# Patient Record
Sex: FEMALE | Race: OTHER | Employment: OTHER | URBAN - METROPOLITAN AREA
[De-identification: names, ages, dates, MRNs, and addresses within clinical notes are randomized per-mention and may not be internally consistent; named-entity substitution may affect disease eponyms.]

---

## 2018-04-23 ENCOUNTER — HOSPITAL ENCOUNTER (OUTPATIENT)
Dept: GENERAL RADIOLOGY | Age: 83
Discharge: HOME | End: 2018-04-25
Payer: MEDICARE

## 2018-04-23 ENCOUNTER — HOSPITAL ENCOUNTER (OUTPATIENT)
Dept: LAB | Age: 83
Discharge: HOME | End: 2018-04-23
Payer: MEDICARE

## 2018-04-23 DIAGNOSIS — R52 PAIN: ICD-10-CM

## 2018-04-23 LAB
ALBUMIN SERPL-MCNC: 4.5 G/DL (ref 3.9–4.9)
ALP BLD-CCNC: 84 U/L (ref 40–130)
ALT SERPL-CCNC: 14 U/L (ref 0–33)
AST SERPL-CCNC: 14 U/L (ref 0–35)
BASOPHILS ABSOLUTE: 0 K/UL (ref 0–0.2)
BASOPHILS RELATIVE PERCENT: 0.5 %
BILIRUB SERPL-MCNC: 0.4 MG/DL (ref 0–1.2)
BILIRUBIN DIRECT: 0.2 MG/DL (ref 0–0.3)
BILIRUBIN, INDIRECT: 0.2 MG/DL (ref 0–0.6)
CHOLESTEROL, TOTAL: 154 MG/DL (ref 0–199)
EOSINOPHILS ABSOLUTE: 0.1 K/UL (ref 0–0.7)
EOSINOPHILS RELATIVE PERCENT: 0.9 %
HCT VFR BLD CALC: 39.6 % (ref 37–47)
HDLC SERPL-MCNC: 51 MG/DL (ref 40–59)
HEMOGLOBIN: 13.3 G/DL (ref 12–16)
LDL CHOLESTEROL CALCULATED: 71 MG/DL (ref 0–129)
LYMPHOCYTES ABSOLUTE: 1.8 K/UL (ref 1–4.8)
LYMPHOCYTES RELATIVE PERCENT: 27.4 %
MCH RBC QN AUTO: 30.8 PG (ref 27–31.3)
MCHC RBC AUTO-ENTMCNC: 33.6 % (ref 33–37)
MCV RBC AUTO: 91.9 FL (ref 82–100)
MONOCYTES ABSOLUTE: 0.5 K/UL (ref 0.2–0.8)
MONOCYTES RELATIVE PERCENT: 7 %
NEUTROPHILS ABSOLUTE: 4.2 K/UL (ref 1.4–6.5)
NEUTROPHILS RELATIVE PERCENT: 64.2 %
PDW BLD-RTO: 13.7 % (ref 11.5–14.5)
PLATELET # BLD: 280 K/UL (ref 130–400)
RBC # BLD: 4.31 M/UL (ref 4.2–5.4)
SEDIMENTATION RATE, ERYTHROCYTE: 26 MM (ref 0–30)
TOTAL PROTEIN: 7.5 G/DL (ref 6.4–8.1)
TRIGL SERPL-MCNC: 161 MG/DL (ref 0–200)
WBC # BLD: 6.5 K/UL (ref 4.8–10.8)

## 2018-04-23 PROCEDURE — 80061 LIPID PANEL: CPT

## 2018-04-23 PROCEDURE — 85652 RBC SED RATE AUTOMATED: CPT

## 2018-04-23 PROCEDURE — 85025 COMPLETE CBC W/AUTO DIFF WBC: CPT

## 2018-04-23 PROCEDURE — 80076 HEPATIC FUNCTION PANEL: CPT

## 2018-04-23 PROCEDURE — 74018 RADEX ABDOMEN 1 VIEW: CPT

## 2018-04-25 ENCOUNTER — HOSPITAL ENCOUNTER (OUTPATIENT)
Dept: ULTRASOUND IMAGING | Age: 83
Discharge: HOME | End: 2018-04-27
Payer: MEDICARE

## 2018-04-25 DIAGNOSIS — R10.9 ABDOMINAL PAIN, UNSPECIFIED ABDOMINAL LOCATION: ICD-10-CM

## 2018-04-25 PROCEDURE — 76700 US EXAM ABDOM COMPLETE: CPT

## 2018-09-27 ENCOUNTER — HOSPITAL ENCOUNTER (OUTPATIENT)
Dept: LAB | Age: 83
Discharge: HOME | End: 2018-09-27
Payer: MEDICARE

## 2018-09-27 PROCEDURE — 84443 ASSAY THYROID STIM HORMONE: CPT

## 2018-09-27 PROCEDURE — 82306 VITAMIN D 25 HYDROXY: CPT

## 2018-09-27 PROCEDURE — 85025 COMPLETE CBC W/AUTO DIFF WBC: CPT

## 2018-09-27 PROCEDURE — 80053 COMPREHEN METABOLIC PANEL: CPT

## 2019-02-21 ENCOUNTER — HOSPITAL ENCOUNTER (EMERGENCY)
Age: 84
Discharge: HOME | End: 2019-02-22
Attending: EMERGENCY MEDICINE
Payer: MEDICARE

## 2019-02-21 VITALS
HEIGHT: 62 IN | WEIGHT: 162 LBS | OXYGEN SATURATION: 96 % | TEMPERATURE: 97.8 F | DIASTOLIC BLOOD PRESSURE: 64 MMHG | BODY MASS INDEX: 29.81 KG/M2 | SYSTOLIC BLOOD PRESSURE: 162 MMHG | HEART RATE: 72 BPM | RESPIRATION RATE: 18 BRPM

## 2019-02-21 DIAGNOSIS — I10 ESSENTIAL HYPERTENSION: Primary | ICD-10-CM

## 2019-02-21 PROCEDURE — 99283 EMERGENCY DEPT VISIT LOW MDM: CPT

## 2019-02-21 RX ORDER — MELOXICAM 7.5 MG/1
7.5 TABLET ORAL PRN
COMMUNITY
End: 2020-07-16 | Stop reason: ALTCHOICE

## 2019-02-21 RX ORDER — VALSARTAN 160 MG/1
320 TABLET ORAL DAILY
COMMUNITY
Start: 2002-09-23 | End: 2020-07-16 | Stop reason: DRUGHIGH

## 2019-02-21 RX ORDER — DIAZEPAM 2 MG/1
5 TABLET ORAL 2 TIMES DAILY
COMMUNITY
Start: 2001-05-14 | End: 2021-12-13

## 2019-02-21 RX ORDER — ATENOLOL 50 MG/1
50 TABLET ORAL DAILY
COMMUNITY
Start: 2001-05-14 | End: 2020-07-16 | Stop reason: DRUGHIGH

## 2019-02-21 ASSESSMENT — PAIN DESCRIPTION - PAIN TYPE: TYPE: ACUTE PAIN

## 2019-02-21 ASSESSMENT — PAIN DESCRIPTION - LOCATION: LOCATION: HEAD

## 2019-02-21 ASSESSMENT — PAIN SCALES - GENERAL
PAINLEVEL_OUTOF10: 0
PAINLEVEL_OUTOF10: 0

## 2019-02-21 ASSESSMENT — PAIN DESCRIPTION - ORIENTATION: ORIENTATION: RIGHT

## 2019-02-21 ASSESSMENT — PAIN DESCRIPTION - DESCRIPTORS: DESCRIPTORS: DULL

## 2019-02-21 ASSESSMENT — PAIN DESCRIPTION - FREQUENCY: FREQUENCY: INTERMITTENT

## 2019-02-22 RX ORDER — ACETAMINOPHEN 500 MG
1000 TABLET ORAL ONCE
Status: DISCONTINUED | OUTPATIENT
Start: 2019-02-22 | End: 2019-02-22 | Stop reason: HOSPADM

## 2019-03-07 ENCOUNTER — HOSPITAL ENCOUNTER (OUTPATIENT)
Dept: LAB | Age: 84
Discharge: HOME | End: 2019-03-07
Payer: MEDICARE

## 2019-03-08 ENCOUNTER — HOSPITAL ENCOUNTER (OUTPATIENT)
Dept: LAB | Age: 84
Discharge: HOME | End: 2019-03-08
Payer: MEDICARE

## 2019-03-08 LAB
ALBUMIN SERPL-MCNC: 4.6 G/DL (ref 3.5–4.6)
ALP BLD-CCNC: 76 U/L (ref 40–130)
ALT SERPL-CCNC: 26 U/L (ref 0–33)
ANION GAP SERPL CALCULATED.3IONS-SCNC: 14 MEQ/L (ref 9–15)
AST SERPL-CCNC: 22 U/L (ref 0–35)
BASOPHILS ABSOLUTE: 0 K/UL (ref 0–0.2)
BASOPHILS RELATIVE PERCENT: 0.6 %
BILIRUB SERPL-MCNC: 0.4 MG/DL (ref 0.2–0.7)
BUN BLDV-MCNC: 18 MG/DL (ref 8–23)
C-REACTIVE PROTEIN: 2 MG/L (ref 0–5)
CALCIUM SERPL-MCNC: 9.4 MG/DL (ref 8.5–9.9)
CHLORIDE BLD-SCNC: 100 MEQ/L (ref 95–107)
CHOLESTEROL, TOTAL: 156 MG/DL (ref 0–199)
CO2: 27 MEQ/L (ref 20–31)
CREAT SERPL-MCNC: 0.64 MG/DL (ref 0.5–0.9)
EOSINOPHILS ABSOLUTE: 0.1 K/UL (ref 0–0.7)
EOSINOPHILS RELATIVE PERCENT: 1 %
GFR AFRICAN AMERICAN: >60
GFR NON-AFRICAN AMERICAN: >60
GLOBULIN: 3.2 G/DL (ref 2.3–3.5)
GLUCOSE BLD-MCNC: 84 MG/DL (ref 70–99)
HCT VFR BLD CALC: 39.9 % (ref 37–47)
HDLC SERPL-MCNC: 51 MG/DL (ref 40–59)
HEMOGLOBIN: 13.2 G/DL (ref 12–16)
IRON SATURATION: 30 % (ref 11–46)
IRON: 94 UG/DL (ref 37–145)
LDL CHOLESTEROL CALCULATED: 75 MG/DL (ref 0–129)
LYMPHOCYTES ABSOLUTE: 1.8 K/UL (ref 1–4.8)
LYMPHOCYTES RELATIVE PERCENT: 25.7 %
MCH RBC QN AUTO: 29.6 PG (ref 27–31.3)
MCHC RBC AUTO-ENTMCNC: 33.1 % (ref 33–37)
MCV RBC AUTO: 89.6 FL (ref 82–100)
MONOCYTES ABSOLUTE: 0.7 K/UL (ref 0.2–0.8)
MONOCYTES RELATIVE PERCENT: 10.1 %
NEUTROPHILS ABSOLUTE: 4.4 K/UL (ref 1.4–6.5)
NEUTROPHILS RELATIVE PERCENT: 62.6 %
PDW BLD-RTO: 14 % (ref 11.5–14.5)
PLATELET # BLD: 285 K/UL (ref 130–400)
POTASSIUM SERPL-SCNC: 3.9 MEQ/L (ref 3.4–4.9)
RBC # BLD: 4.45 M/UL (ref 4.2–5.4)
SODIUM BLD-SCNC: 141 MEQ/L (ref 135–144)
TOTAL IRON BINDING CAPACITY: 315 UG/DL (ref 178–450)
TOTAL PROTEIN: 7.8 G/DL (ref 6.3–8)
TRIGL SERPL-MCNC: 149 MG/DL (ref 0–150)
TSH SERPL DL<=0.05 MIU/L-ACNC: 4.03 UIU/ML (ref 0.44–3.86)
VITAMIN D 25-HYDROXY: 37 NG/ML (ref 30–100)
WBC # BLD: 7 K/UL (ref 4.8–10.8)

## 2019-03-08 PROCEDURE — 86140 C-REACTIVE PROTEIN: CPT

## 2019-03-08 PROCEDURE — 82306 VITAMIN D 25 HYDROXY: CPT

## 2019-03-08 PROCEDURE — 80061 LIPID PANEL: CPT

## 2019-03-08 PROCEDURE — 84443 ASSAY THYROID STIM HORMONE: CPT

## 2019-03-08 PROCEDURE — 83550 IRON BINDING TEST: CPT

## 2019-03-08 PROCEDURE — 85025 COMPLETE CBC W/AUTO DIFF WBC: CPT

## 2019-03-08 PROCEDURE — 80053 COMPREHEN METABOLIC PANEL: CPT

## 2019-03-08 PROCEDURE — 83540 ASSAY OF IRON: CPT

## 2019-04-29 ENCOUNTER — HOSPITAL ENCOUNTER (EMERGENCY)
Age: 84
Discharge: HOME | End: 2019-04-29
Attending: EMERGENCY MEDICINE
Payer: MEDICARE

## 2019-04-29 ENCOUNTER — APPOINTMENT (OUTPATIENT)
Dept: CT IMAGING | Age: 84
End: 2019-04-29
Payer: MEDICARE

## 2019-04-29 VITALS
RESPIRATION RATE: 16 BRPM | DIASTOLIC BLOOD PRESSURE: 60 MMHG | SYSTOLIC BLOOD PRESSURE: 121 MMHG | TEMPERATURE: 98 F | OXYGEN SATURATION: 98 % | HEIGHT: 62 IN | WEIGHT: 161 LBS | HEART RATE: 70 BPM | BODY MASS INDEX: 29.63 KG/M2

## 2019-04-29 DIAGNOSIS — F41.9 ANXIETY DISORDER, UNSPECIFIED TYPE: ICD-10-CM

## 2019-04-29 DIAGNOSIS — M54.2 NECK PAIN: Primary | ICD-10-CM

## 2019-04-29 LAB
EKG ATRIAL RATE: 71 BPM
EKG P AXIS: 25 DEGREES
EKG P-R INTERVAL: 128 MS
EKG Q-T INTERVAL: 432 MS
EKG QRS DURATION: 144 MS
EKG QTC CALCULATION (BAZETT): 469 MS
EKG R AXIS: 55 DEGREES
EKG T AXIS: 30 DEGREES
EKG VENTRICULAR RATE: 71 BPM

## 2019-04-29 PROCEDURE — 99284 EMERGENCY DEPT VISIT MOD MDM: CPT

## 2019-04-29 PROCEDURE — 93005 ELECTROCARDIOGRAM TRACING: CPT

## 2019-04-29 PROCEDURE — 72125 CT NECK SPINE W/O DYE: CPT

## 2019-04-29 RX ORDER — CYCLOBENZAPRINE HCL 10 MG
10 TABLET ORAL 3 TIMES DAILY PRN
Qty: 30 TABLET | Refills: 0 | Status: SHIPPED | OUTPATIENT
Start: 2019-04-29 | End: 2019-05-09

## 2019-04-29 ASSESSMENT — PAIN SCALES - GENERAL
PAINLEVEL_OUTOF10: 2
PAINLEVEL_OUTOF10: 6

## 2019-04-29 ASSESSMENT — ENCOUNTER SYMPTOMS
SHORTNESS OF BREATH: 0
NAUSEA: 0
EYE PAIN: 0
ABDOMINAL PAIN: 0
CHEST TIGHTNESS: 0
VOMITING: 0
SORE THROAT: 0
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
## 2019-04-29 NOTE — PROGRESS NOTES
Patient given turkey wrap with gingerale. Patient resting comfortably at this time. Call light in reach.

## 2019-04-29 NOTE — ED NOTES
EKG completed by this ED Tech. Patient tolerated well. EKG resulted/transmitted. EKG was given to Dr. Amira Marinelli.       Jerome Hodgson  04/29/19 3076

## 2019-04-30 PROCEDURE — 93010 ELECTROCARDIOGRAM REPORT: CPT | Performed by: INTERNAL MEDICINE

## 2019-06-21 ENCOUNTER — HOSPITAL ENCOUNTER (OUTPATIENT)
Dept: ULTRASOUND IMAGING | Age: 84
Discharge: HOME | End: 2019-06-23
Payer: MEDICARE

## 2019-06-21 ENCOUNTER — HOSPITAL ENCOUNTER (OUTPATIENT)
Dept: GENERAL RADIOLOGY | Age: 84
Discharge: HOME | End: 2019-06-23
Payer: MEDICARE

## 2019-06-21 DIAGNOSIS — R10.9 ABDOMINAL PAIN, UNSPECIFIED ABDOMINAL LOCATION: ICD-10-CM

## 2019-06-21 PROCEDURE — 76705 ECHO EXAM OF ABDOMEN: CPT

## 2019-06-21 PROCEDURE — 74018 RADEX ABDOMEN 1 VIEW: CPT

## 2019-06-23 ENCOUNTER — HOSPITAL ENCOUNTER (EMERGENCY)
Age: 84
Discharge: HOME | End: 2019-06-23
Attending: FAMILY MEDICINE
Payer: MEDICARE

## 2019-06-23 ENCOUNTER — APPOINTMENT (OUTPATIENT)
Dept: GENERAL RADIOLOGY | Age: 84
End: 2019-06-23
Payer: MEDICARE

## 2019-06-23 VITALS
DIASTOLIC BLOOD PRESSURE: 98 MMHG | BODY MASS INDEX: 30.91 KG/M2 | TEMPERATURE: 98.5 F | HEART RATE: 90 BPM | WEIGHT: 168 LBS | HEIGHT: 62 IN | OXYGEN SATURATION: 95 % | SYSTOLIC BLOOD PRESSURE: 152 MMHG | RESPIRATION RATE: 16 BRPM

## 2019-06-23 DIAGNOSIS — R00.2 PALPITATIONS: Primary | ICD-10-CM

## 2019-06-23 DIAGNOSIS — F41.9 ANXIETY: ICD-10-CM

## 2019-06-23 LAB
ALBUMIN SERPL-MCNC: 4.1 G/DL (ref 3.5–4.6)
ALP BLD-CCNC: 82 U/L (ref 40–130)
ALT SERPL-CCNC: 34 U/L (ref 0–33)
ANION GAP SERPL CALCULATED.3IONS-SCNC: 12 MEQ/L (ref 9–15)
AST SERPL-CCNC: 37 U/L (ref 0–35)
BACTERIA: ABNORMAL /HPF
BASOPHILS ABSOLUTE: 0.1 K/UL (ref 0–0.2)
BASOPHILS RELATIVE PERCENT: 0.9 %
BILIRUB SERPL-MCNC: 0.4 MG/DL (ref 0.2–0.7)
BILIRUBIN URINE: NEGATIVE
BLOOD, URINE: ABNORMAL
BUN BLDV-MCNC: 16 MG/DL (ref 8–23)
CALCIUM SERPL-MCNC: 9.1 MG/DL (ref 8.5–9.9)
CHLORIDE BLD-SCNC: 101 MEQ/L (ref 95–107)
CLARITY: CLEAR
CO2: 27 MEQ/L (ref 20–31)
COLOR: YELLOW
CREAT SERPL-MCNC: 0.78 MG/DL (ref 0.5–0.9)
EKG ATRIAL RATE: 81 BPM
EKG P AXIS: 33 DEGREES
EKG P-R INTERVAL: 140 MS
EKG Q-T INTERVAL: 420 MS
EKG QRS DURATION: 130 MS
EKG QTC CALCULATION (BAZETT): 487 MS
EKG R AXIS: 62 DEGREES
EKG T AXIS: 32 DEGREES
EKG VENTRICULAR RATE: 81 BPM
EOSINOPHILS ABSOLUTE: 0.1 K/UL (ref 0–0.7)
EOSINOPHILS RELATIVE PERCENT: 0.9 %
EPITHELIAL CELLS, UA: ABNORMAL /HPF (ref 0–5)
GFR AFRICAN AMERICAN: >60
GFR NON-AFRICAN AMERICAN: >60
GLOBULIN: 3.1 G/DL (ref 2.3–3.5)
GLUCOSE BLD-MCNC: 101 MG/DL (ref 70–99)
GLUCOSE URINE: NEGATIVE MG/DL
HCT VFR BLD CALC: 41.1 % (ref 37–47)
HEMOGLOBIN: 14.2 G/DL (ref 12–16)
HYALINE CASTS: ABNORMAL /HPF (ref 0–5)
KETONES, URINE: NEGATIVE MG/DL
LEUKOCYTE ESTERASE, URINE: ABNORMAL
LIPASE: 22 U/L (ref 12–95)
LYMPHOCYTES ABSOLUTE: 2.1 K/UL (ref 1–4.8)
LYMPHOCYTES RELATIVE PERCENT: 23.1 %
MCH RBC QN AUTO: 30.4 PG (ref 27–31.3)
MCHC RBC AUTO-ENTMCNC: 34.5 % (ref 33–37)
MCV RBC AUTO: 88.1 FL (ref 82–100)
MONOCYTES ABSOLUTE: 0.8 K/UL (ref 0.2–0.8)
MONOCYTES RELATIVE PERCENT: 9.5 %
NEUTROPHILS ABSOLUTE: 5.8 K/UL (ref 1.4–6.5)
NEUTROPHILS RELATIVE PERCENT: 65.6 %
NITRITE, URINE: NEGATIVE
PDW BLD-RTO: 13.6 % (ref 11.5–14.5)
PH UA: 7 (ref 5–9)
PLATELET # BLD: 260 K/UL (ref 130–400)
POTASSIUM SERPL-SCNC: 4.2 MEQ/L (ref 3.4–4.9)
PRO-BNP: 195 PG/ML
PROTEIN UA: NEGATIVE MG/DL
RBC # BLD: 4.66 M/UL (ref 4.2–5.4)
RBC UA: ABNORMAL /HPF (ref 0–2)
REASON FOR REJECTION: NORMAL
REJECTED TEST: NORMAL
SODIUM BLD-SCNC: 140 MEQ/L (ref 135–144)
SPECIFIC GRAVITY UA: 1.01 (ref 1–1.03)
TOTAL CK: 131 U/L (ref 0–170)
TOTAL PROTEIN: 7.2 G/DL (ref 6.3–8)
TROPONIN: <0.01 NG/ML (ref 0–0.01)
TSH SERPL DL<=0.05 MIU/L-ACNC: 3.16 UIU/ML (ref 0.44–3.86)
URINE REFLEX TO CULTURE: YES
UROBILINOGEN, URINE: 0.2 E.U./DL
WBC # BLD: 8.9 K/UL (ref 4.8–10.8)
WBC UA: ABNORMAL /HPF (ref 0–5)

## 2019-06-23 PROCEDURE — 36415 COLL VENOUS BLD VENIPUNCTURE: CPT

## 2019-06-23 PROCEDURE — 93005 ELECTROCARDIOGRAM TRACING: CPT | Performed by: FAMILY MEDICINE

## 2019-06-23 PROCEDURE — 83690 ASSAY OF LIPASE: CPT

## 2019-06-23 PROCEDURE — 82550 ASSAY OF CK (CPK): CPT

## 2019-06-23 PROCEDURE — 87086 URINE CULTURE/COLONY COUNT: CPT

## 2019-06-23 PROCEDURE — 83880 ASSAY OF NATRIURETIC PEPTIDE: CPT

## 2019-06-23 PROCEDURE — 85025 COMPLETE CBC W/AUTO DIFF WBC: CPT

## 2019-06-23 PROCEDURE — 71045 X-RAY EXAM CHEST 1 VIEW: CPT

## 2019-06-23 PROCEDURE — 81001 URINALYSIS AUTO W/SCOPE: CPT

## 2019-06-23 PROCEDURE — 84484 ASSAY OF TROPONIN QUANT: CPT

## 2019-06-23 PROCEDURE — 84443 ASSAY THYROID STIM HORMONE: CPT

## 2019-06-23 PROCEDURE — 99285 EMERGENCY DEPT VISIT HI MDM: CPT

## 2019-06-23 PROCEDURE — 80053 COMPREHEN METABOLIC PANEL: CPT

## 2019-06-23 ASSESSMENT — ENCOUNTER SYMPTOMS
SHORTNESS OF BREATH: 0
COUGH: 0
ORTHOPNEA: 0
VOMITING: 0
BACK PAIN: 1
NAUSEA: 0
HEMOPTYSIS: 0

## 2019-06-23 ASSESSMENT — PAIN SCALES - GENERAL: PAINLEVEL_OUTOF10: 7

## 2019-06-23 NOTE — ED NOTES
Urine sample collected and sent to lab via tube system at this time.       John Shoemaker  62/90/46 0263

## 2019-06-23 NOTE — ED NOTES
Discharge instructions given to patient. Patient verbalizes understanding. Patient denies questions of follow up.       Roland Hernandez RN  06/23/19 1836

## 2019-06-23 NOTE — ED NOTES
IV started. Lab work obtained and sent to lab. Patient aware of the need for urine.       Jacob Carrasco RN  06/23/19 6961

## 2019-06-23 NOTE — ED PROVIDER NOTES
PND and near-syncope. Gastrointestinal: Negative for nausea and vomiting. Musculoskeletal: Positive for back pain. Neurological: Negative for dizziness, weakness and numbness. Except as noted above the remainder of the review of systems was reviewed and negative. PAST MEDICAL HISTORY     Past Medical History:   Diagnosis Date    Anxiety     Cirrhosis ([address]     HTN (hypertension)          SURGICALHISTORY     History reviewed. No pertinent surgical history. CURRENT MEDICATIONS       Discharge Medication List as of 6/23/2019  6:08 PM      CONTINUE these medications which have NOT CHANGED    Details   atenolol (TENORMIN) 50 MG tablet Take 50 mg by mouth daily Historical Med      valsartan (DIOVAN) 160 MG tablet Take 320 mg by mouth daily Historical Med      aspirin (ECOTRIN) 325 MG EC tablet Take 325 mg by mouth dailyHistorical Med      diazepam (VALIUM) 2 MG tablet Take 2 mg by mouth 2 times daily. Kailee Palmer Historical Med      meloxicam (MOBIC) 7.5 MG tablet Take 7.5 mg by mouth as needed Historical Med             ALLERGIES     Pcn [penicillins]    FAMILY HISTORY     History reviewed. No pertinent family history.        SOCIAL HISTORY       Social History     Socioeconomic History    Marital status:      Spouse name: None    Number of children: None    Years of education: None    Highest education level: None   Occupational History    None   Social Needs    Financial resource strain: None    Food insecurity:     Worry: None     Inability: None    Transportation needs:     Medical: None     Non-medical: None   Tobacco Use    Smoking status: Never Smoker    Smokeless tobacco: Never Used   Substance and Sexual Activity    Alcohol use: No    Drug use: No    Sexual activity: Never   Lifestyle    Physical activity:     Days per week: None     Minutes per session: None    Stress: None   Relationships    Social connections:     Talks on phone: None     Gets together: None     Attends WITHOUT REFLEX   CK    Narrative:     CALL  Herrera  [facility] tel. [phone],  potassium critical results called to and read back by raul, 06/23/2019  16:01, by ADELA   SPECIMEN REJECTION       All other labs were within normal range or not returned as of this dictation. EMERGENCY DEPARTMENT COURSE and DIFFERENTIAL DIAGNOSIS/MDM:   Vitals:    Vitals:    06/23/19 1442 06/23/19 1617 06/23/19 1654 06/23/19 1755   BP: (!) 142/73 (!) 161/58 (!) 152/63 (!) 152/98   Pulse: 91 78 87 90   Resp: 18 18 16 16   Temp: 98.5 °F (36.9 °C)      TempSrc: Oral      SpO2:  98% 96% 95%   Weight: 168 lb (76.2 kg)      Height: 5' 1.5\" (1.562 m)                 MDM  Number of Diagnoses or Management Options  Anxiety:   Palpitations:   Diagnosis management comments: Rapid heart rate and palpitation but EKG and normal in monitor strip stayed in the AT cardiac work-up all negative patient reassured felt better was discharged home to follow-up with primary       Amount and/or Complexity of Data Reviewed  Clinical lab tests: ordered and reviewed  Tests in the radiology section of CPT®: ordered and reviewed       CONSULTS:  None    PROCEDURES:  Unless otherwise noted below, none     Procedures    FINAL IMPRESSION      1. Palpitations    2.  Anxiety          DISPOSITION/PLAN   DISPOSITION Decision To Discharge 06/23/2019 06:07:33 PM      PATIENT REFERRED TO:  Hema Beaver DO  [address] [place] [address]    In 2 days        DISCHARGE MEDICATIONS:  Discharge Medication List as of 6/23/2019  6:08 PM             (Please note thatportions of this note were completed with a voice recognition program.  Efforts were made to edit the dictations but occasionally words are mis-transcribed.)    Jose Grullon MD (electronically signed)  Attending Emergency Physician          Ada Espinal MD  06/23/19 Lawrence Steve MD  06/23/19 [phone]

## 2019-06-24 ASSESSMENT — ENCOUNTER SYMPTOMS
CHEST TIGHTNESS: 0
VOMITING: 0
NAUSEA: 0
ABDOMINAL PAIN: 0
EYE PAIN: 0
SHORTNESS OF BREATH: 0
SORE THROAT: 0

## 2019-06-25 LAB — URINE CULTURE, ROUTINE: NORMAL

## 2019-06-25 PROCEDURE — 93010 ELECTROCARDIOGRAM REPORT: CPT | Performed by: INTERNAL MEDICINE

## 2019-07-30 LAB
LV EF: 89 %
LVEF MODALITY: NORMAL

## 2019-11-11 ENCOUNTER — OFFICE VISIT (OUTPATIENT)
Dept: GASTROENTEROLOGY | Age: 84
End: 2019-11-11
Payer: MEDICARE

## 2019-11-11 VITALS
TEMPERATURE: 98 F | OXYGEN SATURATION: 97 % | HEART RATE: 100 BPM | HEIGHT: 61 IN | SYSTOLIC BLOOD PRESSURE: 138 MMHG | WEIGHT: 168 LBS | DIASTOLIC BLOOD PRESSURE: 68 MMHG | BODY MASS INDEX: 31.72 KG/M2

## 2019-11-11 DIAGNOSIS — K76.0 FATTY LIVER: ICD-10-CM

## 2019-11-11 DIAGNOSIS — R10.11 CHRONIC RUQ PAIN: ICD-10-CM

## 2019-11-11 DIAGNOSIS — R19.8 RUQ FULLNESS: ICD-10-CM

## 2019-11-11 DIAGNOSIS — G89.29 CHRONIC RUQ PAIN: ICD-10-CM

## 2019-11-11 DIAGNOSIS — R19.7 DIARRHEA, UNSPECIFIED TYPE: Primary | ICD-10-CM

## 2019-11-11 PROCEDURE — 4040F PNEUMOC VAC/ADMIN/RCVD: CPT | Performed by: INTERNAL MEDICINE

## 2019-11-11 PROCEDURE — 1090F PRES/ABSN URINE INCON ASSESS: CPT | Performed by: INTERNAL MEDICINE

## 2019-11-11 PROCEDURE — G8400 PT W/DXA NO RESULTS DOC: HCPCS | Performed by: INTERNAL MEDICINE

## 2019-11-11 PROCEDURE — 1123F ACP DISCUSS/DSCN MKR DOCD: CPT | Performed by: INTERNAL MEDICINE

## 2019-11-11 PROCEDURE — 1036F TOBACCO NON-USER: CPT | Performed by: INTERNAL MEDICINE

## 2019-11-11 PROCEDURE — G8484 FLU IMMUNIZE NO ADMIN: HCPCS | Performed by: INTERNAL MEDICINE

## 2019-11-11 PROCEDURE — G8427 DOCREV CUR MEDS BY ELIG CLIN: HCPCS | Performed by: INTERNAL MEDICINE

## 2019-11-11 PROCEDURE — G8417 CALC BMI ABV UP PARAM F/U: HCPCS | Performed by: INTERNAL MEDICINE

## 2019-11-11 PROCEDURE — 99204 OFFICE O/P NEW MOD 45 MIN: CPT | Performed by: INTERNAL MEDICINE

## 2019-11-11 RX ORDER — ATENOLOL 25 MG/1
25 TABLET ORAL DAILY
Refills: 0 | Status: ON HOLD | COMMUNITY
Start: 2019-09-28 | End: 2021-06-12 | Stop reason: HOSPADM

## 2019-11-11 RX ORDER — AMLODIPINE BESYLATE 5 MG/1
5 TABLET ORAL DAILY
Refills: 0 | COMMUNITY
Start: 2019-09-28 | End: 2020-07-16 | Stop reason: DRUGHIGH

## 2019-11-11 RX ORDER — ACTIVATED CHARCOAL 260 MG
CAPSULE ORAL
Qty: 120 CAPSULE | Refills: 3 | Status: SHIPPED | OUTPATIENT
Start: 2019-11-11 | End: 2020-07-16 | Stop reason: ALTCHOICE

## 2019-11-11 RX ORDER — CHOLESTYRAMINE 4 G/9G
1 POWDER, FOR SUSPENSION ORAL 2 TIMES DAILY
Qty: 90 PACKET | Refills: 3 | Status: SHIPPED | OUTPATIENT
Start: 2019-11-11 | End: 2020-07-16 | Stop reason: ALTCHOICE

## 2019-11-27 ENCOUNTER — HOSPITAL ENCOUNTER (OUTPATIENT)
Dept: ULTRASOUND IMAGING | Age: 84
Discharge: HOME | End: 2019-11-29
Payer: MEDICARE

## 2019-11-27 ENCOUNTER — OFFICE VISIT (OUTPATIENT)
Dept: OBGYN CLINIC | Age: 84
End: 2019-11-27
Payer: MEDICARE

## 2019-11-27 VITALS
WEIGHT: 165.4 LBS | SYSTOLIC BLOOD PRESSURE: 110 MMHG | DIASTOLIC BLOOD PRESSURE: 70 MMHG | HEIGHT: 61 IN | BODY MASS INDEX: 31.23 KG/M2

## 2019-11-27 DIAGNOSIS — R39.15 URINARY URGENCY: ICD-10-CM

## 2019-11-27 DIAGNOSIS — N89.8 VAGINAL IRRITATION: Primary | ICD-10-CM

## 2019-11-27 DIAGNOSIS — R33.9 URINARY RETENTION: ICD-10-CM

## 2019-11-27 LAB
AMORPHOUS: NORMAL
BACTERIA: NEGATIVE /HPF
BILIRUBIN URINE: NEGATIVE
BLOOD, URINE: NEGATIVE
CLARITY: ABNORMAL
COLOR: YELLOW
EPITHELIAL CELLS, UA: NORMAL /HPF (ref 0–5)
GLUCOSE URINE: NEGATIVE MG/DL
HYALINE CASTS: NORMAL /HPF (ref 0–5)
KETONES, URINE: NEGATIVE MG/DL
LEUKOCYTE ESTERASE, URINE: ABNORMAL
NITRITE, URINE: NEGATIVE
PH UA: 5.5 (ref 5–9)
PROTEIN UA: NEGATIVE MG/DL
RBC UA: NORMAL /HPF (ref 0–2)
SPECIFIC GRAVITY UA: 1.02 (ref 1–1.03)
UROBILINOGEN, URINE: 0.2 E.U./DL
WBC UA: NORMAL /HPF (ref 0–5)

## 2019-11-27 PROCEDURE — 1036F TOBACCO NON-USER: CPT | Performed by: OBSTETRICS & GYNECOLOGY

## 2019-11-27 PROCEDURE — G8427 DOCREV CUR MEDS BY ELIG CLIN: HCPCS | Performed by: OBSTETRICS & GYNECOLOGY

## 2019-11-27 PROCEDURE — 99204 OFFICE O/P NEW MOD 45 MIN: CPT | Performed by: OBSTETRICS & GYNECOLOGY

## 2019-11-27 PROCEDURE — 1123F ACP DISCUSS/DSCN MKR DOCD: CPT | Performed by: OBSTETRICS & GYNECOLOGY

## 2019-11-27 PROCEDURE — G8417 CALC BMI ABV UP PARAM F/U: HCPCS | Performed by: OBSTETRICS & GYNECOLOGY

## 2019-11-27 PROCEDURE — 76830 TRANSVAGINAL US NON-OB: CPT

## 2019-11-27 PROCEDURE — G8484 FLU IMMUNIZE NO ADMIN: HCPCS | Performed by: OBSTETRICS & GYNECOLOGY

## 2019-11-27 PROCEDURE — G8400 PT W/DXA NO RESULTS DOC: HCPCS | Performed by: OBSTETRICS & GYNECOLOGY

## 2019-11-27 PROCEDURE — 4040F PNEUMOC VAC/ADMIN/RCVD: CPT | Performed by: OBSTETRICS & GYNECOLOGY

## 2019-11-27 PROCEDURE — 76856 US EXAM PELVIC COMPLETE: CPT

## 2019-11-27 PROCEDURE — 1090F PRES/ABSN URINE INCON ASSESS: CPT | Performed by: OBSTETRICS & GYNECOLOGY

## 2019-11-29 LAB — URINE CULTURE, ROUTINE: NORMAL

## 2019-12-01 ASSESSMENT — ENCOUNTER SYMPTOMS
ABDOMINAL PAIN: 0
APNEA: 0
SHORTNESS OF BREATH: 0

## 2019-12-04 ENCOUNTER — TELEPHONE (OUTPATIENT)
Dept: OBGYN CLINIC | Age: 84
End: 2019-12-04

## 2020-05-06 ENCOUNTER — HOSPITAL ENCOUNTER (EMERGENCY)
Age: 85
Discharge: HOME | End: 2020-05-06
Payer: MEDICARE

## 2020-05-06 ENCOUNTER — APPOINTMENT (OUTPATIENT)
Dept: CT IMAGING | Age: 85
End: 2020-05-06
Payer: MEDICARE

## 2020-05-06 VITALS
RESPIRATION RATE: 16 BRPM | SYSTOLIC BLOOD PRESSURE: 123 MMHG | TEMPERATURE: 97.9 F | HEIGHT: 61 IN | BODY MASS INDEX: 30.96 KG/M2 | OXYGEN SATURATION: 98 % | WEIGHT: 164 LBS | DIASTOLIC BLOOD PRESSURE: 44 MMHG | HEART RATE: 88 BPM

## 2020-05-06 LAB
ALBUMIN SERPL-MCNC: 4.3 G/DL (ref 3.5–4.6)
ALP BLD-CCNC: 74 U/L (ref 40–130)
ALT SERPL-CCNC: 21 U/L (ref 0–33)
ANION GAP SERPL CALCULATED.3IONS-SCNC: 11 MEQ/L (ref 9–15)
AST SERPL-CCNC: 24 U/L (ref 0–35)
BACTERIA: NEGATIVE /HPF
BASOPHILS ABSOLUTE: 0.1 K/UL (ref 0–0.2)
BASOPHILS RELATIVE PERCENT: 0.7 %
BILIRUB SERPL-MCNC: 0.3 MG/DL (ref 0.2–0.7)
BILIRUBIN URINE: NEGATIVE
BLOOD, URINE: ABNORMAL
BUN BLDV-MCNC: 11 MG/DL (ref 8–23)
CALCIUM SERPL-MCNC: 9.6 MG/DL (ref 8.5–9.9)
CHLORIDE BLD-SCNC: 99 MEQ/L (ref 95–107)
CLARITY: CLEAR
CO2: 27 MEQ/L (ref 20–31)
COLOR: YELLOW
CREAT SERPL-MCNC: 0.58 MG/DL (ref 0.5–0.9)
EOSINOPHILS ABSOLUTE: 0.1 K/UL (ref 0–0.7)
EOSINOPHILS RELATIVE PERCENT: 1.1 %
EPITHELIAL CELLS, UA: ABNORMAL /HPF (ref 0–5)
GFR AFRICAN AMERICAN: >60
GFR NON-AFRICAN AMERICAN: >60
GLOBULIN: 3.4 G/DL (ref 2.3–3.5)
GLUCOSE BLD-MCNC: 115 MG/DL (ref 70–99)
GLUCOSE URINE: NEGATIVE MG/DL
HCT VFR BLD CALC: 42.5 % (ref 37–47)
HEMOGLOBIN: 14.3 G/DL (ref 12–16)
HYALINE CASTS: ABNORMAL /HPF (ref 0–5)
KETONES, URINE: NEGATIVE MG/DL
LEUKOCYTE ESTERASE, URINE: ABNORMAL
LYMPHOCYTES ABSOLUTE: 2 K/UL (ref 1–4.8)
LYMPHOCYTES RELATIVE PERCENT: 22.1 %
MCH RBC QN AUTO: 29.4 PG (ref 27–31.3)
MCHC RBC AUTO-ENTMCNC: 33.5 % (ref 33–37)
MCV RBC AUTO: 87.8 FL (ref 82–100)
MONOCYTES ABSOLUTE: 0.9 K/UL (ref 0.2–0.8)
MONOCYTES RELATIVE PERCENT: 9.4 %
NEUTROPHILS ABSOLUTE: 6.1 K/UL (ref 1.4–6.5)
NEUTROPHILS RELATIVE PERCENT: 66.7 %
NITRITE, URINE: NEGATIVE
PDW BLD-RTO: 13.2 % (ref 11.5–14.5)
PH UA: 6.5 (ref 5–9)
PLATELET # BLD: 331 K/UL (ref 130–400)
POTASSIUM SERPL-SCNC: 4.3 MEQ/L (ref 3.4–4.9)
PROTEIN UA: NEGATIVE MG/DL
RBC # BLD: 4.84 M/UL (ref 4.2–5.4)
RBC UA: ABNORMAL /HPF (ref 0–5)
SODIUM BLD-SCNC: 137 MEQ/L (ref 135–144)
SPECIFIC GRAVITY UA: 1.01 (ref 1–1.03)
TOTAL PROTEIN: 7.7 G/DL (ref 6.3–8)
URINE REFLEX TO CULTURE: ABNORMAL
UROBILINOGEN, URINE: 0.2 E.U./DL
WBC # BLD: 9.1 K/UL (ref 4.8–10.8)
WBC UA: ABNORMAL /HPF (ref 0–5)

## 2020-05-06 PROCEDURE — 85025 COMPLETE CBC W/AUTO DIFF WBC: CPT

## 2020-05-06 PROCEDURE — 80053 COMPREHEN METABOLIC PANEL: CPT

## 2020-05-06 PROCEDURE — 96361 HYDRATE IV INFUSION ADD-ON: CPT

## 2020-05-06 PROCEDURE — 81001 URINALYSIS AUTO W/SCOPE: CPT

## 2020-05-06 PROCEDURE — 36415 COLL VENOUS BLD VENIPUNCTURE: CPT

## 2020-05-06 PROCEDURE — 70450 CT HEAD/BRAIN W/O DYE: CPT

## 2020-05-06 PROCEDURE — 96375 TX/PRO/DX INJ NEW DRUG ADDON: CPT

## 2020-05-06 PROCEDURE — 96374 THER/PROPH/DIAG INJ IV PUSH: CPT

## 2020-05-06 PROCEDURE — 2580000003 HC RX 258: Performed by: STUDENT IN AN ORGANIZED HEALTH CARE EDUCATION/TRAINING PROGRAM

## 2020-05-06 PROCEDURE — 99284 EMERGENCY DEPT VISIT MOD MDM: CPT

## 2020-05-06 PROCEDURE — 6360000002 HC RX W HCPCS: Performed by: STUDENT IN AN ORGANIZED HEALTH CARE EDUCATION/TRAINING PROGRAM

## 2020-05-06 RX ORDER — METOCLOPRAMIDE 10 MG/1
10 TABLET ORAL 4 TIMES DAILY PRN
Qty: 20 TABLET | Refills: 0 | Status: ON HOLD | OUTPATIENT
Start: 2020-05-06 | End: 2021-06-10

## 2020-05-06 RX ORDER — ONDANSETRON 2 MG/ML
4 INJECTION INTRAMUSCULAR; INTRAVENOUS ONCE
Status: COMPLETED | OUTPATIENT
Start: 2020-05-06 | End: 2020-05-06

## 2020-05-06 RX ORDER — MORPHINE SULFATE 2 MG/ML
2 INJECTION, SOLUTION INTRAMUSCULAR; INTRAVENOUS ONCE
Status: COMPLETED | OUTPATIENT
Start: 2020-05-06 | End: 2020-05-06

## 2020-05-06 RX ORDER — 0.9 % SODIUM CHLORIDE 0.9 %
1000 INTRAVENOUS SOLUTION INTRAVENOUS ONCE
Status: COMPLETED | OUTPATIENT
Start: 2020-05-06 | End: 2020-05-06

## 2020-05-06 RX ORDER — KETOROLAC TROMETHAMINE 15 MG/ML
15 INJECTION, SOLUTION INTRAMUSCULAR; INTRAVENOUS ONCE
Status: COMPLETED | OUTPATIENT
Start: 2020-05-06 | End: 2020-05-06

## 2020-05-06 RX ORDER — DIPHENHYDRAMINE HYDROCHLORIDE 50 MG/ML
25 INJECTION INTRAMUSCULAR; INTRAVENOUS ONCE
Status: COMPLETED | OUTPATIENT
Start: 2020-05-06 | End: 2020-05-06

## 2020-05-06 RX ORDER — METOCLOPRAMIDE 10 MG/1
10 TABLET ORAL 4 TIMES DAILY PRN
Qty: 20 TABLET | Refills: 0 | Status: SHIPPED | OUTPATIENT
Start: 2020-05-06 | End: 2020-05-06 | Stop reason: SDUPTHER

## 2020-05-06 RX ORDER — METOCLOPRAMIDE HYDROCHLORIDE 5 MG/ML
10 INJECTION INTRAMUSCULAR; INTRAVENOUS ONCE
Status: COMPLETED | OUTPATIENT
Start: 2020-05-06 | End: 2020-05-06

## 2020-05-06 RX ADMIN — SODIUM CHLORIDE 1000 ML: 9 INJECTION, SOLUTION INTRAVENOUS at 04:13

## 2020-05-06 RX ADMIN — DIPHENHYDRAMINE HYDROCHLORIDE 25 MG: 50 INJECTION, SOLUTION INTRAMUSCULAR; INTRAVENOUS at 04:13

## 2020-05-06 RX ADMIN — KETOROLAC TROMETHAMINE 15 MG: 15 INJECTION, SOLUTION INTRAMUSCULAR; INTRAVENOUS at 04:14

## 2020-05-06 RX ADMIN — METOCLOPRAMIDE 10 MG: 5 INJECTION, SOLUTION INTRAMUSCULAR; INTRAVENOUS at 04:13

## 2020-05-06 RX ADMIN — MORPHINE SULFATE 2 MG: 2 INJECTION, SOLUTION INTRAMUSCULAR; INTRAVENOUS at 05:41

## 2020-05-06 RX ADMIN — ONDANSETRON 4 MG: 2 INJECTION INTRAMUSCULAR; INTRAVENOUS at 05:41

## 2020-05-06 ASSESSMENT — ENCOUNTER SYMPTOMS
PHOTOPHOBIA: 0
COUGH: 0
ABDOMINAL PAIN: 0
DIARRHEA: 0
CONSTIPATION: 0
WHEEZING: 0
VOMITING: 0
SHORTNESS OF BREATH: 0
NAUSEA: 1

## 2020-05-06 ASSESSMENT — PAIN DESCRIPTION - PAIN TYPE
TYPE: CHRONIC PAIN
TYPE: CHRONIC PAIN

## 2020-05-06 ASSESSMENT — PAIN DESCRIPTION - DESCRIPTORS
DESCRIPTORS: ACHING
DESCRIPTORS: ACHING

## 2020-05-06 ASSESSMENT — PAIN SCALES - GENERAL
PAINLEVEL_OUTOF10: 9
PAINLEVEL_OUTOF10: 5
PAINLEVEL_OUTOF10: 10
PAINLEVEL_OUTOF10: 8

## 2020-05-06 ASSESSMENT — PAIN DESCRIPTION - LOCATION
LOCATION: HEAD
LOCATION: HEAD;NECK

## 2020-05-06 ASSESSMENT — PAIN DESCRIPTION - FREQUENCY
FREQUENCY: CONTINUOUS
FREQUENCY: CONTINUOUS

## 2020-05-06 NOTE — ED NOTES
Patient given discharge instructions, medications, and follow-up care. Patient verbalized understanding. Patient does not have any questions or concerns at this time. Patient is a&o x4. Respirations are even and unlabored. Skin is warm, pink, and dry. Patient is ambulatory.      Nataliia Aldrich RN  05/06/20 7166

## 2020-05-06 NOTE — ED NOTES
Patient ambulated to restroom. Tolerated activity well. Urine sample collected, labeled, and sent to lab via tube system.       Lexie Clement RN  05/06/20 6578

## 2020-05-06 NOTE — ED PROVIDER NOTES
was reviewed and negative. PAST MEDICAL HISTORY     Past Medical History:   Diagnosis Date    Anxiety     Cirrhosis ([address]     HTN (hypertension)          SURGICAL HISTORY     History reviewed. No pertinent surgical history. CURRENT MEDICATIONS       Discharge Medication List as of 5/6/2020  5:59 AM      CONTINUE these medications which have NOT CHANGED    Details   Probiotic Product (PROBIOTIC DAILY PO) Take 1 capsule by mouthHistorical Med      amLODIPine (NORVASC) 5 MG tablet Take 5 mg by mouth daily, R-0Historical Med      !! atenolol (TENORMIN) 25 MG tablet Take 25 mg by mouth daily, R-0Historical Med      cholestyramine (QUESTRAN) 4 g packet Take 1 packet by mouth 2 times daily, Disp-90 packet, R-3Normal      activated vegetable charcoal (CHARCOCAPS) 260 MG capsule Take 1 capsule 4 times a day as needed, Disp-120 capsule, R-3Normal      !! atenolol (TENORMIN) 50 MG tablet Take 50 mg by mouth daily Historical Med      valsartan (DIOVAN) 160 MG tablet Take 320 mg by mouth daily Historical Med      aspirin (ECOTRIN) 325 MG EC tablet Take 325 mg by mouth dailyHistorical Med      diazepam (VALIUM) 2 MG tablet Take 2 mg by mouth 2 times daily. Ettie [place] Historical Med      meloxicam (MOBIC) 7.5 MG tablet Take 7.5 mg by mouth as needed Historical Med       !! - Potential duplicate medications found. Please discuss with provider. ALLERGIES     Pcn [penicillins]    FAMILY HISTORY     History reviewed. No pertinent family history.        SOCIAL HISTORY       Social History     Socioeconomic History    Marital status:      Spouse name: None    Number of children: None    Years of education: None    Highest education level: None   Occupational History    None   Social Needs    Financial resource strain: None    Food insecurity     Worry: None     Inability: None    Transportation needs     Medical: None     Non-medical: None   Tobacco Use    Smoking status: Never Smoker    Smokeless tobacco: Never Used   Substance and Sexual Activity    Alcohol use: No    Drug use: No    Sexual activity: Never   Lifestyle    Physical activity     Days per week: None     Minutes per session: None    Stress: None   Relationships    Social connections     Talks on phone: None     Gets together: None     Attends Mu-ism service: None     Active member of club or organization: None     Attends meetings of clubs or organizations: None     Relationship status: None    Intimate partner violence     Fear of current or ex partner: None     Emotionally abused: None     Physically abused: None     Forced sexual activity: None   Other Topics Concern    None   Social History Narrative    None       SCREENINGS    [place] Coma Scale  Eye Opening: Spontaneous  Best Verbal Response: Oriented  Best Motor Response: Obeys commands  [place] Coma Scale Score: 15           PHYSICAL EXAM    (up to 7 for level 4, 8 or more for level 5)     ED Triage Vitals [05/06/20 0334]   BP Temp Temp Source Pulse Resp SpO2 Height Weight   (!) 191/82 97.9 °F (36.6 °C) Oral 94 18 96 % 5' 1\" (1.549 m) 164 lb (74.4 kg)       Physical Exam  Constitutional:       General: She is not in acute distress. Appearance: She is well-developed. She is not ill-appearing, toxic-appearing or diaphoretic. HENT:      Head: Normocephalic and atraumatic. No raccoon eyes or Chow's sign. Nose: Nose normal.      Mouth/Throat:      Mouth: Mucous membranes are moist.   Eyes:      Pupils: Pupils are equal, round, and reactive to light. Neck:      Musculoskeletal: Full passive range of motion without pain and normal range of motion. Normal range of motion. No edema, erythema, neck rigidity, crepitus, spinous process tenderness or muscular tenderness. Comments: No nuchal rigidity   Cardiovascular:      Rate and Rhythm: Normal rate and regular rhythm. Heart sounds: No murmur. No friction rub. No gallop.     Pulmonary:      Effort: Pulmonary effort is TRACE (*)     Leukocyte Esterase, Urine TRACE (*)     All other components within normal limits   MICROSCOPIC URINALYSIS - Abnormal; Notable for the following components:    RBC, UA 6-10 (*)     All other components within normal limits       All other labs were within normal range or not returned as of this dictation. EMERGENCY DEPARTMENT COURSE and DIFFERENTIAL DIAGNOSIS/MDM:   Vitals:    Vitals:    05/06/20 0334 05/06/20 0540 05/06/20 0623   BP: (!) 191/82 (!) 152/57 (!) 123/44   Pulse: 94 89 88   Resp: 18 16 16   Temp: 97.9 °F (36.6 °C)     TempSrc: Oral     SpO2: 96% 96% 98%   Weight: 164 lb (74.4 kg)     Height: 5' 1\" (1.549 m)       MDM     Pt is an 81 yo F who presents to the ED with headache and nausea. She is afebrile and hemodynamically stable. She was given 1 L IV NS, IV reglan, IV benadryl, and IV toradol in the ED. Labs unremarkable. UA negative for UTI. Patient reassessed with continued nausea and only mild improvement of pain. Given IV morphine and IV zofran which completely relieved the patients pain. Suspect migraine headache vs. Dehydration as the patient states she has not drank any water today. Less concern for stroke or infection. Pt stable for discharge. Given script for reglan. Will follow up with pcp in 1 day or return to the ED for worsening sx. Given headache warning signs. Pt understands and agrees to plan, all questions answered. REASSESSMENT          CRITICAL CARE TIME   Total Critical Care time was 0 minutes, excluding separately reportable procedures. There was a high probability of clinically significant/life threatening deterioration in the patient's condition which required my urgent intervention. CONSULTS:  None    PROCEDURES:  Unless otherwise noted below, none           FINAL IMPRESSION      1.  Nonintractable headache, unspecified chronicity pattern, unspecified headache type          DISPOSITION/PLAN   DISPOSITION Decision To Discharge 05/06/2020 06:40:39 AM      PATIENT REFERRED TO:  John Paul Bryant DO  [facility] [address]    Schedule an appointment as soon as possible for a visit in 1 day      [facility]) ED  [address] [address]  [phone]  Go to   As needed, If symptoms worsen      DISCHARGE MEDICATIONS:  Discharge Medication List as of 5/6/2020  5:59 AM        Controlled Substances Monitoring:     No flowsheet data found.     (Please note that portions of this note were completed with a voice recognition program.  Efforts were made to edit the dictations but occasionally words are mis-transcribed.)    Hema Anna PA-C (electronically signed)             Hema Anna PA-C  05/06/20 2009

## 2020-07-05 ENCOUNTER — APPOINTMENT (OUTPATIENT)
Dept: GENERAL RADIOLOGY | Age: 85
End: 2020-07-05
Payer: MEDICARE

## 2020-07-05 ENCOUNTER — HOSPITAL ENCOUNTER (EMERGENCY)
Age: 85
Discharge: HOME | End: 2020-07-05
Attending: EMERGENCY MEDICINE
Payer: MEDICARE

## 2020-07-05 VITALS
TEMPERATURE: 96.8 F | OXYGEN SATURATION: 96 % | HEART RATE: 72 BPM | SYSTOLIC BLOOD PRESSURE: 158 MMHG | HEIGHT: 61 IN | BODY MASS INDEX: 30.96 KG/M2 | WEIGHT: 164 LBS | RESPIRATION RATE: 18 BRPM | DIASTOLIC BLOOD PRESSURE: 72 MMHG

## 2020-07-05 LAB
EKG ATRIAL RATE: 73 BPM
EKG P AXIS: 31 DEGREES
EKG P-R INTERVAL: 142 MS
EKG Q-T INTERVAL: 448 MS
EKG QRS DURATION: 140 MS
EKG QTC CALCULATION (BAZETT): 493 MS
EKG R AXIS: 55 DEGREES
EKG T AXIS: 31 DEGREES
EKG VENTRICULAR RATE: 73 BPM

## 2020-07-05 PROCEDURE — 71045 X-RAY EXAM CHEST 1 VIEW: CPT

## 2020-07-05 PROCEDURE — 99283 EMERGENCY DEPT VISIT LOW MDM: CPT

## 2020-07-05 PROCEDURE — 93005 ELECTROCARDIOGRAM TRACING: CPT | Performed by: EMERGENCY MEDICINE

## 2020-07-05 ASSESSMENT — ENCOUNTER SYMPTOMS
NAUSEA: 0
EYE PAIN: 0
VOMITING: 0
CHEST TIGHTNESS: 0
ABDOMINAL PAIN: 0
SORE THROAT: 0
SHORTNESS OF BREATH: 0

## 2020-07-05 NOTE — ED PROVIDER NOTES
[facility] ED  eMERGENCY dEPARTMENTeNCOUnter      Pt Name: Aline Carr  MRN: [id number]  [place] 1935  Date ofevaluation: 7/5/2020  Provider: Marianela Salgado DO    CHIEF COMPLAINT       Chief Complaint   Patient presents with    Hypertension     htn yesterday and today. did not take bp meds today         HISTORY OF PRESENT ILLNESS   (Location/Symptom, Timing/Onset,Context/Setting, Quality, Duration, Modifying Factors, Severity)  Note limiting factors. Aline Carr is a 82 y.o. female who presents to the emergency department . Patient comes in because her blood pressures been high since yesterday. She woke up at 2 in the morning after having a nightmare. She comes in today having some shortness of breath and palpitations. No chest pain. He did not take her valsartan hand yet this morning. She takes her atenolol at night and is not due yet. Patient has longstanding history of anxiety and has been on benzos since the age of 18. In the last 4 years she was transitioned from Valium to Xanax. HPI    NursingNotes were reviewed. REVIEW OF SYSTEMS    (2-9 systems for level 4, 10 or more for level 5)     Review of Systems   Constitutional: Negative for activity change, appetite change, fatigue and fever. HENT: Negative for congestion and sore throat. Eyes: Negative for pain and visual disturbance. Respiratory: Negative for chest tightness and shortness of breath. Cardiovascular: Positive for palpitations. Negative for chest pain. Gastrointestinal: Negative for abdominal pain, nausea and vomiting. Endocrine: Negative for polydipsia. Genitourinary: Negative for flank pain and urgency. Musculoskeletal: Negative for gait problem and neck stiffness. Skin: Negative for rash. Neurological: Negative for weakness, light-headedness and headaches. Psychiatric/Behavioral: Negative for confusion and sleep disturbance. The patient is nervous/anxious.         Except as noted above the remainder of the review of systems was reviewed and negative. PAST MEDICAL HISTORY     Past Medical History:   Diagnosis Date    Anxiety     Cirrhosis ([address]     HTN (hypertension)          SURGICALHISTORY     History reviewed. No pertinent surgical history. CURRENT MEDICATIONS       Previous Medications    ACTIVATED VEGETABLE CHARCOAL (CHARCOCAPS) 260 MG CAPSULE    Take 1 capsule 4 times a day as needed    AMLODIPINE (NORVASC) 5 MG TABLET    Take 5 mg by mouth daily    ASPIRIN (ECOTRIN) 325 MG EC TABLET    Take 325 mg by mouth daily    ATENOLOL (TENORMIN) 25 MG TABLET    Take 25 mg by mouth daily    ATENOLOL (TENORMIN) 50 MG TABLET    Take 50 mg by mouth daily     CHOLESTYRAMINE (QUESTRAN) 4 G PACKET    Take 1 packet by mouth 2 times daily    DIAZEPAM (VALIUM) 2 MG TABLET    Take 2 mg by mouth 2 times daily. .    MELOXICAM (MOBIC) 7.5 MG TABLET    Take 7.5 mg by mouth as needed     METOCLOPRAMIDE (REGLAN) 10 MG TABLET    Take 1 tablet by mouth 4 times daily as needed (headache)    PROBIOTIC PRODUCT (PROBIOTIC DAILY PO)    Take 1 capsule by mouth    VALSARTAN (DIOVAN) 160 MG TABLET    Take 320 mg by mouth daily        ALLERGIES     Pcn [penicillins]    FAMILY HISTORY     History reviewed. No pertinent family history.        SOCIAL HISTORY       Social History     Socioeconomic History    Marital status:      Spouse name: None    Number of children: None    Years of education: None    Highest education level: None   Occupational History    None   Social Needs    Financial resource strain: None    Food insecurity     Worry: None     Inability: None    Transportation needs     Medical: None     Non-medical: None   Tobacco Use    Smoking status: Never Smoker    Smokeless tobacco: Never Used   Substance and Sexual Activity    Alcohol use: No    Drug use: No    Sexual activity: None   Lifestyle    Physical activity     Days per week: None     Minutes per session: None    Stress: None   Relationships    Social connections     Talks on phone: None     Gets together: None     Attends Hoahaoism service: None     Active member of club or organization: None     Attends meetings of clubs or organizations: None     Relationship status: None    Intimate partner violence     Fear of current or ex partner: None     Emotionally abused: None     Physically abused: None     Forced sexual activity: None   Other Topics Concern    None   Social History Narrative    None       SCREENINGS              PHYSICAL EXAM    (up to 7 for level 4, 8 or more for level 5)     ED Triage Vitals [07/05/20 0800]   BP Temp Temp Source Pulse Resp SpO2 Height Weight   (!) 169/89 96.8 °F (36 °C) Oral 83 17 94 % 5' 1\" (1.549 m) 164 lb (74.4 kg)       Physical Exam  Vitals signs and nursing note reviewed. Constitutional:       General: She is not in acute distress. Appearance: She is well-developed. She is not diaphoretic. HENT:      Head: Normocephalic and atraumatic. Right Ear: External ear normal.      Left Ear: External ear normal.      Mouth/Throat:      Pharynx: No oropharyngeal exudate. Eyes:      Conjunctiva/sclera: Conjunctivae normal.      Pupils: Pupils are equal, round, and reactive to light. Neck:      Musculoskeletal: Normal range of motion and neck supple. Thyroid: No thyromegaly. Vascular: No JVD. Trachea: No tracheal deviation. Cardiovascular:      Rate and Rhythm: Normal rate. Heart sounds: Normal heart sounds. No murmur. Pulmonary:      Effort: Pulmonary effort is normal. No respiratory distress. Breath sounds: Normal breath sounds. No wheezing. Abdominal:      General: Bowel sounds are normal.      Palpations: Abdomen is soft. Tenderness: There is no abdominal tenderness. There is no guarding. Musculoskeletal: Normal range of motion. Skin:     General: Skin is warm and dry. Findings: No rash.    Neurological:      Mental Status: She is alert and oriented to person, place, and time. Cranial Nerves: No cranial nerve deficit. Psychiatric:         Behavior: Behavior normal.      Comments: anxious         DIAGNOSTIC RESULTS     EKG: All EKG's are interpreted by the Emergency Department Physician who either signs or Co-signsthis chart in the absence of a cardiologist.    Normal sinus rhythm 73 bpm right bundle branch block no acute ischemia    RADIOLOGY:   Non-plain filmimages such as CT, Ultrasound and MRI are read by the radiologist. Plain radiographic images are visualized and preliminarily interpreted by the emergency physician with the below findings:    Chest x-ray shows no acute pulmonary disease    Interpretation per the Radiologist below, if available at the time ofthis note:    No orders to display         ED BEDSIDE ULTRASOUND:   Performed by ED Physician - none    LABS:  Labs Reviewed - No data to display    All other labs were within normal range or not returned as of this dictation. EMERGENCY DEPARTMENT COURSE and DIFFERENTIAL DIAGNOSIS/MDM:   Vitals:    Vitals:    07/05/20 0800   BP: (!) 169/89   Pulse: 83   Resp: 17   Temp: 96.8 °F (36 °C)   TempSrc: Oral   SpO2: 94%   Weight: 164 lb (74.4 kg)   Height: 5' 1\" (1.549 m)       Patient came in because blood pressure has been high for couple of days. Patient did not bother to take her medication this morning. After giving her her own medication which is valsartan, blood pressure came down. She calm down and she is feeling fine. She was not having a cardiac or neurologic event. MDM      REASSESSMENT          CRITICAL CARE TIME   Total Critical Care time was 0 minutes, excluding separatelyreportable procedures. There was a high probability ofclinically significant/life threatening deterioration in the patient's condition which required my urgent intervention.       CONSULTS:  None    PROCEDURES:  Unless otherwise noted below, none     Procedures    FINAL IMPRESSION      1. Essential hypertension    2. Anxiety state          DISPOSITION/PLAN   DISPOSITION        PATIENT REFERREDTO:  Marika Thomas DO  [facility] [id number]      As needed      DISCHARGEMEDICATIONS:  New Prescriptions    No medications on file     Controlled Substances Monitoring:     No flowsheet data found.     (Please note that portions of this note were completed with a voice recognition program.  Efforts were made to edit the dictations but occasionally words are mis-transcribed.)    Kranthi Patel DO (electronically signed)  Attending Emergency Physician          Will DO Jorge  07/05/20 1101 [facility]   07/05/20 1026

## 2020-07-05 NOTE — ED NOTES
Pt states she \"is feeling better. \" family at  bedside     April 322 Brendan Clifton RN  07/05/20 6864

## 2020-07-05 NOTE — ED TRIAGE NOTES
Pt states that she had a nightmare and wokeup sob, has high blood pressure the last 2 days, pt did not take her bp meds today, feels that she has some heart palpitations and feels drunk. Pt denies pain.

## 2020-07-06 PROCEDURE — 93010 ELECTROCARDIOGRAM REPORT: CPT | Performed by: INTERNAL MEDICINE

## 2020-07-09 ENCOUNTER — HOSPITAL ENCOUNTER (OUTPATIENT)
Dept: GENERAL RADIOLOGY | Age: 85
Discharge: HOME | End: 2020-07-11
Payer: MEDICARE

## 2020-07-09 PROCEDURE — 74018 RADEX ABDOMEN 1 VIEW: CPT

## 2020-07-16 ENCOUNTER — OFFICE VISIT (OUTPATIENT)
Dept: CARDIOLOGY CLINIC | Age: 85
End: 2020-07-16
Payer: MEDICARE

## 2020-07-16 VITALS
TEMPERATURE: 97 F | SYSTOLIC BLOOD PRESSURE: 132 MMHG | RESPIRATION RATE: 14 BRPM | OXYGEN SATURATION: 97 % | DIASTOLIC BLOOD PRESSURE: 68 MMHG | WEIGHT: 165 LBS | HEART RATE: 91 BPM | BODY MASS INDEX: 31.18 KG/M2

## 2020-07-16 PROBLEM — R27.0 ATAXIA, UNSPECIFIED: Status: ACTIVE | Noted: 2018-04-28

## 2020-07-16 PROBLEM — F41.9 ANXIETY DISORDER, UNSPECIFIED: Status: ACTIVE | Noted: 2018-04-28

## 2020-07-16 PROBLEM — I25.10 CORONARY ARTERY DISEASE INVOLVING NATIVE CORONARY ARTERY OF NATIVE HEART WITHOUT ANGINA PECTORIS: Status: ACTIVE | Noted: 2018-01-08

## 2020-07-16 PROBLEM — R94.31 ABNORMAL FINDING ON EKG: Status: ACTIVE | Noted: 2018-01-08

## 2020-07-16 PROBLEM — R00.2 HEART PALPITATIONS: Status: ACTIVE | Noted: 2018-04-28

## 2020-07-16 PROBLEM — G45.9 TIA (TRANSIENT ISCHEMIC ATTACK): Status: ACTIVE | Noted: 2018-01-08

## 2020-07-16 PROCEDURE — 99204 OFFICE O/P NEW MOD 45 MIN: CPT | Performed by: INTERNAL MEDICINE

## 2020-07-16 PROCEDURE — G8427 DOCREV CUR MEDS BY ELIG CLIN: HCPCS | Performed by: INTERNAL MEDICINE

## 2020-07-16 PROCEDURE — G8417 CALC BMI ABV UP PARAM F/U: HCPCS | Performed by: INTERNAL MEDICINE

## 2020-07-16 PROCEDURE — 1090F PRES/ABSN URINE INCON ASSESS: CPT | Performed by: INTERNAL MEDICINE

## 2020-07-16 RX ORDER — ATORVASTATIN CALCIUM 20 MG/1
20 TABLET, FILM COATED ORAL NIGHTLY
COMMUNITY

## 2020-07-16 RX ORDER — AMLODIPINE BESYLATE 2.5 MG/1
2.5 TABLET ORAL DAILY
Status: ON HOLD | COMMUNITY
End: 2021-06-10

## 2020-07-16 RX ORDER — LANOLIN ALCOHOL/MO/W.PET/CERES
2000 CREAM (GRAM) TOPICAL DAILY
COMMUNITY
End: 2022-07-10

## 2020-07-16 RX ORDER — VALSARTAN 320 MG/1
320 TABLET ORAL DAILY
COMMUNITY
End: 2022-07-10

## 2020-07-16 NOTE — PROGRESS NOTES
[address] [facility] OFFICE CONSULT      Patient: Lucio Lewis  YOB: 1935  MRN: [id number]    Chief Complaint:  Chief Complaint   Patient presents with    Establish Cardiologist     SELF REFERRAL    Follow-Up from [address] [place] [address] 7/5/20    Coronary Artery Disease       Subjective/HPI    The patient presents for evaluation as a referral from self     The patient has been having the following problems: palpitations    Cardiac diagnoses prior to this visit: CAD, prior CABG    Testing performed prior to this visit: none    The patient reports palps    Additional history includes HTN, HPL. EKG: NSr no ischemia    Past Medical History:   Diagnosis Date    Anxiety     [address] [address]     Coronary artery disease involving native coronary artery of native heart without angina pectoris 1/8/2018    HTN (hypertension)        No past surgical history on file. No family history on file.     Social History     Socioeconomic History    Marital status:      Spouse name: None    Number of children: None    Years of education: None    Highest education level: None   Occupational History    None   Social Needs    Financial resource strain: None    Food insecurity     Worry: None     Inability: None    Transportation needs     Medical: None     Non-medical: None   Tobacco Use    Smoking status: Never Smoker    Smokeless tobacco: Never Used   Substance and Sexual Activity    Alcohol use: No    Drug use: No    Sexual activity: None   Lifestyle    Physical activity     Days per week: None     Minutes per session: None    Stress: None   Relationships    Social connections     Talks on phone: None     Gets together: None     Attends Judaism service: None     Active member of club or organization: None     Attends meetings of clubs or organizations: None     Relationship status: None    Intimate partner violence     Fear of current or ex partner: None     Emotionally abused: None Physically abused: None     Forced sexual activity: None   Other Topics Concern    None   Social History Narrative    None       Allergies   Allergen Reactions    Carbapenems     Cephalosporins     Hydralazine Other (See Comments)    Pcn [Penicillins]          Review of Systems:   Review of Systems      Physical Examination:    /68 (Site: Left Upper Arm, Position: Sitting, Cuff Size: Large Adult)   Pulse 91   Temp 97 °F (36.1 °C) (Infrared)   Resp 14   Wt 165 lb (74.8 kg)   SpO2 97%   BMI 31.18 kg/m²    Physical Exam    LABS:  CBC:   Lab Results   Component Value Date    WBC 9.1 05/06/2020    RBC 4.84 05/06/2020    HGB 14.3 05/06/2020    HCT 42.5 05/06/2020    MCV 87.8 05/06/2020    MCH 29.4 05/06/2020    MCHC 33.5 05/06/2020    RDW 13.2 05/06/2020     05/06/2020    MPV 8.7 09/17/2014     Lipids:  Lab Results   Component Value Date    CHOL 156 03/08/2019    CHOL 154 04/23/2018    CHOL 204 (H) 07/19/2016     Lab Results   Component Value Date    TRIG 149 03/08/2019    TRIG 161 04/23/2018    TRIG 193 07/19/2016     Lab Results   Component Value Date    HDL 51 03/08/2019    HDL 51 04/23/2018    HDL 47 07/19/2016     Lab Results   Component Value Date    LDLCALC 75 03/08/2019    LDLCALC 71 04/23/2018    LDLCALC 118 07/19/2016     No results found for: LABVLDL, VLDL  Lab Results   Component Value Date    CHOLHDLRATIO 4.6 02/10/2012     CMP:    Lab Results   Component Value Date     05/06/2020    K 4.3 05/06/2020    CL 99 05/06/2020    CO2 27 05/06/2020    BUN 11 05/06/2020    CREATININE 0.58 05/06/2020    GFRAA >60.0 05/06/2020    LABGLOM >60.0 05/06/2020    GLUCOSE 115 05/06/2020    PROT 7.7 05/06/2020    LABALBU 4.3 05/06/2020    CALCIUM 9.6 05/06/2020    BILITOT 0.3 05/06/2020    ALKPHOS 74 05/06/2020    AST 24 05/06/2020    ALT 21 05/06/2020     BMP:    Lab Results   Component Value Date     05/06/2020    K 4.3 05/06/2020    CL 99 05/06/2020    CO2 27 05/06/2020    BUN 11 05/06/2020    LABALBU 4.3 05/06/2020    CREATININE 0.58 05/06/2020    CALCIUM 9.6 05/06/2020    GFRAA >60.0 05/06/2020    LABGLOM >60.0 05/06/2020    GLUCOSE 115 05/06/2020     Magnesium:  No results found for: MG  TSH:  Lab Results   Component Value Date    TSH 3.160 06/23/2019       Patient Active Problem List   Diagnosis    Abnormal finding on EKG    Anxiety disorder, unspecified    Ataxia, unspecified    Coronary artery disease involving native coronary artery of native heart without angina pectoris    Heart palpitations    TIA (transient ischemic attack)       Current Outpatient Medications   Medication Sig Dispense Refill    valsartan (DIOVAN) 320 MG tablet Take 320 mg by mouth daily      amLODIPine (NORVASC) 2.5 MG tablet Take 2.5 mg by mouth daily      atorvastatin (LIPITOR) 20 MG tablet Take 20 mg by mouth nightly      vitamin B-12 (CYANOCOBALAMIN) 1000 MCG tablet Take 2,000 mcg by mouth daily      Probiotic Product (PROBIOTIC DAILY PO) Take 1 capsule by mouth      atenolol (TENORMIN) 25 MG tablet Take 25 mg by mouth daily  0    aspirin (ECOTRIN) 325 MG EC tablet Take 325 mg by mouth daily      diazepam (VALIUM) 2 MG tablet Take 2 mg by mouth 2 times daily. Stephen Sanz metoclopramide (REGLAN) 10 MG tablet Take 1 tablet by mouth 4 times daily as needed (headache) 20 tablet 0     No current facility-administered medications for this visit. Assessment/Plan:    1. Palpitations    - Cardiac event monitor; Future  - ECHO Complete 2D W Doppler W Color; Future       Counseling:  Heart Healthy Lifestyle, Improve BMI and Walk Daily     Return in about 6 weeks (around 8/27/2020).       Electronically signed by Otoniel Cardenas MD on 7/16/2020 at 4:13 PM

## 2020-07-31 ENCOUNTER — HOSPITAL ENCOUNTER (EMERGENCY)
Age: 85
Discharge: HOME | End: 2020-07-31
Attending: EMERGENCY MEDICINE
Payer: MEDICARE

## 2020-07-31 ENCOUNTER — APPOINTMENT (OUTPATIENT)
Dept: GENERAL RADIOLOGY | Age: 85
End: 2020-07-31
Payer: MEDICARE

## 2020-07-31 VITALS
HEART RATE: 64 BPM | TEMPERATURE: 99 F | OXYGEN SATURATION: 97 % | WEIGHT: 165 LBS | SYSTOLIC BLOOD PRESSURE: 148 MMHG | RESPIRATION RATE: 18 BRPM | BODY MASS INDEX: 30.36 KG/M2 | HEIGHT: 62 IN | DIASTOLIC BLOOD PRESSURE: 62 MMHG

## 2020-07-31 LAB
ALBUMIN SERPL-MCNC: 4.2 G/DL (ref 3.5–4.6)
ALP BLD-CCNC: 66 U/L (ref 40–130)
ALT SERPL-CCNC: 19 U/L (ref 0–33)
ANION GAP SERPL CALCULATED.3IONS-SCNC: 8 MEQ/L (ref 9–15)
APTT: 24.6 SEC (ref 24.4–36.8)
AST SERPL-CCNC: 23 U/L (ref 0–35)
BASOPHILS ABSOLUTE: 0.1 K/UL (ref 0–0.2)
BASOPHILS RELATIVE PERCENT: 0.7 %
BILIRUB SERPL-MCNC: 0.3 MG/DL (ref 0.2–0.7)
BUN BLDV-MCNC: 15 MG/DL (ref 8–23)
CALCIUM SERPL-MCNC: 9.1 MG/DL (ref 8.5–9.9)
CHLORIDE BLD-SCNC: 100 MEQ/L (ref 95–107)
CO2: 31 MEQ/L (ref 20–31)
CREAT SERPL-MCNC: 0.61 MG/DL (ref 0.5–0.9)
EKG ATRIAL RATE: 73 BPM
EKG P AXIS: 13 DEGREES
EKG P-R INTERVAL: 138 MS
EKG Q-T INTERVAL: 458 MS
EKG QRS DURATION: 128 MS
EKG QTC CALCULATION (BAZETT): 504 MS
EKG R AXIS: 59 DEGREES
EKG T AXIS: 65 DEGREES
EKG VENTRICULAR RATE: 73 BPM
EOSINOPHILS ABSOLUTE: 0.1 K/UL (ref 0–0.7)
EOSINOPHILS RELATIVE PERCENT: 0.9 %
GFR AFRICAN AMERICAN: >60
GFR NON-AFRICAN AMERICAN: >60
GLOBULIN: 3.2 G/DL (ref 2.3–3.5)
GLUCOSE BLD-MCNC: 117 MG/DL (ref 70–99)
HCT VFR BLD CALC: 42.4 % (ref 37–47)
HEMOGLOBIN: 14.3 G/DL (ref 12–16)
INR BLD: 1
LYMPHOCYTES ABSOLUTE: 1.8 K/UL (ref 1–4.8)
LYMPHOCYTES RELATIVE PERCENT: 22.6 %
MCH RBC QN AUTO: 30.2 PG (ref 27–31.3)
MCHC RBC AUTO-ENTMCNC: 33.8 % (ref 33–37)
MCV RBC AUTO: 89.3 FL (ref 82–100)
MONOCYTES ABSOLUTE: 0.9 K/UL (ref 0.2–0.8)
MONOCYTES RELATIVE PERCENT: 11.1 %
NEUTROPHILS ABSOLUTE: 5.3 K/UL (ref 1.4–6.5)
NEUTROPHILS RELATIVE PERCENT: 64.7 %
PDW BLD-RTO: 13.6 % (ref 11.5–14.5)
PLATELET # BLD: 264 K/UL (ref 130–400)
POTASSIUM REFLEX MAGNESIUM: 4.3 MEQ/L (ref 3.4–4.9)
PRO-BNP: 186 PG/ML
PROTHROMBIN TIME: 13.6 SEC (ref 12.3–14.9)
RBC # BLD: 4.75 M/UL (ref 4.2–5.4)
SODIUM BLD-SCNC: 139 MEQ/L (ref 135–144)
TOTAL PROTEIN: 7.4 G/DL (ref 6.3–8)
TROPONIN: <0.01 NG/ML (ref 0–0.01)
WBC # BLD: 8.2 K/UL (ref 4.8–10.8)

## 2020-07-31 PROCEDURE — 83880 ASSAY OF NATRIURETIC PEPTIDE: CPT

## 2020-07-31 PROCEDURE — 84484 ASSAY OF TROPONIN QUANT: CPT

## 2020-07-31 PROCEDURE — 85610 PROTHROMBIN TIME: CPT

## 2020-07-31 PROCEDURE — 36415 COLL VENOUS BLD VENIPUNCTURE: CPT

## 2020-07-31 PROCEDURE — 99285 EMERGENCY DEPT VISIT HI MDM: CPT

## 2020-07-31 PROCEDURE — 71045 X-RAY EXAM CHEST 1 VIEW: CPT

## 2020-07-31 PROCEDURE — 93010 ELECTROCARDIOGRAM REPORT: CPT | Performed by: INTERNAL MEDICINE

## 2020-07-31 PROCEDURE — 80053 COMPREHEN METABOLIC PANEL: CPT

## 2020-07-31 PROCEDURE — 85730 THROMBOPLASTIN TIME PARTIAL: CPT

## 2020-07-31 PROCEDURE — 93005 ELECTROCARDIOGRAM TRACING: CPT | Performed by: EMERGENCY MEDICINE

## 2020-07-31 PROCEDURE — 85025 COMPLETE CBC W/AUTO DIFF WBC: CPT

## 2020-07-31 NOTE — ED NOTES
Pt is negative on orthostatic blood pressure. Pt is Dizzy when she moves but her blood pressure increased when she transitions from lying, sitting, and standing. Pt has history of vertigo.        Saul Santana RN  07/31/20 1126

## 2020-07-31 NOTE — ED PROVIDER NOTES
HPI:  7/31/20, Time: 10:54 AM EDT         Aniceto Marroquin is a 82 y.o. female presenting to the ED for palpitations, beginning 1 week ago. The complaint has been intermittent, moderate in severity, and worsened by standing. Patient has nausea as well as presyncope with standing. This is occurred over the past week there is been no chest pain no shortness of breath. No loss of consciousness no headache and no focal weakness. No recent medication changes. No exertional angina she states she has been under an extraordinary amount of stress taking care of her daughter who has mental illness that is living with her  ROS:   Pertinent positives and negatives are stated within HPI, all other systems reviewed and are negative.  --------------------------------------------- PAST HISTORY ---------------------------------------------  Past Medical History:  has a past medical history of Anxiety, Cirrhosis ([address] Coronary artery disease involving native coronary artery of native heart without angina pectoris, and HTN (hypertension). Past Surgical History:  has no past surgical history on file. Social History:  reports that she has never smoked. She has never used smokeless tobacco. She reports that she does not drink alcohol or use drugs. Family History: family history is not on file. The patients home medications have been reviewed. Allergies: Carbapenems; Cephalosporins; Hydralazine; and Pcn [penicillins]    ---------------------------------------------------PHYSICAL EXAM--------------------------------------     Constitutional/General: Alert and oriented x3, well appearing, non toxic in NAD  Head: Normocephalic and atraumatic  Eyes: PERRL, EOMI  Mouth: Oropharynx clear, handling secretions, no trismus  Neck: Supple, full ROM, non tender to palpation in the midline, no stridor, no crepitus, no meningeal signs  Pulmonary: Lungs clear to auscultation bilaterally, no wheezes, rales, or rhonchi.  Not in respiratory distress  Cardiovascular:  Regular rate. Regular rhythm. No murmurs, gallops, or rubs. 2+ distal pulses  Chest: no chest wall tenderness  Abdomen: Soft. Non tender. Non distended. +BS. No rebound, guarding, or rigidity. No pulsatile masses appreciated. Musculoskeletal: Moves all extremities x 4. Warm and well perfused, no clubbing, cyanosis, or edema. Capillary refill <3 seconds  Skin: warm and dry. No rashes. Neurologic: GCS 15, CN 2-12 grossly intact, no focal deficits, symmetric strength 5/5 in the upper and lower extremities bilaterally  Psych: Anxious affect she is not tearful. There is no suicidal nor homicidal ideation    -------------------------------------------------- RESULTS -------------------------------------------------  I have personally reviewed all laboratory and imaging results for this patient. Results are listed below.      LABS:  Results for orders placed or performed during the hospital encounter of 07/31/20   CBC Auto Differential   Result Value Ref Range    WBC 8.2 4.8 - 10.8 K/uL    RBC 4.75 4.20 - 5.40 M/uL    Hemoglobin 14.3 12.0 - 16.0 g/dL    Hematocrit 42.4 37.0 - 47.0 %    MCV 89.3 82.0 - 100.0 fL    MCH 30.2 27.0 - 31.3 pg    MCHC 33.8 33.0 - 37.0 %    RDW 13.6 11.5 - 14.5 %    Platelets 571 952 - 318 K/uL    Neutrophils % 64.7 %    Lymphocytes % 22.6 %    Monocytes % 11.1 %    Eosinophils % 0.9 %    Basophils % 0.7 %    Neutrophils Absolute 5.3 1.4 - 6.5 K/uL    Lymphocytes Absolute 1.8 1.0 - 4.8 K/uL    Monocytes Absolute 0.9 (H) 0.2 - 0.8 K/uL    Eosinophils Absolute 0.1 0.0 - 0.7 K/uL    Basophils Absolute 0.1 0.0 - 0.2 K/uL   Comprehensive Metabolic Panel w/ Reflex to MG   Result Value Ref Range    Sodium 139 135 - 144 mEq/L    Potassium reflex Magnesium 4.3 3.4 - 4.9 mEq/L    Chloride 100 95 - 107 mEq/L    CO2 31 20 - 31 mEq/L    Anion Gap 8 (L) 9 - 15 mEq/L    Glucose 117 (H) 70 - 99 mg/dL    BUN 15 8 - 23 mg/dL    CREATININE 0.61 0.50 - 0.90 mg/dL    GFR Non- >60.0 >60    GFR  >60.0 >60    Calcium 9.1 8.5 - 9.9 mg/dL    Total Protein 7.4 6.3 - 8.0 g/dL    Alb 4.2 3.5 - 4.6 g/dL    Total Bilirubin 0.3 0.2 - 0.7 mg/dL    Alkaline Phosphatase 66 40 - 130 U/L    ALT 19 0 - 33 U/L    AST 23 0 - 35 U/L    Globulin 3.2 2.3 - 3.5 g/dL   Troponin   Result Value Ref Range    Troponin <0.010 0.000 - 0.010 ng/mL   Brain Natriuretic Peptide   Result Value Ref Range    Pro- pg/mL   APTT   Result Value Ref Range    aPTT 24.6 24.4 - 36.8 sec   Protime-INR   Result Value Ref Range    Protime 13.6 12.3 - 14.9 sec    INR 1.0    EKG 12 Lead   Result Value Ref Range    Ventricular Rate 73 BPM    Atrial Rate 73 BPM    P-R Interval 138 ms    QRS Duration 128 ms    Q-T Interval 458 ms    QTc Calculation (Bazett) 504 ms    P Axis 13 degrees    R Axis 59 degrees    T Axis 65 degrees       RADIOLOGY:  Interpreted by Radiologist.  XR CHEST PORTABLE    (Results Pending)         EKG Interpretation  Interpreted by emergency department physician    Rhythm: normal sinus   Rate: normal  Axis: normal  Conduction: right bundle branch block (incomplete)  ST Segments: no acute change and normal  T Waves: no acute change    Clinical Impression: right bundle branch block  Comparison to prior EKG: no previous EKG      ------------------------- NURSING NOTES AND VITALS REVIEWED ---------------------------   The nursing notes within the ED encounter and vital signs as below have been reviewed by myself. BP (!) 148/62   Pulse 64   Temp 99 °F (37.2 °C) (Oral)   Resp 18   Ht 5' 2\" (1.575 m)   Wt 165 lb (74.8 kg)   SpO2 97%   BMI 30.18 kg/m²   Oxygen Saturation Interpretation: Normal    The patients available past medical records and past encounters were reviewed. ------------------------------ ED COURSE/MEDICAL DECISION MAKING----------------------  Medications - No data to display          Medical Decision Making:    EKG shows a normal sinus rhythm.   She is not orthostatic. I have ordered chest x-ray and lab work. She remained on a cardiac monitor in the ER for several hours and a normal sinus rhythm. During that time. She stated she was having palpitations. She has been evaluated by cardiology and is scheduled for an echo and a Holter monitor  Her lab work was unremarkable    Re-Evaluations:             Re-evaluation. Patients symptoms are improving      Consultations: This patient's ED course included: re-evaluation prior to disposition, cardiac monitoring, continuous pulse oximetry and a personal history and physicial eaxmination    This patient has remained hemodynamically stable and improved during their ED course. Counseling: The emergency provider has spoken with the patient and discussed todays results, in addition to providing specific details for the plan of care and counseling regarding the diagnosis and prognosis. Questions are answered at this time and they are agreeable with the plan.       --------------------------------- IMPRESSION AND DISPOSITION ---------------------------------    IMPRESSION  1. Palpitations    2. Anxiety state        DISPOSITION  Disposition: Discharge to home  Patient condition is good        NOTE: This report was transcribed using voice recognition software.  Every effort was made to ensure accuracy; however, inadvertent computerized transcription errors may be present          Ramon Coffey MD  07/31/20 8661

## 2020-09-17 ENCOUNTER — HOSPITAL ENCOUNTER (OUTPATIENT)
Dept: LAB | Age: 85
Discharge: HOME | End: 2020-09-17
Payer: MEDICARE

## 2020-09-17 LAB
ALBUMIN SERPL-MCNC: 4.2 G/DL (ref 3.5–4.6)
ALP BLD-CCNC: 69 U/L (ref 40–130)
ALT SERPL-CCNC: 19 U/L (ref 0–33)
ANION GAP SERPL CALCULATED.3IONS-SCNC: 14 MEQ/L (ref 9–15)
AST SERPL-CCNC: 18 U/L (ref 0–35)
BASOPHILS ABSOLUTE: 0 K/UL (ref 0–0.2)
BASOPHILS RELATIVE PERCENT: 0.6 %
BILIRUB SERPL-MCNC: 0.4 MG/DL (ref 0.2–0.7)
BUN BLDV-MCNC: 14 MG/DL (ref 8–23)
CALCIUM SERPL-MCNC: 9.5 MG/DL (ref 8.5–9.9)
CHLORIDE BLD-SCNC: 102 MEQ/L (ref 95–107)
CO2: 28 MEQ/L (ref 20–31)
CREAT SERPL-MCNC: 0.56 MG/DL (ref 0.5–0.9)
EOSINOPHILS ABSOLUTE: 0.1 K/UL (ref 0–0.7)
EOSINOPHILS RELATIVE PERCENT: 1.2 %
GFR AFRICAN AMERICAN: >60
GFR NON-AFRICAN AMERICAN: >60
GLOBULIN: 3.4 G/DL (ref 2.3–3.5)
GLUCOSE BLD-MCNC: 78 MG/DL (ref 70–99)
HCT VFR BLD CALC: 43 % (ref 37–47)
HEMOGLOBIN: 13.9 G/DL (ref 12–16)
LYMPHOCYTES ABSOLUTE: 1.8 K/UL (ref 1–4.8)
LYMPHOCYTES RELATIVE PERCENT: 25.6 %
MCH RBC QN AUTO: 29.3 PG (ref 27–31.3)
MCHC RBC AUTO-ENTMCNC: 32.3 % (ref 33–37)
MCV RBC AUTO: 90.6 FL (ref 82–100)
MONOCYTES ABSOLUTE: 0.7 K/UL (ref 0.2–0.8)
MONOCYTES RELATIVE PERCENT: 9.5 %
NEUTROPHILS ABSOLUTE: 4.4 K/UL (ref 1.4–6.5)
NEUTROPHILS RELATIVE PERCENT: 63.1 %
PDW BLD-RTO: 13.8 % (ref 11.5–14.5)
PLATELET # BLD: 294 K/UL (ref 130–400)
POTASSIUM SERPL-SCNC: 3.8 MEQ/L (ref 3.4–4.9)
RBC # BLD: 4.75 M/UL (ref 4.2–5.4)
SODIUM BLD-SCNC: 144 MEQ/L (ref 135–144)
TOTAL PROTEIN: 7.6 G/DL (ref 6.3–8)
WBC # BLD: 7 K/UL (ref 4.8–10.8)

## 2020-09-17 PROCEDURE — 85025 COMPLETE CBC W/AUTO DIFF WBC: CPT

## 2020-09-17 PROCEDURE — 80053 COMPREHEN METABOLIC PANEL: CPT

## 2021-03-08 ENCOUNTER — HOSPITAL ENCOUNTER (EMERGENCY)
Age: 86
Discharge: HOME | End: 2021-03-08
Attending: EMERGENCY MEDICINE
Payer: MEDICARE

## 2021-03-08 VITALS
RESPIRATION RATE: 20 BRPM | OXYGEN SATURATION: 94 % | HEIGHT: 62 IN | SYSTOLIC BLOOD PRESSURE: 157 MMHG | WEIGHT: 165 LBS | TEMPERATURE: 98.4 F | HEART RATE: 90 BPM | BODY MASS INDEX: 30.36 KG/M2 | DIASTOLIC BLOOD PRESSURE: 76 MMHG

## 2021-03-08 DIAGNOSIS — F41.1 ANXIETY STATE: Primary | ICD-10-CM

## 2021-03-08 LAB
EKG ATRIAL RATE: 83 BPM
EKG P AXIS: 28 DEGREES
EKG P-R INTERVAL: 124 MS
EKG Q-T INTERVAL: 402 MS
EKG QRS DURATION: 132 MS
EKG QTC CALCULATION (BAZETT): 472 MS
EKG R AXIS: 64 DEGREES
EKG T AXIS: 49 DEGREES
EKG VENTRICULAR RATE: 83 BPM

## 2021-03-08 PROCEDURE — 99282 EMERGENCY DEPT VISIT SF MDM: CPT

## 2021-03-08 PROCEDURE — 93005 ELECTROCARDIOGRAM TRACING: CPT | Performed by: EMERGENCY MEDICINE

## 2021-03-08 ASSESSMENT — ENCOUNTER SYMPTOMS
SHORTNESS OF BREATH: 0
ABDOMINAL PAIN: 0
PHOTOPHOBIA: 0
VOMITING: 0
COLOR CHANGE: 0
ABDOMINAL DISTENTION: 0
FACIAL SWELLING: 0
RHINORRHEA: 0
EYE DISCHARGE: 0
WHEEZING: 0

## 2021-03-08 ASSESSMENT — PAIN DESCRIPTION - PAIN TYPE: TYPE: CHRONIC PAIN

## 2021-03-09 NOTE — ED PROVIDER NOTES
[address] [facility] ED  eMERGENCY dEPARTMENT eNCOUnter      Pt Name: Mamadou Ramos  MRN: [id number]  [place] 1935  Date of evaluation: 3/8/2021  Provider: Chris Dodge, [address] [place] [address]       Chief Complaint   Patient presents with    Chest Pain     two weeks (comes and goes) pt states she has a lot of stress going on          HISTORY OF PRESENT ILLNESS   (Location/Symptom, Timing/Onset,Context/Setting, Quality, Duration, Modifying Factors, Severity)  Note limiting factors. Mamadou Ramos is a 82 y.o. female who presents to the emergency department with a chief complaint of serious stress and anxiety. Patient states that she has been anxious her whole life and on Valium since her 25s. She has had a significant increase in stress between her  and her daughter with her medical problems and is having more panic attacks and anxiety that she can feel in her chest. She knows this is her anxiety and is recognizing it as such however she wanted to be reassured by a negative EKG and came to the emergency department for this specifically. HPI    NursingNotes were reviewed. REVIEW OF SYSTEMS    (2-9 systems for level 4, 10 or more for level 5)     Review of Systems   Constitutional: Negative for activity change and appetite change. HENT: Negative for congestion, facial swelling and rhinorrhea. Eyes: Negative for photophobia and discharge. Respiratory: Negative for shortness of breath and wheezing. Cardiovascular: Negative for chest pain. Gastrointestinal: Negative for abdominal distention, abdominal pain and vomiting. Endocrine: Negative for polydipsia and polyphagia. Genitourinary: Negative for difficulty urinating, frequency, vaginal bleeding and vaginal discharge. Musculoskeletal: Negative for gait problem. Skin: Negative for color change. Allergic/Immunologic: Negative for immunocompromised state.    Neurological: Negative for dizziness, weakness and light-headedness. Hematological: Negative for adenopathy. Psychiatric/Behavioral: Negative for behavioral problems. The patient is nervous/anxious. Except as noted above the remainder of the review of systems was reviewed and negative. PAST MEDICAL HISTORY     Past Medical History:   Diagnosis Date    Anxiety     Cirrhosis ([facility] [address]     Coronary artery disease involving native coronary artery of native heart without angina pectoris 1/8/2018    HTN (hypertension)          SURGICALHISTORY     History reviewed. No pertinent surgical history. CURRENT MEDICATIONS       Previous Medications    AMLODIPINE (NORVASC) 2.5 MG TABLET    Take 2.5 mg by mouth daily    ASPIRIN (ECOTRIN) 325 MG EC TABLET    Take 325 mg by mouth daily    ATENOLOL (TENORMIN) 25 MG TABLET    Take 25 mg by mouth daily    ATORVASTATIN (LIPITOR) 20 MG TABLET    Take 20 mg by mouth nightly    DIAZEPAM (VALIUM) 2 MG TABLET    Take 2 mg by mouth 2 times daily. Winifred Hernandez METOCLOPRAMIDE (REGLAN) 10 MG TABLET    Take 1 tablet by mouth 4 times daily as needed (headache)    PROBIOTIC PRODUCT (PROBIOTIC DAILY PO)    Take 1 capsule by mouth    VALSARTAN (DIOVAN) 320 MG TABLET    Take 320 mg by mouth daily    VITAMIN B-12 (CYANOCOBALAMIN) 1000 MCG TABLET    Take 2,000 mcg by mouth daily       ALLERGIES     Carbapenems, Cephalosporins, Hydralazine, and Pcn [penicillins]    FAMILY HISTORY     History reviewed. No pertinent family history.        SOCIAL HISTORY       Social History     Socioeconomic History    Marital status:      Spouse name: None    Number of children: None    Years of education: None    Highest education level: None   Occupational History    None   Social Needs    Financial resource strain: None    Food insecurity     Worry: None     Inability: None    Transportation needs     Medical: None     Non-medical: None   Tobacco Use    Smoking status: Never Smoker    Smokeless tobacco: Never Used   Substance and Sexual rhythm at 83 bpm with a right bundle branch block. No acute ST segment elevation or T wave inversion    RADIOLOGY:   Non-plain filmimages such as CT, Ultrasound and MRI are read by the radiologist. Plain radiographic images are visualized and preliminarily interpreted by the emergency physician with the below findings:        Interpretation per the Radiologist below, if available at the time ofthis note:    No orders to display         ED BEDSIDE ULTRASOUND:   Performed by ED Physician - none    LABS:  Labs Reviewed - No data to display    All other labs were within normal range or not returned as of this dictation. EMERGENCY DEPARTMENT COURSE and DIFFERENTIAL DIAGNOSIS/MDM:   Vitals:    Vitals:    03/08/21 1633   BP: (!) 157/76   Pulse: 90   Resp: 20   Temp: 98.4 °F (36.9 °C)   TempSrc: Oral   SpO2: 94%   Weight: 165 lb (74.8 kg)   Height: 5' 2\" (1.575 m)       We discussed how an EKG is reassuring but it is a snapshot in time and this does not mean that she is not having angina. Patient wants to talk primarily about her daughter's medical problems and her anxiety and stress in her life. Every time I ask her about herself she starts to tell me more about her daughter. She is obviously very distressed over her daughter's health. She does not want anything for anxiety here and doesn't need anything for anxiety for home as she has valium. She is smiling and feeling reassured by the EKG and is discharged home in stable condition. Signs and symptoms which should prompt her return to the emergency department are reviewed and patient states her understanding of these at time of disposition. MDM    CRITICAL CARE TIME   Total Critical Care time was 0 minutes, excluding separately reportableprocedures. There was a high probability of clinicallysignificant/life threatening deterioration in the patient's condition which required my urgent intervention.       CONSULTS:  None    PROCEDURES:  Unless otherwise noted below, none     Procedures    FINAL IMPRESSION      1.  Anxiety state          DISPOSITION/PLAN   DISPOSITION Decision To Discharge 03/08/2021 07:02:22 PM      PATIENT REFERRED TO:  Nadege Friedman DO  [address]  [phone]      As needed      DISCHARGE MEDICATIONS:  New Prescriptions    No medications on file          (Please note that portions of this note were completed with a voice recognition program.  Efforts were made to edit the dictations but occasionally words are mis-transcribed.)    Maria Antonia Schmidt DO (electronically signed)  Attending Emergency Physician          Maria Antonia Schmidt DO  03/08/21 8237

## 2021-03-09 NOTE — ED NOTES
Discharge  instructions given and reviewed. Patient verbalized understanding. Patient ambulated out of ED with a steady gait to POV.      Jaquelin Bustos RN  03/08/21 1299

## 2021-03-10 PROCEDURE — 93010 ELECTROCARDIOGRAM REPORT: CPT | Performed by: INTERNAL MEDICINE

## 2021-04-09 ENCOUNTER — HOSPITAL ENCOUNTER (OUTPATIENT)
Dept: LAB | Age: 86
Discharge: HOME | End: 2021-04-09
Payer: MEDICARE

## 2021-04-09 LAB
ALBUMIN SERPL-MCNC: 4.4 G/DL (ref 3.5–4.6)
ALP BLD-CCNC: 72 U/L (ref 40–130)
ALT SERPL-CCNC: 16 U/L (ref 0–33)
ANION GAP SERPL CALCULATED.3IONS-SCNC: 13 MEQ/L (ref 9–15)
AST SERPL-CCNC: 17 U/L (ref 0–35)
BASOPHILS ABSOLUTE: 0 K/UL (ref 0–0.2)
BASOPHILS RELATIVE PERCENT: 0.4 %
BILIRUB SERPL-MCNC: 0.3 MG/DL (ref 0.2–0.7)
BUN BLDV-MCNC: 20 MG/DL (ref 8–23)
CALCIUM SERPL-MCNC: 9.6 MG/DL (ref 8.5–9.9)
CHLORIDE BLD-SCNC: 108 MEQ/L (ref 95–107)
CHOLESTEROL, TOTAL: 157 MG/DL (ref 0–199)
CO2: 23 MEQ/L (ref 20–31)
CREAT SERPL-MCNC: 0.61 MG/DL (ref 0.5–0.9)
EOSINOPHILS ABSOLUTE: 0.1 K/UL (ref 0–0.7)
EOSINOPHILS RELATIVE PERCENT: 1.9 %
GFR AFRICAN AMERICAN: >60
GFR NON-AFRICAN AMERICAN: >60
GLOBULIN: 3 G/DL (ref 2.3–3.5)
GLUCOSE BLD-MCNC: 92 MG/DL (ref 70–99)
HBA1C MFR BLD: 5.8 % (ref 4.8–5.9)
HCT VFR BLD CALC: 41.1 % (ref 37–47)
HDLC SERPL-MCNC: 54 MG/DL (ref 40–59)
HEMOGLOBIN: 13.5 G/DL (ref 12–16)
LDL CHOLESTEROL CALCULATED: 73 MG/DL (ref 0–129)
LYMPHOCYTES ABSOLUTE: 1.8 K/UL (ref 1–4.8)
LYMPHOCYTES RELATIVE PERCENT: 25.2 %
MCH RBC QN AUTO: 29.9 PG (ref 27–31.3)
MCHC RBC AUTO-ENTMCNC: 32.9 % (ref 33–37)
MCV RBC AUTO: 91 FL (ref 82–100)
MONOCYTES ABSOLUTE: 0.7 K/UL (ref 0.2–0.8)
MONOCYTES RELATIVE PERCENT: 9.5 %
NEUTROPHILS ABSOLUTE: 4.6 K/UL (ref 1.4–6.5)
NEUTROPHILS RELATIVE PERCENT: 63 %
PDW BLD-RTO: 13.9 % (ref 11.5–14.5)
PLATELET # BLD: 289 K/UL (ref 130–400)
POTASSIUM SERPL-SCNC: 3.9 MEQ/L (ref 3.4–4.9)
RBC # BLD: 4.52 M/UL (ref 4.2–5.4)
SODIUM BLD-SCNC: 144 MEQ/L (ref 135–144)
TOTAL PROTEIN: 7.4 G/DL (ref 6.3–8)
TRIGL SERPL-MCNC: 152 MG/DL (ref 0–150)
WBC # BLD: 7.3 K/UL (ref 4.8–10.8)

## 2021-04-09 PROCEDURE — 85025 COMPLETE CBC W/AUTO DIFF WBC: CPT

## 2021-04-09 PROCEDURE — 83036 HEMOGLOBIN GLYCOSYLATED A1C: CPT

## 2021-04-09 PROCEDURE — 80061 LIPID PANEL: CPT

## 2021-04-09 PROCEDURE — 80053 COMPREHEN METABOLIC PANEL: CPT

## 2021-06-10 ENCOUNTER — APPOINTMENT (OUTPATIENT)
Dept: GENERAL RADIOLOGY | Age: 86
DRG: 313 | End: 2021-06-10
Payer: MEDICARE

## 2021-06-10 ENCOUNTER — HOSPITAL ENCOUNTER (INPATIENT)
Age: 86
LOS: 1 days | Discharge: HOME | DRG: 313 | End: 2021-06-12
Attending: STUDENT IN AN ORGANIZED HEALTH CARE EDUCATION/TRAINING PROGRAM | Admitting: INTERNAL MEDICINE
Payer: MEDICARE

## 2021-06-10 DIAGNOSIS — R07.9 CHEST PAIN, UNSPECIFIED TYPE: Primary | ICD-10-CM

## 2021-06-10 LAB
ALBUMIN SERPL-MCNC: 4.5 G/DL (ref 3.5–4.6)
ALP BLD-CCNC: 98 U/L (ref 40–130)
ALT SERPL-CCNC: 21 U/L (ref 0–33)
ANION GAP SERPL CALCULATED.3IONS-SCNC: 9 MEQ/L (ref 9–15)
AST SERPL-CCNC: 25 U/L (ref 0–35)
BASOPHILS ABSOLUTE: 0.1 K/UL (ref 0–0.2)
BASOPHILS RELATIVE PERCENT: 0.9 %
BILIRUB SERPL-MCNC: 0.3 MG/DL (ref 0.2–0.7)
BILIRUBIN URINE: NEGATIVE
BLOOD, URINE: NEGATIVE
BUN BLDV-MCNC: 13 MG/DL (ref 8–23)
CALCIUM SERPL-MCNC: 9.9 MG/DL (ref 8.5–9.9)
CHLORIDE BLD-SCNC: 102 MEQ/L (ref 95–107)
CLARITY: CLEAR
CO2: 31 MEQ/L (ref 20–31)
COLOR: YELLOW
CREAT SERPL-MCNC: 0.71 MG/DL (ref 0.5–0.9)
EOSINOPHILS ABSOLUTE: 0.2 K/UL (ref 0–0.7)
EOSINOPHILS RELATIVE PERCENT: 2.5 %
GFR AFRICAN AMERICAN: >60
GFR NON-AFRICAN AMERICAN: >60
GLOBULIN: 3.3 G/DL (ref 2.3–3.5)
GLUCOSE BLD-MCNC: 105 MG/DL (ref 70–99)
GLUCOSE URINE: NEGATIVE MG/DL
HCT VFR BLD CALC: 40.5 % (ref 37–47)
HEMOGLOBIN: 13.7 G/DL (ref 12–16)
KETONES, URINE: NEGATIVE MG/DL
LEUKOCYTE ESTERASE, URINE: ABNORMAL
LIPASE: 21 U/L (ref 12–95)
LYMPHOCYTES ABSOLUTE: 1.8 K/UL (ref 1–4.8)
LYMPHOCYTES RELATIVE PERCENT: 27.1 %
MAGNESIUM: 1.7 MG/DL (ref 1.7–2.4)
MCH RBC QN AUTO: 30.3 PG (ref 27–31.3)
MCHC RBC AUTO-ENTMCNC: 33.8 % (ref 33–37)
MCV RBC AUTO: 89.5 FL (ref 82–100)
MONOCYTES ABSOLUTE: 0.7 K/UL (ref 0.2–0.8)
MONOCYTES RELATIVE PERCENT: 10.8 %
NEUTROPHILS ABSOLUTE: 4 K/UL (ref 1.4–6.5)
NEUTROPHILS RELATIVE PERCENT: 58.7 %
NITRITE, URINE: NEGATIVE
PDW BLD-RTO: 13.3 % (ref 11.5–14.5)
PH UA: 6 (ref 5–9)
PLATELET # BLD: 266 K/UL (ref 130–400)
POTASSIUM SERPL-SCNC: 4.2 MEQ/L (ref 3.4–4.9)
PRO-BNP: 286 PG/ML
PROTEIN UA: NEGATIVE MG/DL
RBC # BLD: 4.53 M/UL (ref 4.2–5.4)
RBC UA: NORMAL /HPF (ref 0–2)
SODIUM BLD-SCNC: 142 MEQ/L (ref 135–144)
SPECIFIC GRAVITY UA: 1 (ref 1–1.03)
TOTAL PROTEIN: 7.8 G/DL (ref 6.3–8)
TROPONIN: <0.01 NG/ML (ref 0–0.01)
URINE REFLEX TO CULTURE: ABNORMAL
UROBILINOGEN, URINE: 0.2 E.U./DL
WBC # BLD: 6.8 K/UL (ref 4.8–10.8)
WBC UA: NORMAL /HPF (ref 0–5)

## 2021-06-10 PROCEDURE — 80053 COMPREHEN METABOLIC PANEL: CPT

## 2021-06-10 PROCEDURE — 85025 COMPLETE CBC W/AUTO DIFF WBC: CPT

## 2021-06-10 PROCEDURE — 71045 X-RAY EXAM CHEST 1 VIEW: CPT

## 2021-06-10 PROCEDURE — 81003 URINALYSIS AUTO W/O SCOPE: CPT

## 2021-06-10 PROCEDURE — 83735 ASSAY OF MAGNESIUM: CPT

## 2021-06-10 PROCEDURE — G0378 HOSPITAL OBSERVATION PER HR: HCPCS

## 2021-06-10 PROCEDURE — 6360000002 HC RX W HCPCS: Performed by: INTERNAL MEDICINE

## 2021-06-10 PROCEDURE — 6370000000 HC RX 637 (ALT 250 FOR IP): Performed by: INTERNAL MEDICINE

## 2021-06-10 PROCEDURE — 99285 EMERGENCY DEPT VISIT HI MDM: CPT

## 2021-06-10 PROCEDURE — 83880 ASSAY OF NATRIURETIC PEPTIDE: CPT

## 2021-06-10 PROCEDURE — 83690 ASSAY OF LIPASE: CPT

## 2021-06-10 PROCEDURE — 36415 COLL VENOUS BLD VENIPUNCTURE: CPT

## 2021-06-10 PROCEDURE — 84484 ASSAY OF TROPONIN QUANT: CPT

## 2021-06-10 PROCEDURE — 2580000003 HC RX 258: Performed by: INTERNAL MEDICINE

## 2021-06-10 PROCEDURE — 96372 THER/PROPH/DIAG INJ SC/IM: CPT

## 2021-06-10 PROCEDURE — 6370000000 HC RX 637 (ALT 250 FOR IP): Performed by: PHYSICIAN ASSISTANT

## 2021-06-10 PROCEDURE — 93005 ELECTROCARDIOGRAM TRACING: CPT | Performed by: STUDENT IN AN ORGANIZED HEALTH CARE EDUCATION/TRAINING PROGRAM

## 2021-06-10 RX ORDER — ACETAMINOPHEN 500 MG
1000 TABLET ORAL 2 TIMES DAILY
Status: DISCONTINUED | OUTPATIENT
Start: 2021-06-10 | End: 2021-06-12 | Stop reason: HOSPADM

## 2021-06-10 RX ORDER — ATORVASTATIN CALCIUM 40 MG/1
80 TABLET, FILM COATED ORAL NIGHTLY
Status: DISCONTINUED | OUTPATIENT
Start: 2021-06-10 | End: 2021-06-12 | Stop reason: HOSPADM

## 2021-06-10 RX ORDER — SODIUM CHLORIDE 0.9 % (FLUSH) 0.9 %
5-40 SYRINGE (ML) INJECTION EVERY 12 HOURS SCHEDULED
Status: DISCONTINUED | OUTPATIENT
Start: 2021-06-10 | End: 2021-06-12 | Stop reason: HOSPADM

## 2021-06-10 RX ORDER — ACETAMINOPHEN 325 MG/1
650 TABLET ORAL EVERY 6 HOURS PRN
Status: DISCONTINUED | OUTPATIENT
Start: 2021-06-10 | End: 2021-06-12 | Stop reason: HOSPADM

## 2021-06-10 RX ORDER — NITROGLYCERIN 0.4 MG/1
0.4 TABLET SUBLINGUAL EVERY 5 MIN PRN
Status: DISCONTINUED | OUTPATIENT
Start: 2021-06-10 | End: 2021-06-10

## 2021-06-10 RX ORDER — VALSARTAN 160 MG/1
320 TABLET ORAL DAILY
Status: DISCONTINUED | OUTPATIENT
Start: 2021-06-10 | End: 2021-06-12 | Stop reason: HOSPADM

## 2021-06-10 RX ORDER — POLYETHYLENE GLYCOL 3350 17 G/17G
17 POWDER, FOR SOLUTION ORAL DAILY PRN
Status: DISCONTINUED | OUTPATIENT
Start: 2021-06-10 | End: 2021-06-12 | Stop reason: HOSPADM

## 2021-06-10 RX ORDER — ACETAMINOPHEN 500 MG
1000 TABLET ORAL 2 TIMES DAILY
COMMUNITY

## 2021-06-10 RX ORDER — ATENOLOL 25 MG/1
25 TABLET ORAL DAILY
Status: DISCONTINUED | OUTPATIENT
Start: 2021-06-11 | End: 2021-06-11

## 2021-06-10 RX ORDER — ASPIRIN 81 MG/1
324 TABLET, CHEWABLE ORAL ONCE
Status: COMPLETED | OUTPATIENT
Start: 2021-06-10 | End: 2021-06-10

## 2021-06-10 RX ORDER — DIAZEPAM 5 MG/1
5 TABLET ORAL 2 TIMES DAILY
Status: DISCONTINUED | OUTPATIENT
Start: 2021-06-10 | End: 2021-06-12 | Stop reason: HOSPADM

## 2021-06-10 RX ORDER — ATORVASTATIN CALCIUM 20 MG/1
20 TABLET, FILM COATED ORAL NIGHTLY
Status: DISCONTINUED | OUTPATIENT
Start: 2021-06-10 | End: 2021-06-10

## 2021-06-10 RX ORDER — NITROGLYCERIN 0.4 MG/1
0.4 TABLET SUBLINGUAL EVERY 5 MIN PRN
Status: DISCONTINUED | OUTPATIENT
Start: 2021-06-10 | End: 2021-06-12 | Stop reason: HOSPADM

## 2021-06-10 RX ORDER — SODIUM CHLORIDE 9 MG/ML
25 INJECTION, SOLUTION INTRAVENOUS PRN
Status: DISCONTINUED | OUTPATIENT
Start: 2021-06-10 | End: 2021-06-12 | Stop reason: HOSPADM

## 2021-06-10 RX ORDER — SODIUM CHLORIDE 0.9 % (FLUSH) 0.9 %
5-40 SYRINGE (ML) INJECTION PRN
Status: DISCONTINUED | OUTPATIENT
Start: 2021-06-10 | End: 2021-06-12 | Stop reason: HOSPADM

## 2021-06-10 RX ORDER — ONDANSETRON 4 MG/1
4 TABLET, ORALLY DISINTEGRATING ORAL EVERY 8 HOURS PRN
Status: DISCONTINUED | OUTPATIENT
Start: 2021-06-10 | End: 2021-06-12 | Stop reason: HOSPADM

## 2021-06-10 RX ORDER — ONDANSETRON 2 MG/ML
4 INJECTION INTRAMUSCULAR; INTRAVENOUS EVERY 6 HOURS PRN
Status: DISCONTINUED | OUTPATIENT
Start: 2021-06-10 | End: 2021-06-12 | Stop reason: HOSPADM

## 2021-06-10 RX ORDER — ACETAMINOPHEN 650 MG/1
650 SUPPOSITORY RECTAL EVERY 6 HOURS PRN
Status: DISCONTINUED | OUTPATIENT
Start: 2021-06-10 | End: 2021-06-12 | Stop reason: HOSPADM

## 2021-06-10 RX ORDER — ASPIRIN 81 MG/1
81 TABLET, CHEWABLE ORAL DAILY
Status: DISCONTINUED | OUTPATIENT
Start: 2021-06-11 | End: 2021-06-12 | Stop reason: HOSPADM

## 2021-06-10 RX ADMIN — NITROGLYCERIN 0.4 MG: 0.4 TABLET, ORALLY DISINTEGRATING SUBLINGUAL at 15:49

## 2021-06-10 RX ADMIN — ACETAMINOPHEN 1000 MG: 500 TABLET ORAL at 21:06

## 2021-06-10 RX ADMIN — DIAZEPAM 5 MG: 5 TABLET ORAL at 21:07

## 2021-06-10 RX ADMIN — ENOXAPARIN SODIUM 40 MG: 40 INJECTION SUBCUTANEOUS at 21:07

## 2021-06-10 RX ADMIN — SODIUM CHLORIDE, PRESERVATIVE FREE 10 ML: 5 INJECTION INTRAVENOUS at 21:07

## 2021-06-10 RX ADMIN — ATORVASTATIN CALCIUM 80 MG: 40 TABLET, FILM COATED ORAL at 21:07

## 2021-06-10 RX ADMIN — ASPIRIN 324 MG: 81 TABLET, CHEWABLE ORAL at 15:49

## 2021-06-10 ASSESSMENT — PAIN SCALES - GENERAL
PAINLEVEL_OUTOF10: 0
PAINLEVEL_OUTOF10: 5
PAINLEVEL_OUTOF10: 0

## 2021-06-10 ASSESSMENT — PAIN DESCRIPTION - PAIN TYPE
TYPE: ACUTE PAIN
TYPE: ACUTE PAIN
TYPE: CHRONIC PAIN

## 2021-06-10 ASSESSMENT — ENCOUNTER SYMPTOMS
ABDOMINAL DISTENTION: 0
ANAL BLEEDING: 0
PHOTOPHOBIA: 0
EYE DISCHARGE: 0
VOICE CHANGE: 0
APNEA: 0

## 2021-06-10 ASSESSMENT — PAIN DESCRIPTION - PROGRESSION: CLINICAL_PROGRESSION: NOT CHANGED

## 2021-06-10 ASSESSMENT — PAIN DESCRIPTION - DESCRIPTORS: DESCRIPTORS: ACHING

## 2021-06-10 ASSESSMENT — PAIN DESCRIPTION - FREQUENCY
FREQUENCY: INTERMITTENT
FREQUENCY: INTERMITTENT
FREQUENCY: CONTINUOUS

## 2021-06-10 ASSESSMENT — PAIN DESCRIPTION - LOCATION
LOCATION: CHEST
LOCATION: CHEST
LOCATION: KNEE

## 2021-06-10 ASSESSMENT — PAIN DESCRIPTION - ONSET: ONSET: ON-GOING

## 2021-06-10 NOTE — ACP (ADVANCE CARE PLANNING)
10    Conversation Outcomes:  [x] ACP discussion completed  [] Existing advance directive reviewed with patient; no changes to patient's previously recorded wishes  [] New Advance Directive completed  [] Portable Do Not Rescitate prepared for Provider review and signature  [] POLST/POST/MOLST/MOST prepared for Provider review and signature      Follow-up plan:    [] Schedule follow-up conversation to continue planning  [] Referred individual to Provider for additional questions/concerns   [] Advised patient/agent/surrogate to review completed ACP document and update if needed with changes in condition, patient preferences or care setting    [x] This note routed to one or more involved healthcare providers

## 2021-06-10 NOTE — ED PROVIDER NOTES
[address] [facility] ED  eMERGENCY dEPARTMENT eNCOUnter      Pt Name: Mehran Dela Cruz  MRN: [id number]  [place] 1935  Date of evaluation: 6/10/2021  Provider: Britney Eubanks PA-C    CHIEF COMPLAINT       Chief Complaint   Patient presents with    Chest Pain     CP that started last night that is becoming worse today          HISTORY OF PRESENT ILLNESS   (Location/Symptom, Timing/Onset,Context/Setting, Quality, Duration, Modifying Factors, Severity)  Note limiting factors. Mehran Dela Cruz is a 82 y.o. female who presents to the emergency department patient had chest pain that started 2 hours ago. And has become worse today it is intermittent she does have history of CABG in 18 years ago. She notes that last night she had all over pain and some abdominal bloating. She is very specific that she has central to left-sided chest pain today it is intermittent. Symptoms are mild to moderate severity nothing improves or worsens her symptoms she denies fever nausea vomiting denies hematemesis rectal bleeding pain burning for urination. HPI    NursingNotes were reviewed. REVIEW OF SYSTEMS    (2-9 systems for level 4, 10 or more for level 5)     Review of Systems   Constitutional: Negative for activity change, appetite change and unexpected weight change. HENT: Negative for ear discharge, nosebleeds and voice change. Eyes: Negative for photophobia and discharge. Respiratory: Negative for apnea. Cardiovascular: Positive for chest pain and leg swelling. Gastrointestinal: Negative for abdominal distention and anal bleeding. Endocrine: Negative for cold intolerance, heat intolerance and polyphagia. Genitourinary: Negative for genital sores. Musculoskeletal: Negative for joint swelling. Skin: Negative for pallor. Allergic/Immunologic: Negative for immunocompromised state. Neurological: Negative for seizures and facial asymmetry. Hematological: Does not bruise/bleed easily. Determinants of Health     Financial Resource Strain:     Difficulty of Paying Living Expenses:    Food Insecurity:     Worried About Running Out of Food in the Last Year:     [address] in the Last Year:    Transportation Needs:     Lack of Transportation (Medical):  Lack of Transportation (Non-Medical):    Physical Activity:     Days of Exercise per Week:     Minutes of Exercise per Session:    Stress:     Feeling of Stress :    Social Connections:     Frequency of Communication with Friends and Family:     Frequency of Social Gatherings with Friends and Family:     Attends Faith Services:     Active Member of Clubs or Organizations:     Attends Club or Organization Meetings:     Marital Status:    Intimate Partner Violence:     Fear of Current or Ex-Partner:     Emotionally Abused:     Physically Abused:     Sexually Abused:        SCREENINGS    [place] Coma Scale  Eye Opening: Spontaneous  Best Verbal Response: Oriented  Best Motor Response: Obeys commands  Roberto Coma Scale Score: 15 @FLOW([id number])@      PHYSICAL EXAM    (up to 7 for level 4, 8 or more for level 5)     ED Triage Vitals [06/10/21 1527]   BP Temp Temp Source Pulse Resp SpO2 Height Weight   (!) 206/80 98.5 °F (36.9 °C) Oral 80 20 94 % 5' 1.5\" (1.562 m) 162 lb (73.5 kg)       Physical Exam  Vitals and nursing note reviewed. Constitutional:       General: She is not in acute distress. Appearance: She is well-developed. HENT:      Head: Normocephalic and atraumatic. Right Ear: Tympanic membrane normal.      Left Ear: Tympanic membrane normal.      Nose: Nose normal.      Mouth/Throat:      Mouth: Mucous membranes are moist.      Pharynx: No oropharyngeal exudate or posterior oropharyngeal erythema. Eyes:      General:         Right eye: No discharge. Left eye: No discharge. Extraocular Movements: Extraocular movements intact. Pupils: Pupils are equal, round, and reactive to light. SMALL (*)     All other components within normal limits   CBC WITH AUTO DIFFERENTIAL   MAGNESIUM   LIPASE   TROPONIN   BRAIN NATRIURETIC PEPTIDE   MICROSCOPIC URINALYSIS       All other labs were within normal range or not returned as of this dictation. EMERGENCY DEPARTMENT COURSE and DIFFERENTIAL DIAGNOSIS/MDM:   Vitals:    Vitals:    06/10/21 1527 06/10/21 1652 06/10/21 1711   BP: (!) 206/80 (!) 176/78 (!) 157/69   Pulse: 80 75 75   Resp: 20 15 18   Temp: 98.5 °F (36.9 °C)     TempSrc: Oral     SpO2: 94% 95% 96%   Weight: 162 lb (73.5 kg)     Height: 5' 1.5\" (1.562 m)              MDM  Number of Diagnoses or Management Options  Chest pain, unspecified type  Diagnosis management comments: With Dr. Hubert Grady he notes patient seen Dr. Jocelyn Almazan for cardiology with discussed with Dr. Conchita Howe will admit patient patient feels better after nitroglycerin. Will type admit orders. Amount and/or Complexity of Data Reviewed  Clinical lab tests: reviewed and ordered  Tests in the radiology section of CPT®: reviewed and ordered  Discuss the patient with other providers: yes        CRITICAL CARE TIME       CONSULTS:  None    PROCEDURES:  Unless otherwise noted below, none     Procedures    FINAL IMPRESSION      1. Chest pain, unspecified type          DISPOSITION/PLAN   DISPOSITION Decision To Admit 06/10/2021 05:06:47 PM      PATIENT REFERRED TO:  No follow-up provider specified.     DISCHARGE MEDICATIONS:  New Prescriptions    No medications on file          (Please note that portions of this note were completed with a voice recognition program.  Efforts were made to edit the dictations but occasionally words are mis-transcribed.)    Eran Nichole PA-C (electronically signed)  Attending Emergency Physician        Eran Nichole PA-C  06/16/21 0159

## 2021-06-10 NOTE — ED NOTES
Report called to the nurse on [facility] 22, [address] [facility]  06/10/21 8636

## 2021-06-11 ENCOUNTER — APPOINTMENT (OUTPATIENT)
Dept: NUCLEAR MEDICINE | Age: 86
DRG: 313 | End: 2021-06-11
Payer: MEDICARE

## 2021-06-11 LAB
CHOLESTEROL, TOTAL: 137 MG/DL (ref 0–199)
EKG ATRIAL RATE: 79 BPM
EKG ATRIAL RATE: 84 BPM
EKG P AXIS: 55 DEGREES
EKG P AXIS: 9 DEGREES
EKG P-R INTERVAL: 132 MS
EKG P-R INTERVAL: 166 MS
EKG Q-T INTERVAL: 422 MS
EKG Q-T INTERVAL: 432 MS
EKG QRS DURATION: 140 MS
EKG QRS DURATION: 144 MS
EKG QTC CALCULATION (BAZETT): 483 MS
EKG QTC CALCULATION (BAZETT): 510 MS
EKG R AXIS: 52 DEGREES
EKG R AXIS: 64 DEGREES
EKG T AXIS: 20 DEGREES
EKG T AXIS: 30 DEGREES
EKG VENTRICULAR RATE: 79 BPM
EKG VENTRICULAR RATE: 84 BPM
HCT VFR BLD CALC: 39.7 % (ref 37–47)
HDLC SERPL-MCNC: 48 MG/DL (ref 40–59)
HEMOGLOBIN: 13.4 G/DL (ref 12–16)
LDL CHOLESTEROL CALCULATED: 51 MG/DL (ref 0–129)
LV EF: 80 %
LVEF MODALITY: NORMAL
MAGNESIUM: 1.7 MG/DL (ref 1.7–2.4)
MCH RBC QN AUTO: 30.5 PG (ref 27–31.3)
MCHC RBC AUTO-ENTMCNC: 33.9 % (ref 33–37)
MCV RBC AUTO: 90.1 FL (ref 82–100)
PDW BLD-RTO: 13.2 % (ref 11.5–14.5)
PLATELET # BLD: 253 K/UL (ref 130–400)
RBC # BLD: 4.41 M/UL (ref 4.2–5.4)
TRIGL SERPL-MCNC: 191 MG/DL (ref 0–150)
WBC # BLD: 6 K/UL (ref 4.8–10.8)

## 2021-06-11 PROCEDURE — 93010 ELECTROCARDIOGRAM REPORT: CPT | Performed by: INTERNAL MEDICINE

## 2021-06-11 PROCEDURE — 93018 CV STRESS TEST I&R ONLY: CPT | Performed by: INTERNAL MEDICINE

## 2021-06-11 PROCEDURE — 6360000002 HC RX W HCPCS: Performed by: INTERNAL MEDICINE

## 2021-06-11 PROCEDURE — 36415 COLL VENOUS BLD VENIPUNCTURE: CPT

## 2021-06-11 PROCEDURE — 99205 OFFICE O/P NEW HI 60 MIN: CPT | Performed by: INTERNAL MEDICINE

## 2021-06-11 PROCEDURE — 96372 THER/PROPH/DIAG INJ SC/IM: CPT

## 2021-06-11 PROCEDURE — 6370000000 HC RX 637 (ALT 250 FOR IP): Performed by: INTERNAL MEDICINE

## 2021-06-11 PROCEDURE — A9502 TC99M TETROFOSMIN: HCPCS | Performed by: INTERNAL MEDICINE

## 2021-06-11 PROCEDURE — G0378 HOSPITAL OBSERVATION PER HR: HCPCS

## 2021-06-11 PROCEDURE — 83735 ASSAY OF MAGNESIUM: CPT

## 2021-06-11 PROCEDURE — 6370000000 HC RX 637 (ALT 250 FOR IP): Performed by: PHYSICIAN ASSISTANT

## 2021-06-11 PROCEDURE — 3430000000 HC RX DIAGNOSTIC RADIOPHARMACEUTICAL: Performed by: INTERNAL MEDICINE

## 2021-06-11 PROCEDURE — 2580000003 HC RX 258: Performed by: INTERNAL MEDICINE

## 2021-06-11 PROCEDURE — 78452 HT MUSCLE IMAGE SPECT MULT: CPT

## 2021-06-11 PROCEDURE — 85027 COMPLETE CBC AUTOMATED: CPT

## 2021-06-11 PROCEDURE — 80061 LIPID PANEL: CPT

## 2021-06-11 PROCEDURE — 93017 CV STRESS TEST TRACING ONLY: CPT

## 2021-06-11 RX ORDER — HYDROCHLOROTHIAZIDE 12.5 MG/1
12.5 TABLET ORAL DAILY
Status: DISCONTINUED | OUTPATIENT
Start: 2021-06-11 | End: 2021-06-12 | Stop reason: HOSPADM

## 2021-06-11 RX ORDER — DIAPER,BRIEF,INFANT-TODD,DISP
EACH MISCELLANEOUS 2 TIMES DAILY
Status: DISCONTINUED | OUTPATIENT
Start: 2021-06-11 | End: 2021-06-12 | Stop reason: HOSPADM

## 2021-06-11 RX ORDER — ATENOLOL 25 MG/1
25 TABLET ORAL 2 TIMES DAILY
Status: DISCONTINUED | OUTPATIENT
Start: 2021-06-11 | End: 2021-06-12 | Stop reason: HOSPADM

## 2021-06-11 RX ORDER — SODIUM CHLORIDE 0.9 % (FLUSH) 0.9 %
10 SYRINGE (ML) INJECTION PRN
Status: DISCONTINUED | OUTPATIENT
Start: 2021-06-11 | End: 2021-06-12 | Stop reason: HOSPADM

## 2021-06-11 RX ADMIN — SODIUM CHLORIDE, PRESERVATIVE FREE 10 ML: 5 INJECTION INTRAVENOUS at 08:27

## 2021-06-11 RX ADMIN — ATENOLOL 25 MG: 25 TABLET ORAL at 20:28

## 2021-06-11 RX ADMIN — DIAZEPAM 5 MG: 5 TABLET ORAL at 08:25

## 2021-06-11 RX ADMIN — VALSARTAN 320 MG: 160 TABLET, FILM COATED ORAL at 08:25

## 2021-06-11 RX ADMIN — ASPIRIN 81 MG: 81 TABLET, CHEWABLE ORAL at 08:25

## 2021-06-11 RX ADMIN — ENOXAPARIN SODIUM 40 MG: 40 INJECTION SUBCUTANEOUS at 08:26

## 2021-06-11 RX ADMIN — SODIUM CHLORIDE, PRESERVATIVE FREE 10 ML: 5 INJECTION INTRAVENOUS at 10:38

## 2021-06-11 RX ADMIN — SODIUM CHLORIDE, PRESERVATIVE FREE 10 ML: 5 INJECTION INTRAVENOUS at 20:32

## 2021-06-11 RX ADMIN — ACETAMINOPHEN 1000 MG: 500 TABLET ORAL at 08:25

## 2021-06-11 RX ADMIN — ATORVASTATIN CALCIUM 80 MG: 40 TABLET, FILM COATED ORAL at 20:28

## 2021-06-11 RX ADMIN — HYDROCHLOROTHIAZIDE 12.5 MG: 12.5 TABLET ORAL at 13:54

## 2021-06-11 RX ADMIN — SODIUM CHLORIDE, PRESERVATIVE FREE 10 ML: 5 INJECTION INTRAVENOUS at 12:26

## 2021-06-11 RX ADMIN — DIAZEPAM 5 MG: 5 TABLET ORAL at 20:28

## 2021-06-11 RX ADMIN — TETROFOSMIN 10 MILLICURIE: 1.38 INJECTION, POWDER, LYOPHILIZED, FOR SOLUTION INTRAVENOUS at 10:34

## 2021-06-11 RX ADMIN — TETROFOSMIN 32 MILLICURIE: 1.38 INJECTION, POWDER, LYOPHILIZED, FOR SOLUTION INTRAVENOUS at 12:00

## 2021-06-11 ASSESSMENT — PAIN SCALES - GENERAL
PAINLEVEL_OUTOF10: 6
PAINLEVEL_OUTOF10: 0

## 2021-06-11 ASSESSMENT — ENCOUNTER SYMPTOMS
COLOR CHANGE: 0
VOICE CHANGE: 0
CHEST TIGHTNESS: 0
NAUSEA: 0
ABDOMINAL DISTENTION: 0
SHORTNESS OF BREATH: 0
ANAL BLEEDING: 0
BLOOD IN STOOL: 0
VOMITING: 0
TROUBLE SWALLOWING: 0
WHEEZING: 0
APNEA: 0
FACIAL SWELLING: 0

## 2021-06-11 NOTE — PROGRESS NOTES
Spiritual Care Services     Summary of Visit:  Pt has known God her whole life, feels she is well cared for and supported, grateful for that healing presence today. Received story, pt engaged in life review, provided prayer, reframed part of her story to reflect meaning she named. Spiritual Assessment/Intervention/Outcomes:    Encounter Summary  Services provided to[de-identified] Patient  Referral/Consult From[de-identified] [facility]  Support System: Children, Spouse  Continue Visiting: Yes  Volunteer Visit: Yes  Complexity of Encounter: Low  Length of Encounter: 15 minutes  Spiritual Assessment Completed: Yes  Routine  Type: Initial     Spiritual/U[place]  Type: Spiritual support  Assessment: Calm, Approachable, Hopeful  Intervention: Prayer, Sustaining presence/ Ministry of presence, Active listening, Discussed meaning/purpose  Outcome: Expressed feelings of antony, peace, and/or awe                         Care Plan:    Open to ongoing spiritual support. Spiritual Care Services   Electronically signed by Ashwin Rubin on 6/11/21 at 11:01 AM EDT     To reach a  for emotional and spiritual support, place an [facility] consult request.   If a  is needed immediately, dial 0 and ask to page the on-call .

## 2021-06-11 NOTE — H&P
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  He has seen by Dr. Jazmin Silva once. Prior to that he was seen by Dr. Maru Vann. 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 She does not smoke. Compliant with medication. She usually walks on a treadmill 3 times a week. Cardiac markers are negative.     Allergies   Allergen Reactions    Carbapenems     Cephalosporins     Hydralazine Other (See Comments)    Pcn [Penicillins]        Current Facility-Administered Medications   Medication Dose Route Frequency Provider Last Rate Last Admin    hydrocortisone 1 % cream   Topical BID Yani Faustin MD        sodium chloride flush 0.9 % injection 5-40 mL  5-40 mL Intravenous 2 times per day Yani Faustin MD   10 mL at 06/11/21 0827    sodium chloride flush 0.9 % injection 5-40 mL  5-40 mL Intravenous PRN Yani Faustin MD        0.9 % sodium chloride infusion  25 mL Intravenous PRN Yani Faustin MD        ondansetron (ZOFRAN-ODT) disintegrating tablet 4 mg  4 mg Oral Q8H PRN Yani Faustin MD        Or    ondansetron [facility]) injection 4 mg  4 mg Intravenous Q6H PRN Yani Faustin MD        acetaminophen (TYLENOL) tablet 650 mg  650 mg Oral Q6H PRN Yani Faustin MD        Or   [place] acetaminophen (TYLENOL) suppository 650 mg  650 mg Rectal Q6H PRN Yani Faustin MD        polyethylene glycol [facility]) packet 17 g  17 g Oral Daily PRN Yani Faustin MD        aspirin chewable tablet 81 mg  81 mg Oral Daily Yani Faustin MD   81 mg at 06/11/21 0825    enoxaparin (LOVENOX) injection 40 mg  40 mg Subcutaneous Daily Linda Cardona MD   40 mg at 06/11/21 0826    atorvastatin (LIPITOR) tablet 80 mg  80 mg Oral Nightly Linda Cardona MD   80 mg at 06/10/21 2107    nitroGLYCERIN (NITROSTAT) SL tablet 0.4 mg  0.4 mg Sublingual Q5 Min PRN Linda Cardona MD        acetaminophen (TYLENOL) tablet 1,000 mg  1,000 mg Oral BID Linda Cardona MD   1,000 mg at 06/11/21 0825    atenolol (TENORMIN) tablet 25 mg  25 mg Oral Daily Linda Cardona MD        diazePAM (VALIUM) tablet 5 mg  5 mg Oral BID Linda Cardona MD   5 mg at 06/11/21 0825    valsartan (DIOVAN) tablet 320 mg  320 mg Oral Daily Linda Cardona MD   320 mg at 06/11/21 0825       PMHx:  Past Medical History:   Diagnosis Date    Anxiety     [facility]     Coronary artery disease involving native coronary artery of native heart without angina pectoris 1/8/2018    HTN (hypertension)        PSHx:  Past Surgical History:   Procedure Laterality Date    CORONARY ARTERY BYPASS GRAFT         Social Hx:  Social History     Socioeconomic History    Marital status:      Spouse name: None    Number of children: None    Years of education: None    Highest education level: None   Occupational History    None   Tobacco Use    Smoking status: Never Smoker    Smokeless tobacco: Never Used   Vaping Use    Vaping Use: Never used   Substance and Sexual Activity    Alcohol use: No    Drug use: No    Sexual activity: None   Other Topics Concern    None   Social History Narrative    None     Social Determinants of Health     Financial Resource Strain:     Difficulty of Paying Living Expenses:    Food Insecurity:     Worried About Running Out of Food in the Last Year:     Ran Out of Food in the Last Year:    Transportation Needs:     Lack of Transportation (Medical):      Lack of Transportation (Non-Medical):    Physical Activity:     Days of Exercise per Week:     Minutes of Exercise per Session:    Stress:     Feeling of Stress :    Social Connections:  Frequency of Communication with Friends and Family:     Frequency of Social Gatherings with Friends and Family:     Attends Religion Services:     Active Member of Clubs or Organizations:     Attends Club or Organization Meetings:     Marital Status:    Intimate Partner Violence:     Fear of Current or Ex-Partner:     Emotionally Abused:     Physically Abused:     Sexually Abused:        Family Hx:  History reviewed. No pertinent family history. Review ofSystems:   Review of Systems   Constitutional: Negative for activity change, appetite change, diaphoresis, fatigue and unexpected weight change. HENT: Negative for facial swelling, nosebleeds, trouble swallowing and voice change. Respiratory: Negative for apnea, chest tightness, shortness of breath and wheezing. Cardiovascular: Negative for chest pain, palpitations and leg swelling. Gastrointestinal: Negative for abdominal distention, anal bleeding, blood in stool, nausea and vomiting. Genitourinary: Negative for decreased urine volume and dysuria. Musculoskeletal: Negative for gait problem and myalgias. Skin: Negative for color change, pallor, rash and wound. Neurological: Negative for dizziness, syncope, facial asymmetry, weakness, light-headedness, numbness and headaches. Hematological: Does not bruise/bleed easily. Psychiatric/Behavioral: Negative for agitation, behavioral problems, confusion, hallucinations and suicidal ideas. The patient is not nervous/anxious. All other systems reviewed and are negative. Physical Examination:    BP (!) 196/77   Pulse 83   Temp 97.9 °F (36.6 °C) (Oral)   Resp 17   Ht 5' 1.5\" (1.562 m)   Wt 162 lb (73.5 kg)   SpO2 98%   BMI 30.11 kg/m²    Physical Exam  Constitutional:       Appearance: She is well-developed. HENT:      Head: Normocephalic and atraumatic.       Right Ear: External ear normal.      Left Ear: External ear normal.   Eyes:      Conjunctiva/sclera: Conjunctivae normal.      Pupils: Pupils are equal, round, and reactive to light. Cardiovascular:      Rate and Rhythm: Normal rate and regular rhythm. Heart sounds: Normal heart sounds. Pulmonary:      Effort: Pulmonary effort is normal.      Breath sounds: Normal breath sounds. Abdominal:      General: Bowel sounds are normal.      Palpations: Abdomen is soft. Musculoskeletal:         General: Normal range of motion. Cervical back: Normal range of motion and neck supple. Skin:     General: Skin is warm and dry. Neurological:      Mental Status: She is alert and oriented to person, place, and time. Deep Tendon Reflexes: Reflexes are normal and symmetric. Psychiatric:         Behavior: Behavior normal.         Thought Content:  Thought content normal.         Judgment: Judgment normal.          LABS:  CBC:   Lab Results   Component Value Date    WBC 6.0 06/11/2021    RBC 4.41 06/11/2021    HGB 13.4 06/11/2021    HCT 39.7 06/11/2021    MCV 90.1 06/11/2021    MCH 30.5 06/11/2021    MCHC 33.9 06/11/2021    RDW 13.2 06/11/2021     06/11/2021    MPV 8.7 09/17/2014     CBC with Differential:   Lab Results   Component Value Date    WBC 6.0 06/11/2021    RBC 4.41 06/11/2021    HGB 13.4 06/11/2021    HCT 39.7 06/11/2021     06/11/2021    MCV 90.1 06/11/2021    MCH 30.5 06/11/2021    MCHC 33.9 06/11/2021    RDW 13.2 06/11/2021    LYMPHOPCT 27.1 06/10/2021    MONOPCT 10.8 06/10/2021    BASOPCT 0.9 06/10/2021    MONOSABS 0.7 06/10/2021    LYMPHSABS 1.8 06/10/2021    EOSABS 0.2 06/10/2021    BASOSABS 0.1 06/10/2021     CMP:    Lab Results   Component Value Date     06/10/2021    K 4.2 06/10/2021    K 4.3 07/31/2020     06/10/2021    CO2 31 06/10/2021    BUN 13 06/10/2021    CREATININE 0.71 06/10/2021    GFRAA >60.0 06/10/2021    LABGLOM >60.0 06/10/2021    GLUCOSE 105 06/10/2021    PROT 7.8 06/10/2021    LABALBU 4.5 06/10/2021    CALCIUM 9.9 06/10/2021    BILITOT 0.3 06/10/2021    ALKPHOS 98 06/10/2021    AST 25 06/10/2021    ALT 21 06/10/2021     BMP:    Lab Results   Component Value Date     06/10/2021    K 4.2 06/10/2021    K 4.3 07/31/2020     06/10/2021    CO2 31 06/10/2021    BUN 13 06/10/2021    LABALBU 4.5 06/10/2021    CREATININE 0.71 06/10/2021    CALCIUM 9.9 06/10/2021    GFRAA >60.0 06/10/2021    LABGLOM >60.0 06/10/2021    GLUCOSE 105 06/10/2021     Magnesium:    Lab Results   Component Value Date    MG 1.7 06/11/2021     Troponin:    Lab Results   Component Value Date    TROPONINI <0.010 06/10/2021     Recent Labs     06/10/21  1545   PROBNP 286     No results for input(s): INR in the last 72 hours. RADIOLOGY:  XR CHEST PORTABLE    Result Date: 6/10/2021  XR CHEST PORTABLE : 6/10/2021 CLINICAL HISTORY:  chest pain . COMPARISON: 7/31/2020. TECHNIQUE: A portable upright AP radiograph of the chest was obtained. FINDINGS: Shallow inspiratory volumes are present without significant infiltrate identified. There is no cardiomegaly, significant pleural effusion, vascular congestion, pneumothorax, or displaced fractures identified. 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 NO EVIDENCE OF ACTIVE CARDIOPULMONARY DISEASE, BY PORTABLE CHEST RADIOGRAPHY. EKG:  Assessment:    Active Hospital Problems    Diagnosis Date Noted    Chest pain [R07.9] 06/10/2021     1. 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 Cardiac markers are negative. Resume home medications.   Cheng Duggan today        Electronically signed by Mady Lozoya MD on 6/11/2021 at 9:47 AM

## 2021-06-11 NOTE — PROGRESS NOTES
Hx,allergies and medications reviewed. [place] [address] [address] here. Injected patient with isotope and Myoview. Tolerated procedure well. Reached 98 % target HR. SOB reported. Returned to baseline in recovery. Denied chest pain or pressure. EKG shows no noted ectopy.

## 2021-06-12 VITALS
SYSTOLIC BLOOD PRESSURE: 174 MMHG | BODY MASS INDEX: 30 KG/M2 | HEART RATE: 75 BPM | WEIGHT: 163 LBS | DIASTOLIC BLOOD PRESSURE: 74 MMHG | OXYGEN SATURATION: 99 % | TEMPERATURE: 98.2 F | RESPIRATION RATE: 18 BRPM | HEIGHT: 62 IN

## 2021-06-12 PROBLEM — I20.89 ANGINA AT REST: Status: ACTIVE | Noted: 2021-06-12

## 2021-06-12 PROBLEM — I20.8 ANGINA AT REST (HCC): Status: ACTIVE | Noted: 2021-06-12

## 2021-06-12 PROCEDURE — 2060000000 HC ICU INTERMEDIATE R&B

## 2021-06-12 PROCEDURE — 6370000000 HC RX 637 (ALT 250 FOR IP): Performed by: PHYSICIAN ASSISTANT

## 2021-06-12 PROCEDURE — 6360000002 HC RX W HCPCS: Performed by: INTERNAL MEDICINE

## 2021-06-12 PROCEDURE — 96372 THER/PROPH/DIAG INJ SC/IM: CPT

## 2021-06-12 PROCEDURE — 2580000003 HC RX 258: Performed by: INTERNAL MEDICINE

## 2021-06-12 PROCEDURE — 99238 HOSP IP/OBS DSCHRG MGMT 30/<: CPT | Performed by: INTERNAL MEDICINE

## 2021-06-12 PROCEDURE — 6370000000 HC RX 637 (ALT 250 FOR IP): Performed by: INTERNAL MEDICINE

## 2021-06-12 RX ORDER — CARVEDILOL 6.25 MG/1
6.25 TABLET ORAL 2 TIMES DAILY
Qty: 60 TABLET | Refills: 6 | Status: SHIPPED | OUTPATIENT
Start: 2021-06-12 | End: 2021-09-07 | Stop reason: SDUPTHER

## 2021-06-12 RX ADMIN — DIAZEPAM 5 MG: 5 TABLET ORAL at 08:10

## 2021-06-12 RX ADMIN — SODIUM CHLORIDE, PRESERVATIVE FREE 10 ML: 5 INJECTION INTRAVENOUS at 08:11

## 2021-06-12 RX ADMIN — ASPIRIN 81 MG: 81 TABLET, CHEWABLE ORAL at 08:10

## 2021-06-12 RX ADMIN — ENOXAPARIN SODIUM 40 MG: 40 INJECTION SUBCUTANEOUS at 08:10

## 2021-06-12 RX ADMIN — ATENOLOL 25 MG: 25 TABLET ORAL at 08:10

## 2021-06-12 RX ADMIN — ACETAMINOPHEN 1000 MG: 500 TABLET ORAL at 08:10

## 2021-06-12 RX ADMIN — HYDROCORTISONE ACETATE: 1 CREAM TOPICAL at 08:11

## 2021-06-12 RX ADMIN — HYDROCHLOROTHIAZIDE 12.5 MG: 12.5 TABLET ORAL at 08:10

## 2021-06-12 RX ADMIN — VALSARTAN 320 MG: 160 TABLET, FILM COATED ORAL at 08:10

## 2021-06-12 ASSESSMENT — PAIN SCALES - GENERAL
PAINLEVEL_OUTOF10: 0

## 2021-06-12 NOTE — FLOWSHEET NOTE
Pt.given d/c instructions- verbalized understanding. Pt.had no further questions. Waiting for d/c ride.

## 2021-06-12 NOTE — DISCHARGE INSTR - DIET

## 2021-06-12 NOTE — DISCHARGE SUMMARY
Discharge summary    Gabriel Dodson  Unit/Bed:W163/W163-01  Date of Birth: 1935  LEB: [id number]  Acct: [id number]   Admitting Diagnosis: Chest pain [R07.9]  Admit Date:  6/10/2021  Hospital Day: 0        Chief Complaint:   Chest pain     History of Present Illness:  22-year-old female who sunrays the patient 1 is admitted through the ER with complaint of chest discomfort across the chest. [facility] has seen by Dr. Christen Chinchilla once. Mayra Pineda to that he was seen by Dr. 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  She does not smoke.  Compliant with medication.  She usually walks on a treadmill 3 times a week.  Cardiac markers are negative.     6-12-21: doing well. No CP or SOB.  S/p negative stress test.                       Allergies   Allergen Reactions    Carbapenems      Cephalosporins      Hydralazine Other (See Comments)    Pcn [Penicillins]               Current Facility-Administered Medications                      Current Facility-Administered Medications   Medication Dose Route Frequency Provider Last Rate Last Admin    hydrocortisone 1 % cream   Topical BID Sujey Funes MD        sodium chloride flush 0.9 % injection 5-40 mL  5-40 mL Intravenous 2 times per day Sujey Funes MD   10 mL at 06/11/21 0827    sodium chloride flush 0.9 % injection 5-40 mL  5-40 mL Intravenous PRN Sujey Funes MD        0.9 % sodium chloride infusion  25 mL Intravenous PRN Sujey Funes MD        ondansetron (ZOFRAN-ODT) disintegrating tablet 4 mg  4 mg Oral Q8H PRN Sujey Funes MD         Or    ondansetron (ZOFRAN) injection 4 mg  4 mg Intravenous Q6H PRN Sujey Funes MD        acetaminophen (TYLENOL) tablet 650 mg  650 mg Oral Q6H PRN Sujey Funes MD         Or    acetaminophen (TYLENOL) suppository 650 mg  650 mg Rectal Q6H PRN Sujey Funes MD        polyethylene glycol (GLYCOLAX) packet 17 g  17 g Oral Daily PRN Sujey Funes MD     of Food in the Last Year:    Transportation Needs:     Lack of Transportation (Medical):  Lack of Transportation (Non-Medical):    Physical Activity:     Days of Exercise per Week:     Minutes of Exercise per Session:    Stress:     Feeling of Stress :    Social Connections:     Frequency of Communication with Friends and Family:     Frequency of Social Gatherings with Friends and Family:     Attends Taoism Services:     Active Member of Clubs or Organizations:     Attends Club or Organization Meetings:     Marital Status:    Intimate Partner Violence:     Fear of Current or Ex-Partner:     Emotionally Abused:     Physically Abused:     Sexually Abused:             Family Hx:  Family History   History reviewed. No pertinent family history.         Review ofSystems:   Review of Systems   Constitutional: Negative for activity change, appetite change, diaphoresis, fatigue and unexpected weight change. HENT: Negative for facial swelling, nosebleeds, trouble swallowing and voice change.    Respiratory: Negative for apnea, chest tightness, shortness of breath and wheezing.    Cardiovascular: Negative for chest pain, palpitations and leg swelling. Gastrointestinal: Negative for abdominal distention, anal bleeding, blood in stool, nausea and vomiting. Genitourinary: Negative for decreased urine volume and dysuria. Musculoskeletal: Negative for gait problem and myalgias. Skin: Negative for color change, pallor, rash and wound. Neurological: Negative for dizziness, syncope, facial asymmetry, weakness, light-headedness, numbness and headaches. Hematological: Does not bruise/bleed easily. Psychiatric/Behavioral: Negative for agitation, behavioral problems, confusion, hallucinations and suicidal ideas.  The patient is not nervous/anxious.    All other systems reviewed and are negative.           Physical Examination:    BP (!) 196/77   Pulse 83   Temp 97.9 °F (36.6 °C) (Oral)   Resp 17   home.      Electronically signed by Ashok Vazquez DO on 6/12/2021 at 12:37 PM

## 2021-06-12 NOTE — PROGRESS NOTES
Patient: Kallie Rodríguez  Unit/Bed:W163/W163-01  YOB: 1935  MRN: [id number]  Acct: [de-identified]   Admitting Diagnosis: Chest pain [R07.9]  Admit Date:  6/10/2021  Hospital Day: 0        Chief Complaint:   Chest pain     History of Present Illness:  82-year-old female who sunrays the patient 1 is admitted through the ER with complaint of chest discomfort across the chest.  He has seen by Dr. Christen Chinchilla once. Prior to that he was seen by Dr. Ghazala Brar. 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 She does not smoke. Compliant with medication. She usually walks on a treadmill 3 times a week. Cardiac markers are negative. 6-12-21: doing well. No CP or SOB.  S/p negative stress test.                 Allergies   Allergen Reactions    Carbapenems      Cephalosporins      Hydralazine Other (See Comments)    Pcn [Penicillins]           Current Facility-Administered Medications             Current Facility-Administered Medications   Medication Dose Route Frequency Provider Last Rate Last Admin    hydrocortisone 1 % cream   Topical BID Sujey Funes MD        sodium chloride flush 0.9 % injection 5-40 mL  5-40 mL Intravenous 2 times per day Sujey Funes MD   10 mL at 06/11/21 0827    sodium chloride flush 0.9 % injection 5-40 mL  5-40 mL Intravenous PRN Sujey Funes MD        0.9 % sodium chloride infusion  25 mL Intravenous PRN Sujey Funes MD        ondansetron (ZOFRAN-ODT) disintegrating tablet 4 mg  4 mg Oral Q8H PRN Sujey Funes MD         Or    ondansetron [facility]) injection 4 mg  4 mg Intravenous Q6H PRN Sujey Funes MD        acetaminophen (TYLENOL) tablet 650 mg  650 mg Oral Q6H PRN Sujey Funes MD         Or    acetaminophen (TYLENOL) suppository 650 mg  650 mg Rectal Q6H PRN Sujey Funes MD        polyethylene glycol (GLYCOLAX) packet 17 g  17 g Oral Daily PRN Sujey Funes MD        aspirin chewable tablet 81 mg  81 mg Oral Daily Dahlia Dejesus MD   81 mg at 06/11/21 0825    enoxaparin (LOVENOX) injection 40 mg  40 mg Subcutaneous Daily Dahlia Dejesus MD   40 mg at 06/11/21 0826    atorvastatin (LIPITOR) tablet 80 mg  80 mg Oral Nightly Dahlia Dejesus MD   80 mg at 06/10/21 2107    nitroGLYCERIN (NITROSTAT) SL tablet 0.4 mg  0.4 mg Sublingual Q5 Min PRN Dahlia Dejesus MD        acetaminophen (TYLENOL) tablet 1,000 mg  1,000 mg Oral BID Dahlia Dejesus MD   1,000 mg at 06/11/21 0825    atenolol (TENORMIN) tablet 25 mg  25 mg Oral Daily Dahlia Dejesus MD        diazePAM (VALIUM) tablet 5 mg  5 mg Oral BID Dahlia Dejesus MD   5 mg at 06/11/21 0825    valsartan (DIOVAN) tablet 320 mg  320 mg Oral Daily Dahlia Dejesus MD   320 mg at 06/11/21 0825            PMHx:  Past Medical History        Past Medical History:   Diagnosis Date    Anxiety      Cirrhosis ([facility] [address]      Coronary artery disease involving native coronary artery of native heart without angina pectoris 1/8/2018    HTN (hypertension)              PSHx:  Past Surgical History         Past Surgical History:   Procedure Laterality Date    CORONARY ARTERY BYPASS GRAFT                Social Hx:  Social History   Social History            Socioeconomic History    Marital status:        Spouse name: None    Number of children: None    Years of education: None    Highest education level: None   Occupational History    None   Tobacco Use    Smoking status: Never Smoker    Smokeless tobacco: Never Used   Vaping Use    Vaping Use: Never used   Substance and Sexual Activity    Alcohol use: No    Drug use: No    Sexual activity: None   Other Topics Concern    None   Social History Narrative    None      Social Determinants of Health          Financial Resource Strain:     Difficulty of Paying Living Expenses:    Food Insecurity:     Worried About Running Out of Food in the Last Year:     Ran Out of Food in the Last Year:    Transportation Needs:  Lack of Transportation (Medical):  Lack of Transportation (Non-Medical):    Physical Activity:     Days of Exercise per Week:     Minutes of Exercise per Session:    Stress:     Feeling of Stress :    Social Connections:     Frequency of Communication with Friends and Family:     Frequency of Social Gatherings with Friends and Family:     Attends Oriental orthodox Services:     Active Member of Clubs or Organizations:     Attends Club or Organization Meetings:     Marital Status:    Intimate Partner Violence:     Fear of Current or Ex-Partner:     Emotionally Abused:     Physically Abused:     Sexually Abused:             Family Hx:  Family History   History reviewed. No pertinent family history.        Review ofSystems:   Review of Systems   Constitutional: Negative for activity change, appetite change, diaphoresis, fatigue and unexpected weight change. HENT: Negative for facial swelling, nosebleeds, trouble swallowing and voice change. Respiratory: Negative for apnea, chest tightness, shortness of breath and wheezing. Cardiovascular: Negative for chest pain, palpitations and leg swelling. Gastrointestinal: Negative for abdominal distention, anal bleeding, blood in stool, nausea and vomiting. Genitourinary: Negative for decreased urine volume and dysuria. Musculoskeletal: Negative for gait problem and myalgias. Skin: Negative for color change, pallor, rash and wound. Neurological: Negative for dizziness, syncope, facial asymmetry, weakness, light-headedness, numbness and headaches. Hematological: Does not bruise/bleed easily. Psychiatric/Behavioral: Negative for agitation, behavioral problems, confusion, hallucinations and suicidal ideas. The patient is not nervous/anxious.     All other systems reviewed and are negative.           Physical Examination:    BP (!) 196/77   Pulse 83   Temp 97.9 °F (36.6 °C) (Oral)   Resp 17   Ht 5' 1.5\" (1.562 m)   Wt 162 lb (73.5 kg)   SpO2 98% BMI 30.11 kg/m²    Physical Exam  Constitutional:       Appearance: She is well-developed. HENT:      Head: Normocephalic and atraumatic. Right Ear: External ear normal.      Left Ear: External ear normal.   Eyes:      Conjunctiva/sclera: Conjunctivae normal.      Pupils: Pupils are equal, round, and reactive to light. Cardiovascular:      Rate and Rhythm: Normal rate and regular rhythm. Heart sounds: Normal heart sounds. Pulmonary:      Effort: Pulmonary effort is normal.      Breath sounds: Normal breath sounds. Abdominal:      General: Bowel sounds are normal.      Palpations: Abdomen is soft. Musculoskeletal:         General: Normal range of motion. Cervical back: Normal range of motion and neck supple. Skin:     General: Skin is warm and dry. Neurological:      Mental Status: She is alert and oriented to person, place, and time. Deep Tendon Reflexes: Reflexes are normal and symmetric. Psychiatric:         Behavior: Behavior normal.         Thought Content:  Thought content normal.         Judgment: Judgment normal.            LABS:  CBC:         Lab Results   Component Value Date     WBC 6.0 06/11/2021     RBC 4.41 06/11/2021     HGB 13.4 06/11/2021     HCT 39.7 06/11/2021     MCV 90.1 06/11/2021     MCH 30.5 06/11/2021     MCHC 33.9 06/11/2021     RDW 13.2 06/11/2021      06/11/2021     MPV 8.7 09/17/2014      CBC with Differential:         Lab Results   Component Value Date     WBC 6.0 06/11/2021     RBC 4.41 06/11/2021     HGB 13.4 06/11/2021     HCT 39.7 06/11/2021      06/11/2021     MCV 90.1 06/11/2021     MCH 30.5 06/11/2021     MCHC 33.9 06/11/2021     RDW 13.2 06/11/2021     LYMPHOPCT 27.1 06/10/2021     MONOPCT 10.8 06/10/2021     BASOPCT 0.9 06/10/2021     MONOSABS 0.7 06/10/2021     LYMPHSABS 1.8 06/10/2021     EOSABS 0.2 06/10/2021     BASOSABS 0.1 06/10/2021      CMP:          Lab Results   Component Value Date      06/10/2021     K 4.2 06/10/2021     K 4.3 07/31/2020      06/10/2021     CO2 31 06/10/2021     BUN 13 06/10/2021     CREATININE 0.71 06/10/2021     GFRAA >60.0 06/10/2021     LABGLOM >60.0 06/10/2021     GLUCOSE 105 06/10/2021     PROT 7.8 06/10/2021     LABALBU 4.5 06/10/2021     CALCIUM 9.9 06/10/2021     BILITOT 0.3 06/10/2021     ALKPHOS 98 06/10/2021     AST 25 06/10/2021     ALT 21 06/10/2021      BMP:          Lab Results   Component Value Date      06/10/2021     K 4.2 06/10/2021     K 4.3 07/31/2020      06/10/2021     CO2 31 06/10/2021     BUN 13 06/10/2021     LABALBU 4.5 06/10/2021     CREATININE 0.71 06/10/2021     CALCIUM 9.9 06/10/2021     GFRAA >60.0 06/10/2021     LABGLOM >60.0 06/10/2021     GLUCOSE 105 06/10/2021      Magnesium:          Lab Results   Component Value Date     MG 1.7 06/11/2021      Troponin:          Lab Results   Component Value Date     TROPONINI <0.010 06/10/2021          Recent Labs     06/10/21  1545   PROBNP 286      No results for input(s): INR in the last 72 hours.     RADIOLOGY:  XR CHEST PORTABLE     Result Date: 6/10/2021  XR CHEST PORTABLE : 6/10/2021 CLINICAL HISTORY:  chest pain . COMPARISON: 7/31/2020. TECHNIQUE: A portable upright AP radiograph of the chest was obtained. FINDINGS: Shallow inspiratory volumes are present without significant infiltrate identified. There is no cardiomegaly, significant pleural effusion, vascular congestion, pneumothorax, or displaced fractures identified. 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      NO EVIDENCE OF ACTIVE CARDIOPULMONARY DISEASE, BY PORTABLE CHEST RADIOGRAPHY.            EKG:  Assessment:         Active Hospital Problems     Diagnosis Date Noted    Chest pain [R07.9] 06/10/2021       ok to [place] home  Follow up with dr Pavel Buckley  If has recurrent symptoms then consider [facility].     Plan:  1. Max cardiac meds  2. Rfm  3. Ok to [facility] home.      Electronically signed by Maureen Webster DO on 6/12/2021 at 12:37 PM

## 2021-06-28 ENCOUNTER — OFFICE VISIT (OUTPATIENT)
Dept: CARDIOLOGY CLINIC | Age: 86
End: 2021-06-28
Payer: MEDICARE

## 2021-06-28 VITALS
DIASTOLIC BLOOD PRESSURE: 84 MMHG | HEART RATE: 79 BPM | OXYGEN SATURATION: 98 % | WEIGHT: 174 LBS | SYSTOLIC BLOOD PRESSURE: 138 MMHG | BODY MASS INDEX: 32.02 KG/M2 | HEIGHT: 62 IN

## 2021-06-28 DIAGNOSIS — R00.2 PALPITATIONS: Primary | ICD-10-CM

## 2021-06-28 DIAGNOSIS — G45.9 TIA (TRANSIENT ISCHEMIC ATTACK): ICD-10-CM

## 2021-06-28 PROCEDURE — 1036F TOBACCO NON-USER: CPT | Performed by: INTERNAL MEDICINE

## 2021-06-28 PROCEDURE — 1090F PRES/ABSN URINE INCON ASSESS: CPT | Performed by: INTERNAL MEDICINE

## 2021-06-28 PROCEDURE — G8417 CALC BMI ABV UP PARAM F/U: HCPCS | Performed by: INTERNAL MEDICINE

## 2021-06-28 PROCEDURE — 99213 OFFICE O/P EST LOW 20 MIN: CPT | Performed by: INTERNAL MEDICINE

## 2021-06-28 PROCEDURE — 1123F ACP DISCUSS/DSCN MKR DOCD: CPT | Performed by: INTERNAL MEDICINE

## 2021-06-28 PROCEDURE — G8428 CUR MEDS NOT DOCUMENT: HCPCS | Performed by: INTERNAL MEDICINE

## 2021-06-28 PROCEDURE — 1111F DSCHRG MED/CURRENT MED MERGE: CPT | Performed by: INTERNAL MEDICINE

## 2021-06-28 PROCEDURE — G8400 PT W/DXA NO RESULTS DOC: HCPCS | Performed by: INTERNAL MEDICINE

## 2021-06-28 PROCEDURE — 4040F PNEUMOC VAC/ADMIN/RCVD: CPT | Performed by: INTERNAL MEDICINE

## 2021-06-28 RX ORDER — AMLODIPINE BESYLATE 5 MG/1
TABLET ORAL
COMMUNITY
End: 2022-07-10

## 2021-06-28 RX ORDER — FAMOTIDINE 20 MG/1
TABLET, FILM COATED ORAL
COMMUNITY
Start: 2020-07-08

## 2021-06-28 RX ORDER — ATENOLOL 25 MG/1
TABLET ORAL
COMMUNITY
End: 2022-07-10

## 2021-06-28 ASSESSMENT — ENCOUNTER SYMPTOMS
CHEST TIGHTNESS: 0
SHORTNESS OF BREATH: 0

## 2021-06-28 NOTE — PROGRESS NOTES
[facility] CARDIOLOGY OFFICE FOLLOW-UP      Patient: Keyanna Chow  YOB: 1935  MRN: [id number]    Chief Complaint:  Chief Complaint   Patient presents with   Conor Zelaya New Doctor     [facility] patient     Follow-Up from Hospital    Palpitations    Hypertension         Subjective/HPI:  6/28/21: Patient presents today for follow-up of her stress test that was negative for ischemia. From that the eject fraction is 80%. She had prior open heart surgery 15 years ago. She sees Dr. Flako Del Rosario. Her pain is improved. She is on Coreg 6.25 mg twice daily, Lipitor 20 mg a day, and Diovan 320 mg. No further chest pain. See me in 1 month            Past Medical History:   Diagnosis Date    Anxiety     Cirrhosis ([address]     Coronary artery disease involving native coronary artery of native heart without angina pectoris 1/8/2018    HTN (hypertension)        Past Surgical History:   Procedure Laterality Date    CORONARY ARTERY BYPASS GRAFT         No family history on file. Social History     Socioeconomic History    Marital status:      Spouse name: Not on file    Number of children: Not on file    Years of education: Not on file    Highest education level: Not on file   Occupational History    Not on file   Tobacco Use    Smoking status: Never Smoker    Smokeless tobacco: Never Used   Vaping Use    Vaping Use: Never used   Substance and Sexual Activity    Alcohol use: No    Drug use: No    Sexual activity: Not on file   Other Topics Concern    Not on file   Social History Narrative    Not on file     Social Determinants of Health     Financial Resource Strain:     Difficulty of Paying Living Expenses:    Food Insecurity:     Worried About Running Out of Food in the Last Year:     [address] in the Last Year:    Transportation Needs:     Lack of Transportation (Medical):      Lack of Transportation (Non-Medical):    Physical Activity:     Days of Exercise per Week:     Minutes of Exercise per Session:    Stress:     Feeling of Stress :    Social Connections:     Frequency of Communication with Friends and Family:     Frequency of Social Gatherings with Friends and Family:     Attends Adventist Services:     Active Member of Clubs or Organizations:     Attends Club or Organization Meetings:     Marital Status:    Intimate Partner Violence:     Fear of Current or Ex-Partner:     Emotionally Abused:     Physically Abused:     Sexually Abused: Allergies   Allergen Reactions    Carbapenems     Cephalosporins     Hydralazine Other (See Comments)    Pcn [Penicillins]        Current Outpatient Medications   Medication Sig Dispense Refill    atenolol (TENORMIN) 25 MG tablet Take by mouth      famotidine (PEPCID) 20 MG tablet Take by mouth      sertraline (ZOLOFT) 50 MG tablet Take 1 tablet by mouth daily 30 tablet 0    carvedilol (COREG) 6.25 MG tablet Take 1 tablet by mouth 2 times daily 60 tablet 6    acetaminophen (TYLENOL) 500 MG tablet Take 1,000 mg by mouth 2 times daily      valsartan (DIOVAN) 320 MG tablet Take 320 mg by mouth daily      atorvastatin (LIPITOR) 20 MG tablet Take 20 mg by mouth nightly      vitamin B-12 (CYANOCOBALAMIN) 1000 MCG tablet Take 2,000 mcg by mouth daily      Probiotic Product (PROBIOTIC DAILY PO) Take 1 capsule by mouth      aspirin (ECOTRIN) 325 MG EC tablet Take 325 mg by mouth daily      diazepam (VALIUM) 2 MG tablet Take 5 mg by mouth 2 times daily.  amLODIPine (NORVASC) 5 MG tablet Take by mouth (Patient not taking: Reported on 6/28/2021)       No current facility-administered medications for this visit. Review of Systems:   Review of Systems   Respiratory: Negative for chest tightness and shortness of breath. Cardiovascular: Positive for chest pain. Negative for palpitations and leg swelling. Musculoskeletal: Negative for myalgias. Neurological: Positive for dizziness and light-headedness.  Negative for syncope, weakness, numbness and headaches. Hematological: Does not bruise/bleed easily. Review of System is negative except for as mentioned above. Physical Examination:    /84 (Site: Left Upper Arm, Position: Sitting, Cuff Size: Large Adult)   Pulse 79   Ht 5' 1.5\" (1.562 m)   Wt 174 lb (78.9 kg)   SpO2 98%   BMI 32.34 kg/m²    Physical Exam        Patient Active Problem List   Diagnosis    Abnormal finding on EKG    Anxiety disorder, unspecified    Ataxia, unspecified    Coronary artery disease involving native coronary artery of native heart without angina pectoris    Heart palpitations    TIA (transient ischemic attack)    Chest pain    Angina at rest [facility])           Orders Placed This Encounter   Procedures    [place] DISCHARGE MEDS RECONCILED W/ CURRENT OUTPATIENT MED LIST         Orders Placed This Encounter   Medications    sertraline (ZOLOFT) 50 MG tablet     Sig: Take 1 tablet by mouth daily     Dispense:  30 tablet     Refill:  0     SEE PRIMARY CARE FOR FURTHER REFILLS             Assessment/Orders:       ICD-10-CM    1. Palpitations  R00.2 [place] DISCHARGE MEDS RECONCILED W/ CURRENT OUTPATIENT MED LIST   2. TIA (transient ischemic attack)  G45.9 [place] DISCHARGE MEDS RECONCILED W/ CURRENT OUTPATIENT MED LIST       Orders Placed This Encounter   Medications    sertraline (ZOLOFT) 50 MG tablet     Sig: Take 1 tablet by mouth daily     Dispense:  30 tablet     Refill:  0     SEE PRIMARY CARE FOR FURTHER REFILLS       There are no discontinued medications. Orders Placed This Encounter   Procedures    [place] DISCHARGE MEDS RECONCILED W/ CURRENT OUTPATIENT MED LIST         Plan:  Prescribed Zoloft 50mg Daily    Stay on same medications.     See me in 1 month         Electronically signed by: Migue Jacob MD  7/1/2021 3:23 PM

## 2021-07-01 ASSESSMENT — ENCOUNTER SYMPTOMS
CHEST TIGHTNESS: 0
SHORTNESS OF BREATH: 0
APNEA: 0
COLOR CHANGE: 0

## 2021-07-01 NOTE — PROGRESS NOTES
[facility] CARDIOLOGY OFFICE FOLLOW-UP      Patient: Minus Landau  YOB: 1935  MRN: [id number]    Chief Complaint:  Chief Complaint   Patient presents with   Mauricio Foote     [facility] patient     Follow-Up from Hospital    Palpitations    Hypertension         Subjective/HPI:  6/28/21: Patient presents today for follow-up of her stress test that was negative for ischemia. From that the eject fraction is 80%. She had prior open heart surgery 15 years ago. She sees Dr. David Montes De Oca. Her pain is improved. She is on Coreg 6.25 mg twice daily, Lipitor 20 mg a day, and Diovan 320 mg. No further chest pain. See me in 1 month       Past Medical History:   Diagnosis Date    Anxiety     Cirrhosis ([address]     Coronary artery disease involving native coronary artery of native heart without angina pectoris 1/8/2018    HTN (hypertension)        Past Surgical History:   Procedure Laterality Date    CORONARY ARTERY BYPASS GRAFT         No family history on file. Social History     Socioeconomic History    Marital status:      Spouse name: Not on file    Number of children: Not on file    Years of education: Not on file    Highest education level: Not on file   Occupational History    Not on file   Tobacco Use    Smoking status: Never Smoker    Smokeless tobacco: Never Used   Vaping Use    Vaping Use: Never used   Substance and Sexual Activity    Alcohol use: No    Drug use: No    Sexual activity: Not on file   Other Topics Concern    Not on file   Social History Narrative    Not on file     Social Determinants of Health     Financial Resource Strain:     Difficulty of Paying Living Expenses:    Food Insecurity:     Worried About Running Out of Food in the Last Year:     [address] in the Last Year:    Transportation Needs:     Lack of Transportation (Medical):      Lack of Transportation (Non-Medical):    Physical Activity:     Days of Exercise per Week:     Minutes of Exercise per Session:    Stress:     Feeling of Stress :    Social Connections:     Frequency of Communication with Friends and Family:     Frequency of Social Gatherings with Friends and Family:     Attends [employer] Services:     Active Member of Clubs or Organizations:     Attends Club or Organization Meetings:     Marital Status:    Intimate Partner Violence:     Fear of Current or Ex-Partner:     Emotionally Abused:     Physically Abused:     Sexually Abused: Allergies   Allergen Reactions    Carbapenems     Cephalosporins     Hydralazine Other (See Comments)    Pcn [Penicillins]        Current Outpatient Medications   Medication Sig Dispense Refill    atenolol (TENORMIN) 25 MG tablet Take by mouth      famotidine (PEPCID) 20 MG tablet Take by mouth      sertraline (ZOLOFT) 50 MG tablet Take 1 tablet by mouth daily 30 tablet 0    carvedilol (COREG) 6.25 MG tablet Take 1 tablet by mouth 2 times daily 60 tablet 6    acetaminophen (TYLENOL) 500 MG tablet Take 1,000 mg by mouth 2 times daily      valsartan (DIOVAN) 320 MG tablet Take 320 mg by mouth daily      atorvastatin (LIPITOR) 20 MG tablet Take 20 mg by mouth nightly      vitamin B-12 (CYANOCOBALAMIN) 1000 MCG tablet Take 2,000 mcg by mouth daily      Probiotic Product (PROBIOTIC DAILY PO) Take 1 capsule by mouth      aspirin (ECOTRIN) 325 MG EC tablet Take 325 mg by mouth daily      diazepam (VALIUM) 2 MG tablet Take 5 mg by mouth 2 times daily.  amLODIPine (NORVASC) 5 MG tablet Take by mouth (Patient not taking: Reported on 6/28/2021)       No current facility-administered medications for this visit. Review of Systems:   Review of Systems   Constitutional: Negative for appetite change, diaphoresis and fatigue. Respiratory: Negative for apnea, chest tightness and shortness of breath. Cardiovascular: Negative for chest pain, palpitations and leg swelling.    Skin: Negative for color change, pallor, rash and wound. Neurological: Negative for dizziness, syncope, weakness, light-headedness and headaches. Psychiatric/Behavioral: Negative for agitation, behavioral problems and confusion. The patient is not nervous/anxious and is not hyperactive. Review of System is negative except for as mentioned above. Physical Examination:    /84 (Site: Left Upper Arm, Position: Sitting, Cuff Size: Large Adult)   Pulse 79   Ht 5' 1.5\" (1.562 m)   Wt 174 lb (78.9 kg)   SpO2 98%   BMI 32.34 kg/m²    Physical Exam   Constitutional: She appears healthy. HENT:   Nose: Nose normal.   Mouth/Throat: Dentition is normal. Oropharynx is clear. Eyes: Pupils are equal, round, and reactive to light. Cardiovascular: Normal rate, regular rhythm, S1 normal, S2 normal, normal heart sounds, intact distal pulses and normal pulses. No extrasystoles are present. Exam reveals no gallop. No murmur heard. Pulmonary/Chest: Effort normal and breath sounds normal. She has no wheezes. She has no rales. She exhibits no tenderness. Abdominal: Soft. Bowel sounds are normal. She exhibits no distension and no mass. There is no splenomegaly or hepatomegaly. There is no abdominal tenderness. Musculoskeletal:         General: No tenderness, deformity or edema. Normal range of motion. Cervical back: Normal range of motion. Neurological: She is alert and oriented to person, place, and time. She has normal motor skills and normal reflexes. Gait normal.   Skin: Skin is warm and dry. Patient Active Problem List   Diagnosis    Abnormal finding on EKG    Anxiety disorder, unspecified    Ataxia, unspecified    Coronary artery disease involving native coronary artery of native heart without angina pectoris    Heart palpitations    TIA (transient ischemic attack)    Chest pain    Angina at rest [facility])           No orders of the defined types were placed in this encounter.         Orders Placed This Encounter Medications    sertraline (ZOLOFT) 50 MG tablet     Sig: Take 1 tablet by mouth daily     Dispense:  30 tablet     Refill:  0     SEE PRIMARY CARE FOR FURTHER REFILLS             Assessment/Orders:       ICD-10-CM    1. Palpitations  R00.2    2. TIA (transient ischemic attack)  G45.9        Orders Placed This Encounter   Medications    sertraline (ZOLOFT) 50 MG tablet     Sig: Take 1 tablet by mouth daily     Dispense:  30 tablet     Refill:  0     SEE PRIMARY CARE FOR FURTHER REFILLS       There are no discontinued medications. No orders of the defined types were placed in this encounter. Plan:  Prescribed Zoloft 50 mg daily    Stay on same medications.     See me in 1 month        Electronically signed by: Guerda Talbert MD  7/1/2021 3:17 PM

## 2021-08-09 ENCOUNTER — HOSPITAL ENCOUNTER (EMERGENCY)
Age: 86
Discharge: HOME | End: 2021-08-09
Payer: MEDICARE

## 2021-08-09 VITALS
BODY MASS INDEX: 31.47 KG/M2 | SYSTOLIC BLOOD PRESSURE: 179 MMHG | HEIGHT: 62 IN | OXYGEN SATURATION: 95 % | HEART RATE: 94 BPM | DIASTOLIC BLOOD PRESSURE: 70 MMHG | TEMPERATURE: 97.1 F | WEIGHT: 171 LBS | RESPIRATION RATE: 18 BRPM

## 2021-08-09 DIAGNOSIS — R11.0 NAUSEA: Primary | ICD-10-CM

## 2021-08-09 PROCEDURE — 99284 EMERGENCY DEPT VISIT MOD MDM: CPT

## 2021-08-09 PROCEDURE — 6370000000 HC RX 637 (ALT 250 FOR IP): Performed by: PHYSICIAN ASSISTANT

## 2021-08-09 RX ORDER — ONDANSETRON 4 MG/1
4 TABLET, FILM COATED ORAL EVERY 12 HOURS PRN
Qty: 10 TABLET | Refills: 0 | Status: SHIPPED | OUTPATIENT
Start: 2021-08-09

## 2021-08-09 RX ORDER — ONDANSETRON 4 MG/1
4 TABLET, ORALLY DISINTEGRATING ORAL ONCE
Status: COMPLETED | OUTPATIENT
Start: 2021-08-09 | End: 2021-08-09

## 2021-08-09 RX ORDER — CARVEDILOL 6.25 MG/1
6.25 TABLET ORAL ONCE
Status: COMPLETED | OUTPATIENT
Start: 2021-08-09 | End: 2021-08-09

## 2021-08-09 RX ADMIN — ONDANSETRON 4 MG: 4 TABLET, ORALLY DISINTEGRATING ORAL at 16:10

## 2021-08-09 RX ADMIN — CARVEDILOL 6.25 MG: 6.25 TABLET, FILM COATED ORAL at 16:24

## 2021-08-09 ASSESSMENT — ENCOUNTER SYMPTOMS
VOMITING: 0
ANAL BLEEDING: 0
NAUSEA: 1
APNEA: 0
ABDOMINAL PAIN: 0
PHOTOPHOBIA: 0
BLOOD IN STOOL: 1
COUGH: 0
VOICE CHANGE: 0
ABDOMINAL DISTENTION: 0
EYE DISCHARGE: 0

## 2021-08-09 NOTE — ED NOTES
This RN at bedside with Lit HADDAD for exam.     Treva Macias.  [facility]  08/09/21 9408

## 2021-08-09 NOTE — ED PROVIDER NOTES
[address] [facility] ED  eMERGENCY dEPARTMENT eNCOUnter      Pt Name: Jacek Conrad  MRN: [id number]  [place] 1935  Date of evaluation: 8/9/2021  Provider: Sam Schrader PA-C    CHIEF COMPLAINT       Chief Complaint   Patient presents with    Other     reports black stools x 2 days, reports taking 3 new meds 3 days ago. HISTORY OF PRESENT ILLNESS   (Location/Symptom, Timing/Onset,Context/Setting, Quality, Duration, Modifying Factors, Severity)  Note limiting factors. Jacek Conrad is a 82 y.o. female who presents to the emergency department reports black stools x2 days after starting new medications including sertraline Coreg famotidine and Pepto-Bismol. She does have some nauseous denies chest pain abdominal pain denies vomiting. Symptoms mild severity nothing proves worsen symptoms. HPI    NursingNotes were reviewed. REVIEW OF SYSTEMS    (2-9 systems for level 4, 10 or more for level 5)     Review of Systems   Constitutional: Negative for activity change, appetite change, fever and unexpected weight change. HENT: Negative for ear discharge, nosebleeds and voice change. Eyes: Negative for photophobia and discharge. Respiratory: Negative for apnea and cough. Cardiovascular: Negative for chest pain. Gastrointestinal: Positive for blood in stool and nausea. Negative for abdominal distention, abdominal pain, anal bleeding and vomiting. Endocrine: Negative for cold intolerance, heat intolerance and polyphagia. Genitourinary: Negative for genital sores. Musculoskeletal: Negative for joint swelling. Skin: Negative for pallor. Allergic/Immunologic: Negative for immunocompromised state. Neurological: Negative for seizures and facial asymmetry. Hematological: Does not bruise/bleed easily. Psychiatric/Behavioral: Negative for behavioral problems, self-injury and sleep disturbance. All other systems reviewed and are negative.       Except as noted above the remainder of the review of systems was reviewed and negative. PAST MEDICAL HISTORY     Past Medical History:   Diagnosis Date    Anxiety     [address] [address]     Coronary artery disease involving native coronary artery of native heart without angina pectoris 1/8/2018    HTN (hypertension)          SURGICALHISTORY       Past Surgical History:   Procedure Laterality Date    CORONARY ARTERY BYPASS GRAFT           CURRENT MEDICATIONS       Previous Medications    ACETAMINOPHEN (TYLENOL) 500 MG TABLET    Take 1,000 mg by mouth 2 times daily    AMLODIPINE (NORVASC) 5 MG TABLET    Take by mouth    ASPIRIN (ECOTRIN) 325 MG EC TABLET    Take 325 mg by mouth daily    ATENOLOL (TENORMIN) 25 MG TABLET    Take by mouth    ATORVASTATIN (LIPITOR) 20 MG TABLET    Take 20 mg by mouth nightly    CARVEDILOL (COREG) 6.25 MG TABLET    Take 1 tablet by mouth 2 times daily    DIAZEPAM (VALIUM) 2 MG TABLET    Take 5 mg by mouth 2 times daily. FAMOTIDINE (PEPCID) 20 MG TABLET    Take by mouth    PROBIOTIC PRODUCT (PROBIOTIC DAILY PO)    Take 1 capsule by mouth    SERTRALINE (ZOLOFT) 50 MG TABLET    Take 1 tablet by mouth daily    VALSARTAN (DIOVAN) 320 MG TABLET    Take 320 mg by mouth daily    VITAMIN B-12 (CYANOCOBALAMIN) 1000 MCG TABLET    Take 2,000 mcg by mouth daily       ALLERGIES     Carbapenems, Cephalosporins, Hydralazine, and Pcn [penicillins]    FAMILY HISTORY     No family history on file.        SOCIAL HISTORY       Social History     Socioeconomic History    Marital status:      Spouse name: Not on file    Number of children: Not on file    Years of education: Not on file    Highest education level: Not on file   Occupational History    Not on file   Tobacco Use    Smoking status: Never Smoker    Smokeless tobacco: Never Used   Vaping Use    Vaping Use: Never used   Substance and Sexual Activity    Alcohol use: No    Drug use: No    Sexual activity: Not on file   Other Topics Concern    Not on file   Social History Narrative    Not on file     Social Determinants of Health     Financial Resource Strain:     Difficulty of Paying Living Expenses:    Food Insecurity:     Worried About Running Out of Food in the Last Year:     [address] in the Last Year:    Transportation Needs:     Lack of Transportation (Medical):  Lack of Transportation (Non-Medical):    Physical Activity:     Days of Exercise per Week:     Minutes of Exercise per Session:    Stress:     Feeling of Stress :    Social Connections:     Frequency of Communication with Friends and Family:     Frequency of Social Gatherings with Friends and Family:     Attends [employer] Services:     Active Member of Clubs or Organizations:     Attends Club or Organization Meetings:     Marital Status:    Intimate Partner Violence:     Fear of Current or Ex-Partner:     Emotionally Abused:     Physically Abused:     Sexually Abused:        SCREENINGS      @FLOW([id number])@      PHYSICAL EXAM    (up to 7 for level 4, 8 or more for level 5)     ED Triage Vitals [08/09/21 1154]   BP Temp Temp src Pulse Resp SpO2 Height Weight   (!) 161/80 97.1 °F (36.2 °C) -- 101 17 97 % 5' 1.5\" (1.562 m) 171 lb (77.6 kg)       Physical Exam  Vitals and nursing note reviewed. Constitutional:       General: She is not in acute distress. Appearance: She is well-developed. HENT:      Head: Normocephalic and atraumatic. Right Ear: External ear normal.      Left Ear: External ear normal.      Nose: Nose normal.      Mouth/Throat:      Mouth: Mucous membranes are moist.      Pharynx: No oropharyngeal exudate or posterior oropharyngeal erythema. Eyes:      General:         Right eye: No discharge. Left eye: No discharge. Extraocular Movements: Extraocular movements intact. Pupils: Pupils are equal, round, and reactive to light. Cardiovascular:      Rate and Rhythm: Normal rate and regular rhythm.       Heart sounds: Normal heart sounds. Pulmonary:      Effort: Pulmonary effort is normal. No respiratory distress. Breath sounds: Normal breath sounds. No stridor. Abdominal:      General: Bowel sounds are normal. There is no distension. Palpations: Abdomen is soft. Tenderness: There is no abdominal tenderness. There is no right CVA tenderness, left CVA tenderness or guarding. Genitourinary:     Rectum: Guaiac result negative. Musculoskeletal:         General: Normal range of motion. Cervical back: Normal range of motion and neck supple. No tenderness. Skin:     General: Skin is warm. Findings: No erythema. Neurological:      Mental Status: She is alert and oriented to person, place, and time. DIAGNOSTIC RESULTS     EKG: All EKG's are interpreted by the Emergency Department Physician who either signs or Co-signsthis chart in the absence of a cardiologist.         RADIOLOGY:   Radha Jenelle such as CT, Ultrasound and MRI are read by the radiologist. Plain radiographic images are visualized and preliminarily interpreted by the emergency physician with the below findings:         Interpretation per the Radiologist below, if available at the time ofthis note:    No orders to display         ED BEDSIDE ULTRASOUND:   Performed by ED Physician - none    LABS:  Labs Reviewed - No data to display    All other labs were within normal range or not returned as of this dictation. EMERGENCY DEPARTMENT COURSE and DIFFERENTIAL DIAGNOSIS/MDM:   Vitals:    Vitals:    08/09/21 1154 08/09/21 1600   BP: (!) 161/80 (!) 185/71   Pulse: 101 98   Resp: 17 18   Temp: 97.1 °F (36.2 °C)    SpO2: 97% 95%   Weight: 171 lb (77.6 kg)    Height: 5' 1.5\" (1.562 m)             MDM  Number of Diagnoses or Management Options  Diagnosis management comments: Presents with possible rectal bleeding she started Pepto-Bismol.   Her Hemoccult with nursing at bedside is negative no active bleeding no tenderness nonthrombosed hemorrhoid noted. Patient amatory in emergency room. She is due to take her evening medications including her blood pressure medication Coreg as well as her sertraline and famotidine. She is to follow-up with primary care physician will prescribe Zofran for intermittent nauseousness return to if any symptoms worsen is developed. Amount and/or Complexity of Data Reviewed  Discuss the patient with other providers: yes        CRITICAL CARE TIME     CONSULTS:  None    PROCEDURES:  Unless otherwise noted below, none     Procedures    FINAL IMPRESSION      1.  Nausea          DISPOSITION/PLAN   DISPOSITION Decision To Discharge 08/09/2021 04:13:42 PM      PATIENT REFERRED TO:  Beth Hernadez DO  [address]  [phone]    Call in 1 day      [facility]) ED  [address] [address]  [phone]    If symptoms worsen      DISCHARGE MEDICATIONS:  New Prescriptions    No medications on file          (Please note that portions of this note were completed with a voice recognition program.  Efforts were made to edit the dictations but occasionally words are mis-transcribed.)    Darcy Raya PA-C (electronically signed)  Attending Emergency Physician       Darcy Raya PA-C  08/09/21 5233

## 2021-08-09 NOTE — ED NOTES
Discharge instructions reviewed with patient. Verbalized understanding. Ambulated off unit with a steady gait. Mitzi Jones, [facility]  08/09/21 2294

## 2021-08-22 ENCOUNTER — HOSPITAL ENCOUNTER (EMERGENCY)
Age: 86
Discharge: HOME | End: 2021-08-22
Payer: MEDICARE

## 2021-08-22 VITALS
DIASTOLIC BLOOD PRESSURE: 89 MMHG | WEIGHT: 165 LBS | RESPIRATION RATE: 16 BRPM | OXYGEN SATURATION: 95 % | TEMPERATURE: 99 F | HEIGHT: 61 IN | BODY MASS INDEX: 31.15 KG/M2 | SYSTOLIC BLOOD PRESSURE: 199 MMHG | HEART RATE: 81 BPM

## 2021-08-22 DIAGNOSIS — R11.0 NAUSEA: Primary | ICD-10-CM

## 2021-08-22 DIAGNOSIS — K29.00 ACUTE GASTRITIS WITHOUT HEMORRHAGE, UNSPECIFIED GASTRITIS TYPE: ICD-10-CM

## 2021-08-22 LAB
ALBUMIN SERPL-MCNC: 4.3 G/DL (ref 3.5–4.6)
ALP BLD-CCNC: 108 U/L (ref 40–130)
ALT SERPL-CCNC: 13 U/L (ref 0–33)
ANION GAP SERPL CALCULATED.3IONS-SCNC: 11 MEQ/L (ref 9–15)
AST SERPL-CCNC: 20 U/L (ref 0–35)
BASOPHILS ABSOLUTE: 0.1 K/UL (ref 0–0.2)
BASOPHILS RELATIVE PERCENT: 0.7 %
BILIRUB SERPL-MCNC: 0.4 MG/DL (ref 0.2–0.7)
BUN BLDV-MCNC: 8 MG/DL (ref 8–23)
CALCIUM SERPL-MCNC: 9.4 MG/DL (ref 8.5–9.9)
CHLORIDE BLD-SCNC: 101 MEQ/L (ref 95–107)
CO2: 28 MEQ/L (ref 20–31)
CREAT SERPL-MCNC: 0.62 MG/DL (ref 0.5–0.9)
EOSINOPHILS ABSOLUTE: 0.1 K/UL (ref 0–0.7)
EOSINOPHILS RELATIVE PERCENT: 1.1 %
GFR AFRICAN AMERICAN: >60
GFR NON-AFRICAN AMERICAN: >60
GLOBULIN: 3.4 G/DL (ref 2.3–3.5)
GLUCOSE BLD-MCNC: 119 MG/DL (ref 70–99)
HCT VFR BLD CALC: 43.3 % (ref 37–47)
HEMOGLOBIN: 14.5 G/DL (ref 12–16)
LYMPHOCYTES ABSOLUTE: 1.4 K/UL (ref 1–4.8)
LYMPHOCYTES RELATIVE PERCENT: 17.2 %
MCH RBC QN AUTO: 29.7 PG (ref 27–31.3)
MCHC RBC AUTO-ENTMCNC: 33.5 % (ref 33–37)
MCV RBC AUTO: 88.8 FL (ref 82–100)
MONOCYTES ABSOLUTE: 0.9 K/UL (ref 0.2–0.8)
MONOCYTES RELATIVE PERCENT: 10.8 %
NEUTROPHILS ABSOLUTE: 5.7 K/UL (ref 1.4–6.5)
NEUTROPHILS RELATIVE PERCENT: 70.2 %
PDW BLD-RTO: 13.6 % (ref 11.5–14.5)
PLATELET # BLD: 271 K/UL (ref 130–400)
POTASSIUM SERPL-SCNC: 3.9 MEQ/L (ref 3.4–4.9)
RBC # BLD: 4.87 M/UL (ref 4.2–5.4)
SARS-COV-2, NAAT: NOT DETECTED
SODIUM BLD-SCNC: 140 MEQ/L (ref 135–144)
TOTAL PROTEIN: 7.7 G/DL (ref 6.3–8)
WBC # BLD: 8.2 K/UL (ref 4.8–10.8)

## 2021-08-22 PROCEDURE — 87635 SARS-COV-2 COVID-19 AMP PRB: CPT

## 2021-08-22 PROCEDURE — 93005 ELECTROCARDIOGRAM TRACING: CPT | Performed by: PHYSICIAN ASSISTANT

## 2021-08-22 PROCEDURE — 2580000003 HC RX 258: Performed by: PHYSICIAN ASSISTANT

## 2021-08-22 PROCEDURE — 85025 COMPLETE CBC W/AUTO DIFF WBC: CPT

## 2021-08-22 PROCEDURE — 36415 COLL VENOUS BLD VENIPUNCTURE: CPT

## 2021-08-22 PROCEDURE — 80053 COMPREHEN METABOLIC PANEL: CPT

## 2021-08-22 PROCEDURE — 96374 THER/PROPH/DIAG INJ IV PUSH: CPT

## 2021-08-22 PROCEDURE — 99283 EMERGENCY DEPT VISIT LOW MDM: CPT

## 2021-08-22 PROCEDURE — 6360000002 HC RX W HCPCS: Performed by: PHYSICIAN ASSISTANT

## 2021-08-22 RX ORDER — METOCLOPRAMIDE 10 MG/1
TABLET ORAL
Qty: 28 TABLET | Refills: 0 | Status: SHIPPED | OUTPATIENT
Start: 2021-08-22 | End: 2022-07-10

## 2021-08-22 RX ORDER — ONDANSETRON 2 MG/ML
4 INJECTION INTRAMUSCULAR; INTRAVENOUS ONCE
Status: COMPLETED | OUTPATIENT
Start: 2021-08-22 | End: 2021-08-22

## 2021-08-22 RX ORDER — 0.9 % SODIUM CHLORIDE 0.9 %
1000 INTRAVENOUS SOLUTION INTRAVENOUS ONCE
Status: COMPLETED | OUTPATIENT
Start: 2021-08-22 | End: 2021-08-22

## 2021-08-22 RX ADMIN — ONDANSETRON 4 MG: 2 INJECTION INTRAMUSCULAR; INTRAVENOUS at 09:12

## 2021-08-22 RX ADMIN — SODIUM CHLORIDE 1000 ML: 9 INJECTION, SOLUTION INTRAVENOUS at 09:13

## 2021-08-22 ASSESSMENT — ENCOUNTER SYMPTOMS
SHORTNESS OF BREATH: 0
EYE DISCHARGE: 0
VOMITING: 0
NAUSEA: 1
COUGH: 0
SORE THROAT: 0
ABDOMINAL DISTENTION: 0
RHINORRHEA: 0
ABDOMINAL PAIN: 0
CONSTIPATION: 0
BACK PAIN: 0
COLOR CHANGE: 0

## 2021-08-22 NOTE — ED PROVIDER NOTES
[facility] [facility]  eMERGENCY dEPARTMENT eNCOUnter      Pt Name: Ortiz Garrido  MRN: [id number]  [place] 1935  Date of evaluation: 8/22/2021  Provider: Ivan Rajput PA-C    CHIEF COMPLAINT       Chief Complaint   Patient presents with    Nausea         HISTORY OF PRESENT ILLNESS   (Location/Symptom, Timing/Onset,Context/Setting, Quality, Duration, Modifying Factors, Severity)  Note limiting factors. Ortiz Garrido is a 82 y.o. female who presents to the emergency department with complaint of nausea. Patient states that she has had this ongoing nausea for approximately 1 week now. She states that whenever she eats she has decreased appetite, increasing nausea, she is seen her primary doctor for the same issue, and started on famotidine. She states she is also being weaned off of Valium, and on sertraline for ongoing anxiety issues she is slowly weaning herself down. She has no issues related to this. No chest pain no shortness of breath, no dizziness. She presents to the emerge department today because she states that she just has no appetite, is concerned that she may have an infection or something causing her problem. She states no increased pain while eating, no fevers, no cough, no diarrhea, no urinary complaints. She denies any pain at this time, 0 out of 10. Past medical history significant for coronary disease, anxiety, cirrhosis, hypertension. HPI    NursingNotes were reviewed. REVIEW OF SYSTEMS    (2-9 systems for level 4, 10 or more for level 5)     Review of Systems   Constitutional: Negative for activity change and appetite change. HENT: Negative for congestion, ear discharge, ear pain, nosebleeds, rhinorrhea and sore throat. Eyes: Negative for discharge. Respiratory: Negative for cough and shortness of breath. Cardiovascular: Negative for chest pain, palpitations and leg swelling. Gastrointestinal: Positive for nausea.  Negative for abdominal distention, abdominal pain, constipation and vomiting. Genitourinary: Negative for difficulty urinating, dysuria, frequency and urgency. Musculoskeletal: Negative for arthralgias, back pain and myalgias. Skin: Negative for color change, pallor, rash and wound. Neurological: Negative for dizziness, tremors, syncope, weakness, numbness and headaches. Psychiatric/Behavioral: Negative for agitation and confusion. Except as noted above the remainder of the review of systems was reviewed and negative. PAST MEDICAL HISTORY     Past Medical History:   Diagnosis Date    Anxiety     Cirrhosis ([facility] [address]     Coronary artery disease involving native coronary artery of native heart without angina pectoris 1/8/2018    HTN (hypertension)          SURGICALHISTORY       Past Surgical History:   Procedure Laterality Date    CORONARY ARTERY BYPASS GRAFT           CURRENT MEDICATIONS       Previous Medications    ACETAMINOPHEN (TYLENOL) 500 MG TABLET    Take 1,000 mg by mouth 2 times daily    AMLODIPINE (NORVASC) 5 MG TABLET    Take by mouth    ASPIRIN (ECOTRIN) 325 MG EC TABLET    Take 325 mg by mouth daily    ATENOLOL (TENORMIN) 25 MG TABLET    Take by mouth    ATORVASTATIN (LIPITOR) 20 MG TABLET    Take 20 mg by mouth nightly    CARVEDILOL (COREG) 6.25 MG TABLET    Take 1 tablet by mouth 2 times daily    DIAZEPAM (VALIUM) 2 MG TABLET    Take 5 mg by mouth 2 times daily.      FAMOTIDINE (PEPCID) 20 MG TABLET    Take by mouth    ONDANSETRON (ZOFRAN) 4 MG TABLET    Take 1 tablet by mouth every 12 hours as needed for Nausea    PROBIOTIC PRODUCT (PROBIOTIC DAILY PO)    Take 1 capsule by mouth    SERTRALINE (ZOLOFT) 50 MG TABLET    Take 1 tablet by mouth daily    VALSARTAN (DIOVAN) 320 MG TABLET    Take 320 mg by mouth daily    VITAMIN B-12 (CYANOCOBALAMIN) 1000 MCG TABLET    Take 2,000 mcg by mouth daily       ALLERGIES     Carbapenems, Cephalosporins, Hydralazine, and Pcn [penicillins]    FAMILY HISTORY     History reviewed. No pertinent family history. SOCIAL HISTORY       Social History     Socioeconomic History    Marital status:      Spouse name: None    Number of children: None    Years of education: None    Highest education level: None   Occupational History    None   Tobacco Use    Smoking status: Never Smoker    Smokeless tobacco: Never Used   Vaping Use    Vaping Use: Never used   Substance and Sexual Activity    Alcohol use: No    Drug use: No    Sexual activity: None   Other Topics Concern    None   Social History Narrative    None     Social Determinants of Health     Financial Resource Strain:     Difficulty of Paying Living Expenses:    Food Insecurity:     Worried About Running Out of Food in the Last Year:     Ran Out of Food in the Last Year:    Transportation Needs:     Lack of Transportation (Medical):  Lack of Transportation (Non-Medical):    Physical Activity:     Days of Exercise per Week:     Minutes of Exercise per Session:    Stress:     Feeling of Stress :    Social Connections:     Frequency of Communication with Friends and Family:     Frequency of Social Gatherings with Friends and Family:     Attends Shinto Services:     Active Member of Clubs or Organizations:     Attends Club or Organization Meetings:     Marital Status:    Intimate Partner Violence:     Fear of Current or Ex-Partner:     Emotionally Abused:     Physically Abused:     Sexually Abused:        SCREENINGS      @FLOW([id number])@      PHYSICAL EXAM    (up to 7 for level 4, 8 or more for level 5)     ED Triage Vitals   BP Temp Temp Source Pulse Resp SpO2 Height Weight   08/22/21 0820 08/22/21 0819 08/22/21 0819 08/22/21 0819 08/22/21 0819 08/22/21 0819 08/22/21 0819 08/22/21 0819   (!) 199/89 99 °F (37.2 °C) Oral 85 16 95 % 5' 1\" (1.549 m) 165 lb (74.8 kg)       Physical Exam  Vitals and nursing note reviewed. Constitutional:       General: She is not in acute distress. Appearance: Normal appearance. She is well-developed. She is not ill-appearing, toxic-appearing or diaphoretic. HENT:      Head: Normocephalic. Nose: No congestion. Mouth/Throat:      Mouth: Mucous membranes are moist.      Pharynx: No oropharyngeal exudate or posterior oropharyngeal erythema. Eyes:      Extraocular Movements: Extraocular movements intact. Conjunctiva/sclera: Conjunctivae normal.      Pupils: Pupils are equal, round, and reactive to light. Neck:      Vascular: No JVD. Trachea: No tracheal deviation. Cardiovascular:      Rate and Rhythm: Normal rate. Pulses: Normal pulses. Heart sounds: Normal heart sounds. No murmur heard. No friction rub. No gallop. Pulmonary:      Effort: Pulmonary effort is normal. No tachypnea, accessory muscle usage, respiratory distress or retractions. Breath sounds: No stridor. No wheezing, rhonchi or rales. Chest:      Chest wall: No tenderness. Abdominal:      General: Abdomen is flat. Bowel sounds are normal. There is no distension or abdominal bruit. Palpations: Abdomen is soft. There is no shifting dullness, fluid wave, hepatomegaly, splenomegaly, mass or pulsatile mass. Tenderness: There is no abdominal tenderness. There is no right CVA tenderness, left CVA tenderness, guarding or rebound. Negative signs include Perkins's sign, Rovsing's sign and McBurney's sign. Comments: Abdomen soft nondistended nontender guarding mass rebound, no CVA tenderness. Musculoskeletal:         General: No deformity. Cervical back: Normal range of motion and neck supple. No rigidity. Skin:     General: Skin is warm and dry. Capillary Refill: Capillary refill takes less than 2 seconds. Coloration: Skin is not jaundiced. Neurological:      General: No focal deficit present. Mental Status: She is alert and oriented to person, place, and time. Mental status is at baseline.       Cranial Nerves: No cranial at home which she states is not working very well for her. She appears in no acute distress. She is concerned that she has a secondary infection, wants to be evaluated. She states she was also seen by her regular family physician, and placed on famotidine for concerns of gastritis. Evaluation today shows no specific acute findings. Patient is very comfortable, no recurrent episodes of nausea or vomiting. She was given a prescription for Reglan at home, she was advised to continue the use of famotidine, as I believe that gastritis is most likely her cause of her problems at this time she was advised if she has any worsening or changes symptoms, she should return to the emerge department for reevaluation. She was also advised to contact her regular family physician for follow-up appointment in the next 48 to 72 hours. CRITICAL CARE TIME   Total Critical Care time was 0 minutes, excluding separately reportableprocedures. There was a high probability of clinicallysignificant/life threatening deterioration in the patient's condition which required my urgent intervention. CONSULTS:  None    PROCEDURES:  Unless otherwise noted below, none     Procedures    FINAL IMPRESSION      1. Nausea    2.  Acute gastritis without hemorrhage, unspecified gastritis type          DISPOSITION/PLAN   DISPOSITION Decision To Discharge 08/22/2021 10:41:46 AM      PATIENT REFERRED TO:  Chilango Villatoro DO  [address] [address]  [phone]            DISCHARGE MEDICATIONS:  New Prescriptions    METOCLOPRAMIDE (REGLAN) 10 MG TABLET    1 tablet every 6 hours as needed for nausea          (Please note that portions of this note were completed with a voice recognition program.  Efforts were made to edit the dictations but occasionally words are mis-transcribed.)    Chasity Gutierrez PA-C (electronically signed)  Attending Emergency Physician         Chasity Gutierrez PA-C  08/22/21 2839

## 2021-08-22 NOTE — ED NOTES
Tech assist patient up to bedside commode. Gait steady. Patient advised to press nurse call when finished.       Kala Tao  08/22/21 1616

## 2021-08-23 LAB
EKG ATRIAL RATE: 68 BPM
EKG P AXIS: 51 DEGREES
EKG P-R INTERVAL: 134 MS
EKG Q-T INTERVAL: 448 MS
EKG QRS DURATION: 136 MS
EKG QTC CALCULATION (BAZETT): 476 MS
EKG R AXIS: 47 DEGREES
EKG T AXIS: 27 DEGREES
EKG VENTRICULAR RATE: 68 BPM

## 2021-08-23 PROCEDURE — 93010 ELECTROCARDIOGRAM REPORT: CPT | Performed by: INTERNAL MEDICINE

## 2021-08-25 ENCOUNTER — HOSPITAL ENCOUNTER (EMERGENCY)
Age: 86
Discharge: HOME | End: 2021-08-25
Attending: EMERGENCY MEDICINE
Payer: MEDICARE

## 2021-08-25 VITALS
HEIGHT: 62 IN | SYSTOLIC BLOOD PRESSURE: 174 MMHG | WEIGHT: 168 LBS | RESPIRATION RATE: 15 BRPM | HEART RATE: 70 BPM | OXYGEN SATURATION: 93 % | DIASTOLIC BLOOD PRESSURE: 84 MMHG | TEMPERATURE: 98.5 F | BODY MASS INDEX: 30.91 KG/M2

## 2021-08-25 DIAGNOSIS — F41.1 ANXIETY STATE: ICD-10-CM

## 2021-08-25 DIAGNOSIS — I10 ESSENTIAL HYPERTENSION: Primary | ICD-10-CM

## 2021-08-25 PROCEDURE — 99285 EMERGENCY DEPT VISIT HI MDM: CPT

## 2021-08-25 PROCEDURE — 6370000000 HC RX 637 (ALT 250 FOR IP): Performed by: EMERGENCY MEDICINE

## 2021-08-25 RX ORDER — AMLODIPINE BESYLATE 5 MG/1
5 TABLET ORAL ONCE
Status: COMPLETED | OUTPATIENT
Start: 2021-08-25 | End: 2021-08-25

## 2021-08-25 RX ADMIN — AMLODIPINE BESYLATE 5 MG: 5 TABLET ORAL at 16:51

## 2021-08-25 ASSESSMENT — ENCOUNTER SYMPTOMS
SHORTNESS OF BREATH: 0
NAUSEA: 0
VOMITING: 0
CHEST TIGHTNESS: 0
EYE PAIN: 0
SORE THROAT: 0
ABDOMINAL PAIN: 0

## 2021-08-25 NOTE — ED NOTES
Bed: 09  Expected date:   Expected time:   Means of arrival:   Comments:  82 F - HTN     Mahesh Rae RN  08/25/21 [id number]

## 2021-08-25 NOTE — ED TRIAGE NOTES
Pt brought in by ems for hypertension. Pt states that she took her blood pressure meds as prescribed,  But her pressure is still high. Pt denies any cp/sob  Denies any other complaints. msp's intact  Pt a&ox4  resp equal and unlabored.   Breath sounds clear

## 2021-08-30 ENCOUNTER — OFFICE VISIT (OUTPATIENT)
Dept: CARDIOLOGY CLINIC | Age: 86
End: 2021-08-30
Payer: MEDICARE

## 2021-08-30 VITALS
SYSTOLIC BLOOD PRESSURE: 138 MMHG | DIASTOLIC BLOOD PRESSURE: 86 MMHG | HEIGHT: 62 IN | WEIGHT: 164 LBS | OXYGEN SATURATION: 97 % | HEART RATE: 75 BPM | BODY MASS INDEX: 30.18 KG/M2

## 2021-08-30 DIAGNOSIS — I25.10 CORONARY ARTERY DISEASE INVOLVING NATIVE CORONARY ARTERY OF NATIVE HEART WITHOUT ANGINA PECTORIS: Primary | ICD-10-CM

## 2021-08-30 PROCEDURE — 1036F TOBACCO NON-USER: CPT | Performed by: INTERNAL MEDICINE

## 2021-08-30 PROCEDURE — 1090F PRES/ABSN URINE INCON ASSESS: CPT | Performed by: INTERNAL MEDICINE

## 2021-08-30 PROCEDURE — 99213 OFFICE O/P EST LOW 20 MIN: CPT | Performed by: INTERNAL MEDICINE

## 2021-08-30 PROCEDURE — G8417 CALC BMI ABV UP PARAM F/U: HCPCS | Performed by: INTERNAL MEDICINE

## 2021-08-30 PROCEDURE — G8400 PT W/DXA NO RESULTS DOC: HCPCS | Performed by: INTERNAL MEDICINE

## 2021-08-30 PROCEDURE — G8427 DOCREV CUR MEDS BY ELIG CLIN: HCPCS | Performed by: INTERNAL MEDICINE

## 2021-08-30 PROCEDURE — 4040F PNEUMOC VAC/ADMIN/RCVD: CPT | Performed by: INTERNAL MEDICINE

## 2021-08-30 PROCEDURE — 1123F ACP DISCUSS/DSCN MKR DOCD: CPT | Performed by: INTERNAL MEDICINE

## 2021-08-30 RX ORDER — DIAZEPAM 5 MG/1
2.5 TABLET ORAL 2 TIMES DAILY
COMMUNITY
Start: 2021-07-21

## 2021-08-30 ASSESSMENT — ENCOUNTER SYMPTOMS
COLOR CHANGE: 0
APNEA: 0
SHORTNESS OF BREATH: 1
CHEST TIGHTNESS: 0

## 2021-08-30 NOTE — PROGRESS NOTES
[facility] CARDIOLOGY OFFICE FOLLOW-UP      Patient: Ortiz Garrido  YOB: 1935  MRN: [id number]    Chief Complaint:  Chief Complaint   Patient presents with    1 Month Follow-Up    Medication Check     did drop 2.5mg BBID    Palpitations    Dizziness    Anxiety         Subjective/HPI:  8/30/21: Patient presents today for follow-up of Coronary artery disease. Remote CABG 16 years ago. She had nausea vomiting. For which she is on Reglan and Zofran. She is continue taking Zoloft 50 mg daily. And Valium 2.5 twice daily. She sees . Anxiety is somewhat improved after Zoloft was started. Blood pressure is stable. To see me in 4 months.        6/28/21: Patient presents today for follow-up of her stress test that was negative for ischemia. LV eject fraction is 80%.  She had prior open heart surgery 15 years ago. Waltdanii Peterson sees Dr. Jovan Valdez is improved. Lina Peterson is on Coreg 6.25 mg twice daily, Lipitor 20 mg a day, and Diovan 320 mg.  No further chest pain.  See me in 1 month         Past Medical History:   Diagnosis Date    Anxiety     Cirrhosis ([address]     Coronary artery disease involving native coronary artery of native heart without angina pectoris 1/8/2018    HTN (hypertension)        Past Surgical History:   Procedure Laterality Date    CORONARY ARTERY BYPASS GRAFT         No family history on file.     Social History     Socioeconomic History    Marital status:      Spouse name: None    Number of children: None    Years of education: None    Highest education level: None   Occupational History    None   Tobacco Use    Smoking status: Never Smoker    Smokeless tobacco: Never Used   Vaping Use    Vaping Use: Never used   Substance and Sexual Activity    Alcohol use: No    Drug use: No    Sexual activity: None   Other Topics Concern    None   Social History Narrative    None     Social Determinants of Health     Financial Resource Strain:     Difficulty mouth daily      amLODIPine (NORVASC) 5 MG tablet Take by mouth (Patient not taking: Reported on 6/28/2021)      diazepam (VALIUM) 2 MG tablet Take 5 mg by mouth 2 times daily. (Patient not taking: Reported on 8/30/2021)       No current facility-administered medications for this visit. Review of Systems:   Review of Systems   Constitutional: Negative for appetite change, diaphoresis and fatigue. Respiratory: Positive for shortness of breath. Negative for apnea and chest tightness. Cardiovascular: Positive for palpitations. Negative for chest pain and leg swelling. Musculoskeletal: Negative for myalgias. Skin: Negative for color change, pallor, rash and wound. Neurological: Positive for dizziness. Negative for syncope, weakness, light-headedness, numbness and headaches. Hematological: Does not bruise/bleed easily. Psychiatric/Behavioral: Negative for agitation, behavioral problems and confusion. The patient is not nervous/anxious and is not hyperactive. Review of System is negative except for as mentioned above. Physical Examination:    /86 (Site: Right Upper Arm, Position: Sitting, Cuff Size: Medium Adult)   Pulse 75   Ht 5' 2\" (1.575 m)   Wt 164 lb (74.4 kg)   SpO2 97%   BMI 30.00 kg/m²    Physical Exam   Constitutional: She appears healthy. HENT:   Nose: Nose normal.   Mouth/Throat: Dentition is normal. Oropharynx is clear. Eyes: Pupils are equal, round, and reactive to light. Cardiovascular: Normal rate, regular rhythm, S1 normal, S2 normal, normal heart sounds, intact distal pulses and normal pulses. No extrasystoles are present. Exam reveals no gallop. No murmur heard. Pulmonary/Chest: Effort normal and breath sounds normal. She has no wheezes. She has no rales. She exhibits no tenderness. Abdominal: Soft. Bowel sounds are normal. She exhibits no distension and no mass. There is no splenomegaly or hepatomegaly. There is no abdominal tenderness. Musculoskeletal:         General: No tenderness, deformity or edema. Normal range of motion. Cervical back: Normal range of motion. Neurological: She is alert and oriented to person, place, and time. She has normal motor skills and normal reflexes. Gait normal.   Skin: Skin is warm and dry. Patient Active Problem List   Diagnosis    Abnormal finding on EKG    Anxiety disorder, unspecified    Ataxia, unspecified    Coronary artery disease involving native coronary artery of native heart without angina pectoris    Heart palpitations    TIA (transient ischemic attack)    Chest pain    Angina at rest [facility])           No orders of the defined types were placed in this encounter. No orders of the defined types were placed in this encounter. Assessment/Orders:       ICD-10-CM    1. Coronary artery disease involving native coronary artery of native heart without angina pectoris  I25.10        No orders of the defined types were placed in this encounter. There are no discontinued medications. No orders of the defined types were placed in this encounter. Plan:    Stay on same medications.     See me in 4 months         Electronically signed by: Juan M Contreras MD  8/30/2021 2:03 PM

## 2021-09-07 RX ORDER — CARVEDILOL 6.25 MG/1
6.25 TABLET ORAL 2 TIMES DAILY
Qty: 60 TABLET | Refills: 11 | Status: ON HOLD | OUTPATIENT
Start: 2021-09-07 | End: 2022-07-13 | Stop reason: SDUPTHER

## 2021-09-07 NOTE — PROGRESS NOTES
Verbal Order Dr. Christal Valdez for Coreg 6.25mg bid read back and the prescription called to the [address] [facility]     Next Visit Date:  Future Appointments   Date Time Provider Lilo Wellington   10/7/2021  1:30 PM Michael Seth MD [address]   12/13/2021 12:45 PM Enrrique Askew MD [facility]

## 2021-10-13 ENCOUNTER — HOSPITAL ENCOUNTER (OUTPATIENT)
Dept: LAB | Age: 86
Discharge: HOME | End: 2021-10-13
Payer: MEDICARE

## 2021-10-13 LAB
ALBUMIN SERPL-MCNC: 4.2 G/DL (ref 3.5–4.6)
ALP BLD-CCNC: 104 U/L (ref 40–130)
ALT SERPL-CCNC: 15 U/L (ref 0–33)
ANION GAP SERPL CALCULATED.3IONS-SCNC: 16 MEQ/L (ref 9–15)
AST SERPL-CCNC: 18 U/L (ref 0–35)
BASOPHILS ABSOLUTE: 0.1 K/UL (ref 0–0.2)
BASOPHILS RELATIVE PERCENT: 0.7 %
BILIRUB SERPL-MCNC: 0.5 MG/DL (ref 0.2–0.7)
BUN BLDV-MCNC: 11 MG/DL (ref 8–23)
CALCIUM SERPL-MCNC: 9.2 MG/DL (ref 8.5–9.9)
CHLORIDE BLD-SCNC: 102 MEQ/L (ref 95–107)
CHOLESTEROL, TOTAL: 150 MG/DL (ref 0–199)
CO2: 26 MEQ/L (ref 20–31)
CREAT SERPL-MCNC: 0.53 MG/DL (ref 0.5–0.9)
EOSINOPHILS ABSOLUTE: 0.1 K/UL (ref 0–0.7)
EOSINOPHILS RELATIVE PERCENT: 1.4 %
GFR AFRICAN AMERICAN: >60
GFR NON-AFRICAN AMERICAN: >60
GLOBULIN: 2.9 G/DL (ref 2.3–3.5)
GLUCOSE BLD-MCNC: 84 MG/DL (ref 70–99)
HCT VFR BLD CALC: 43.6 % (ref 37–47)
HDLC SERPL-MCNC: 47 MG/DL (ref 40–59)
HEMOGLOBIN: 14.5 G/DL (ref 12–16)
LDL CHOLESTEROL CALCULATED: 75 MG/DL (ref 0–129)
LYMPHOCYTES ABSOLUTE: 1.5 K/UL (ref 1–4.8)
LYMPHOCYTES RELATIVE PERCENT: 19.4 %
MCH RBC QN AUTO: 30.1 PG (ref 27–31.3)
MCHC RBC AUTO-ENTMCNC: 33.3 % (ref 33–37)
MCV RBC AUTO: 90.4 FL (ref 82–100)
MONOCYTES ABSOLUTE: 0.7 K/UL (ref 0.2–0.8)
MONOCYTES RELATIVE PERCENT: 9.2 %
NEUTROPHILS ABSOLUTE: 5.5 K/UL (ref 1.4–6.5)
NEUTROPHILS RELATIVE PERCENT: 69.3 %
PDW BLD-RTO: 13.6 % (ref 11.5–14.5)
PLATELET # BLD: 291 K/UL (ref 130–400)
POTASSIUM SERPL-SCNC: 3.6 MEQ/L (ref 3.4–4.9)
RBC # BLD: 4.82 M/UL (ref 4.2–5.4)
REASON FOR REJECTION: NORMAL
REJECTED TEST: NORMAL
SODIUM BLD-SCNC: 144 MEQ/L (ref 135–144)
TOTAL PROTEIN: 7.1 G/DL (ref 6.3–8)
TRIGL SERPL-MCNC: 142 MG/DL (ref 0–150)
TSH SERPL DL<=0.05 MIU/L-ACNC: 3.83 UIU/ML (ref 0.44–3.86)
WBC # BLD: 7.9 K/UL (ref 4.8–10.8)

## 2021-10-13 PROCEDURE — 81001 URINALYSIS AUTO W/SCOPE: CPT

## 2021-10-13 PROCEDURE — 84443 ASSAY THYROID STIM HORMONE: CPT

## 2021-10-13 PROCEDURE — 85025 COMPLETE CBC W/AUTO DIFF WBC: CPT

## 2021-10-13 PROCEDURE — 80053 COMPREHEN METABOLIC PANEL: CPT

## 2021-10-13 PROCEDURE — 80061 LIPID PANEL: CPT

## 2021-10-15 LAB
BILIRUBIN URINE: NEGATIVE
BLOOD, URINE: NEGATIVE
CLARITY: CLEAR
COLOR: YELLOW
GLUCOSE URINE: NEGATIVE MG/DL
KETONES, URINE: NEGATIVE MG/DL
LEUKOCYTE ESTERASE, URINE: ABNORMAL
NITRITE, URINE: POSITIVE
PH UA: 5.5 (ref 5–9)
PROTEIN UA: NEGATIVE MG/DL
SPECIFIC GRAVITY UA: 1.01 (ref 1–1.03)
UROBILINOGEN, URINE: 0.2 E.U./DL

## 2021-10-16 LAB
BACTERIA: ABNORMAL /HPF
EPITHELIAL CELLS, UA: ABNORMAL /HPF (ref 0–5)
HYALINE CASTS: ABNORMAL /HPF (ref 0–5)
WBC UA: ABNORMAL /HPF (ref 0–5)

## 2021-12-13 ENCOUNTER — OFFICE VISIT (OUTPATIENT)
Dept: CARDIOLOGY CLINIC | Age: 86
End: 2021-12-13
Payer: MEDICARE

## 2021-12-13 VITALS
BODY MASS INDEX: 28.89 KG/M2 | WEIGHT: 157 LBS | HEIGHT: 62 IN | DIASTOLIC BLOOD PRESSURE: 82 MMHG | HEART RATE: 73 BPM | SYSTOLIC BLOOD PRESSURE: 156 MMHG | OXYGEN SATURATION: 98 %

## 2021-12-13 DIAGNOSIS — R00.2 PALPITATIONS: ICD-10-CM

## 2021-12-13 DIAGNOSIS — I25.10 CORONARY ARTERY DISEASE INVOLVING NATIVE CORONARY ARTERY OF NATIVE HEART WITHOUT ANGINA PECTORIS: Primary | ICD-10-CM

## 2021-12-13 PROCEDURE — G8400 PT W/DXA NO RESULTS DOC: HCPCS | Performed by: INTERNAL MEDICINE

## 2021-12-13 PROCEDURE — G8417 CALC BMI ABV UP PARAM F/U: HCPCS | Performed by: INTERNAL MEDICINE

## 2021-12-13 PROCEDURE — 99214 OFFICE O/P EST MOD 30 MIN: CPT | Performed by: INTERNAL MEDICINE

## 2021-12-13 PROCEDURE — 4040F PNEUMOC VAC/ADMIN/RCVD: CPT | Performed by: INTERNAL MEDICINE

## 2021-12-13 PROCEDURE — 1123F ACP DISCUSS/DSCN MKR DOCD: CPT | Performed by: INTERNAL MEDICINE

## 2021-12-13 PROCEDURE — 1090F PRES/ABSN URINE INCON ASSESS: CPT | Performed by: INTERNAL MEDICINE

## 2021-12-13 PROCEDURE — G8484 FLU IMMUNIZE NO ADMIN: HCPCS | Performed by: INTERNAL MEDICINE

## 2021-12-13 PROCEDURE — 1036F TOBACCO NON-USER: CPT | Performed by: INTERNAL MEDICINE

## 2021-12-13 PROCEDURE — G8427 DOCREV CUR MEDS BY ELIG CLIN: HCPCS | Performed by: INTERNAL MEDICINE

## 2021-12-13 ASSESSMENT — ENCOUNTER SYMPTOMS
APNEA: 0
SHORTNESS OF BREATH: 0
CHEST TIGHTNESS: 0
COLOR CHANGE: 0

## 2021-12-13 NOTE — PROGRESS NOTES
[facility] CARDIOLOGY OFFICE FOLLOW-UP      Patient: Itzel Ortiz  YOB: 1935  MRN: [id number]    Chief Complaint:  Chief Complaint   Patient presents with    Follow-up     4 month     Coronary Artery Disease    Palpitations    Dizziness         Subjective/HPI:  12/13/21: Patient presents today for up of coronary artery disease. Status post CABG at the [facility] 19 years ago. She used to be on high dose of Valium which we have been able to reduce. I have advised her to cut down the Valium 5 mg to 1/4 pills and take it twice a day to be in the 1.25 mg twice daily hopefully she can stop it and use it only as needed. Continue with the Zoloft. She denies any chest pain congestive heart failure. She does not want to get the Covid vaccine no ankle edema no congestive heart failure symptoms no syncope       8/30/21: Patient presents today for follow-up of Coronary artery disease. Remote CABG 16 years ago. She had nausea vomiting. For which she is on Reglan and Zofran. She is continue taking Zoloft 50 mg daily. And Valium 2.5 twice daily. She sees . Anxiety is somewhat improved after Zoloft was started. Blood pressure is stable. To see me in 4 months.        6/28/21: Patient presents today for follow-up of her stress test that was negative for ischemia. LV eject fraction is 80%. She had prior open heart surgery 15 years ago. She sees Dr. Robson Newman. Her pain is improved. She is on Coreg 6.25 mg twice daily, Lipitor 20 mg a day, and Diovan 320 mg. No further chest pain. See me in 1 month       Past Medical History:   Diagnosis Date    Anxiety     Cirrhosis ([address]     Coronary artery disease involving native coronary artery of native heart without angina pectoris 1/8/2018    HTN (hypertension)        Past Surgical History:   Procedure Laterality Date    CORONARY ARTERY BYPASS GRAFT         History reviewed. No pertinent family history.     Social History Socioeconomic History    Marital status:      Spouse name: None    Number of children: None    Years of education: None    Highest education level: None   Occupational History    None   Tobacco Use    Smoking status: Never Smoker    Smokeless tobacco: Never Used   Vaping Use    Vaping Use: Never used   Substance and Sexual Activity    Alcohol use: No    Drug use: No    Sexual activity: None   Other Topics Concern    None   Social History Narrative    None     Social Determinants of Health     Financial Resource Strain:     Difficulty of Paying Living Expenses: Not on file   Food Insecurity:     Worried About Running Out of Food in the Last Year: Not on file    Olga of Food in the Last Year: Not on file   Transportation Needs:     Lack of Transportation (Medical): Not on file    Lack of Transportation (Non-Medical):  Not on file   Physical Activity:     Days of Exercise per Week: Not on file    Minutes of Exercise per Session: Not on file   Stress:     Feeling of Stress : Not on file   Social Connections:     Frequency of Communication with Friends and Family: Not on file    Frequency of Social Gatherings with Friends and Family: Not on file    Attends Denominational Services: Not on file    Active Member of [address] [place] [address] or Organizations: Not on file    Attends Club or Organization Meetings: Not on file    Marital Status: Not on file   Intimate Partner Violence:     Fear of Current or Ex-Partner: Not on file    Emotionally Abused: Not on file    Physically Abused: Not on file    Sexually Abused: Not on file   Housing Stability:     Unable to Pay for Housing in the Last Year: Not on file    Number of [place] in the Last Year: Not on file    Unstable Housing in the Last Year: Not on file       Allergies   Allergen Reactions    Carbapenems     Cephalosporins     Hydralazine Other (See Comments)    Pcn [Penicillins]        Current Outpatient Medications   Medication Sig Dispense Refill    carvedilol (COREG) 6.25 MG tablet Take 1 tablet by mouth 2 times daily 60 tablet 11    diazePAM (VALIUM) 5 MG tablet Take 2.5 mg by mouth 2 times daily.  metoclopramide (REGLAN) 10 MG tablet 1 tablet every 6 hours as needed for nausea 28 tablet 0    ondansetron (ZOFRAN) 4 MG tablet Take 1 tablet by mouth every 12 hours as needed for Nausea 10 tablet 0    amLODIPine (NORVASC) 5 MG tablet Take by mouth       atenolol (TENORMIN) 25 MG tablet Take by mouth      famotidine (PEPCID) 20 MG tablet Take by mouth      sertraline (ZOLOFT) 50 MG tablet Take 1 tablet by mouth daily 30 tablet 0    acetaminophen (TYLENOL) 500 MG tablet Take 1,000 mg by mouth 2 times daily      valsartan (DIOVAN) 320 MG tablet Take 320 mg by mouth daily      atorvastatin (LIPITOR) 20 MG tablet Take 20 mg by mouth nightly      vitamin B-12 (CYANOCOBALAMIN) 1000 MCG tablet Take 2,000 mcg by mouth daily      Probiotic Product (PROBIOTIC DAILY PO) Take 1 capsule by mouth      aspirin (ECOTRIN) 325 MG EC tablet Take 325 mg by mouth daily       No current facility-administered medications for this visit. Review of Systems:   Review of Systems   Constitutional: Negative for appetite change, diaphoresis and fatigue. Respiratory: Negative for apnea, chest tightness and shortness of breath. Cardiovascular: Negative for chest pain, palpitations and leg swelling. Skin: Negative for color change, pallor, rash and wound. Neurological: Negative for dizziness, syncope, weakness, light-headedness and headaches. Psychiatric/Behavioral: Negative for agitation, behavioral problems and confusion. The patient is not nervous/anxious and is not hyperactive. Review of System is negative except for as mentioned above.       Physical Examination:    BP (!) 156/82 (Site: Right Upper Arm, Position: Sitting, Cuff Size: Large Adult)   Pulse 73   Ht 5' 2\" (1.575 m)   Wt 157 lb (71.2 kg)   SpO2 98%   BMI 28.72 kg/m² Physical Exam   Constitutional: She appears healthy. HENT:   Nose: Nose normal.   Mouth/Throat: Dentition is normal. Oropharynx is clear. Eyes: Pupils are equal, round, and reactive to light. Cardiovascular: Normal rate, regular rhythm, S1 normal, S2 normal, normal heart sounds, intact distal pulses and normal pulses. No extrasystoles are present. Exam reveals no gallop. No murmur heard. Pulmonary/Chest: Effort normal and breath sounds normal. She has no wheezes. She has no rales. She exhibits no tenderness. Abdominal: Soft. Bowel sounds are normal. She exhibits no distension and no mass. There is no splenomegaly or hepatomegaly. There is no abdominal tenderness. Musculoskeletal:         General: No tenderness, deformity or edema. Normal range of motion. Cervical back: Normal range of motion. Neurological: She is alert and oriented to person, place, and time. She has normal motor skills and normal reflexes. Gait normal.   Skin: Skin is warm and dry. Patient Active Problem List   Diagnosis    Abnormal finding on EKG    Anxiety disorder, unspecified    Ataxia, unspecified    Coronary artery disease involving native coronary artery of native heart without angina pectoris    Heart palpitations    TIA (transient ischemic attack)    Chest pain    Angina at rest [facility])         Assessment/Orders:   Status post CABG  TIA  Anxiety disorder  Long-term use of Valium have been able to reduce it significantly  Obesity          Plan:  Advised to cut down the dose of Valium to 1.25 twice daily continue with the Zoloft 50 mg a day. And I told her to discontinue Valium altogether in a few weeks and just take it as needed. Continue with the Zoloft    Stay on same medications.     See me in [facility]        Electronically signed by: Tonya Flores MD  12/13/2021 2:47 PM

## 2022-07-10 ENCOUNTER — APPOINTMENT (OUTPATIENT)
Dept: GENERAL RADIOLOGY | Age: 87
DRG: 071 | End: 2022-07-10
Payer: MEDICARE

## 2022-07-10 ENCOUNTER — HOSPITAL ENCOUNTER (INPATIENT)
Age: 87
LOS: 3 days | Discharge: HOME HEALTH | DRG: 071 | End: 2022-07-13
Attending: INTERNAL MEDICINE | Admitting: INTERNAL MEDICINE
Payer: MEDICARE

## 2022-07-10 ENCOUNTER — APPOINTMENT (OUTPATIENT)
Dept: CT IMAGING | Age: 87
DRG: 071 | End: 2022-07-10
Payer: MEDICARE

## 2022-07-10 DIAGNOSIS — R41.0 CONFUSION AND DISORIENTATION: Primary | ICD-10-CM

## 2022-07-10 PROBLEM — R41.82 CHANGE IN MENTAL STATE: Status: ACTIVE | Noted: 2022-07-10

## 2022-07-10 LAB
ALBUMIN SERPL-MCNC: 4 G/DL (ref 3.5–4.6)
ALP BLD-CCNC: 87 U/L (ref 40–130)
ALT SERPL-CCNC: 8 U/L (ref 0–33)
AMMONIA: 21 UMOL/L (ref 11–51)
AMPHETAMINE SCREEN, URINE: ABNORMAL
ANION GAP SERPL CALCULATED.3IONS-SCNC: 13 MEQ/L (ref 9–15)
APTT: 24.3 SEC (ref 24.4–36.8)
AST SERPL-CCNC: 13 U/L (ref 0–35)
BARBITURATE SCREEN URINE: ABNORMAL
BASOPHILS ABSOLUTE: 0.1 K/UL (ref 0–0.2)
BASOPHILS RELATIVE PERCENT: 0.7 %
BENZODIAZEPINE SCREEN, URINE: POSITIVE
BILIRUB SERPL-MCNC: 0.4 MG/DL (ref 0.2–0.7)
BILIRUBIN URINE: NEGATIVE
BLOOD, URINE: NEGATIVE
BUN BLDV-MCNC: 47 MG/DL (ref 8–23)
CALCIUM SERPL-MCNC: 9.3 MG/DL (ref 8.5–9.9)
CANNABINOID SCREEN URINE: ABNORMAL
CHLORIDE BLD-SCNC: 98 MEQ/L (ref 95–107)
CLARITY: CLEAR
CO2: 26 MEQ/L (ref 20–31)
COCAINE METABOLITE SCREEN URINE: ABNORMAL
COLOR: YELLOW
CREAT SERPL-MCNC: 1.37 MG/DL (ref 0.5–0.9)
EOSINOPHILS ABSOLUTE: 0.1 K/UL (ref 0–0.7)
EOSINOPHILS RELATIVE PERCENT: 0.6 %
ETHANOL PERCENT: NORMAL G/DL
ETHANOL: <10 MG/DL (ref 0–0.08)
FENTANYL SCREEN, URINE: ABNORMAL
GFR AFRICAN AMERICAN: 44.3
GFR NON-AFRICAN AMERICAN: 36.6
GLOBULIN: 3.3 G/DL (ref 2.3–3.5)
GLUCOSE BLD-MCNC: 111 MG/DL (ref 70–99)
GLUCOSE BLD-MCNC: 116 MG/DL (ref 70–99)
GLUCOSE URINE: NEGATIVE MG/DL
HCT VFR BLD CALC: 39.4 % (ref 37–47)
HEMOGLOBIN: 13 G/DL (ref 12–16)
INR BLD: 1.1
KETONES, URINE: NEGATIVE MG/DL
LACTIC ACID: 1.4 MMOL/L (ref 0.5–2.2)
LEUKOCYTE ESTERASE, URINE: NEGATIVE
LYMPHOCYTES ABSOLUTE: 2.6 K/UL (ref 1–4.8)
LYMPHOCYTES RELATIVE PERCENT: 26.2 %
Lab: ABNORMAL
MCH RBC QN AUTO: 29.4 PG (ref 27–31.3)
MCHC RBC AUTO-ENTMCNC: 32.9 % (ref 33–37)
MCV RBC AUTO: 89.5 FL (ref 82–100)
METHADONE SCREEN, URINE: ABNORMAL
MONOCYTES ABSOLUTE: 1.3 K/UL (ref 0.2–0.8)
MONOCYTES RELATIVE PERCENT: 13.2 %
NEUTROPHILS ABSOLUTE: 5.8 K/UL (ref 1.4–6.5)
NEUTROPHILS RELATIVE PERCENT: 59.3 %
NITRITE, URINE: NEGATIVE
OPIATE SCREEN URINE: ABNORMAL
OXYCODONE URINE: ABNORMAL
PDW BLD-RTO: 14.5 % (ref 11.5–14.5)
PERFORMED ON: ABNORMAL
PH UA: 5 (ref 5–9)
PHENCYCLIDINE SCREEN URINE: ABNORMAL
PLATELET # BLD: 280 K/UL (ref 130–400)
POTASSIUM SERPL-SCNC: 3.4 MEQ/L (ref 3.4–4.9)
PROPOXYPHENE SCREEN: ABNORMAL
PROTEIN UA: ABNORMAL MG/DL
PROTHROMBIN TIME: 14.3 SEC (ref 12.3–14.9)
RBC # BLD: 4.4 M/UL (ref 4.2–5.4)
SODIUM BLD-SCNC: 137 MEQ/L (ref 135–144)
SPECIFIC GRAVITY UA: 1.04 (ref 1–1.03)
TOTAL PROTEIN: 7.3 G/DL (ref 6.3–8)
TROPONIN: <0.01 NG/ML (ref 0–0.01)
URINE REFLEX TO CULTURE: ABNORMAL
UROBILINOGEN, URINE: 0.2 E.U./DL
WBC # BLD: 9.8 K/UL (ref 4.8–10.8)

## 2022-07-10 PROCEDURE — 71045 X-RAY EXAM CHEST 1 VIEW: CPT

## 2022-07-10 PROCEDURE — 6370000000 HC RX 637 (ALT 250 FOR IP): Performed by: INTERNAL MEDICINE

## 2022-07-10 PROCEDURE — 36415 COLL VENOUS BLD VENIPUNCTURE: CPT

## 2022-07-10 PROCEDURE — 99285 EMERGENCY DEPT VISIT HI MDM: CPT

## 2022-07-10 PROCEDURE — 80053 COMPREHEN METABOLIC PANEL: CPT

## 2022-07-10 PROCEDURE — 82077 ASSAY SPEC XCP UR&BREATH IA: CPT

## 2022-07-10 PROCEDURE — 84484 ASSAY OF TROPONIN QUANT: CPT

## 2022-07-10 PROCEDURE — 70496 CT ANGIOGRAPHY HEAD: CPT

## 2022-07-10 PROCEDURE — 6360000004 HC RX CONTRAST MEDICATION: Performed by: STUDENT IN AN ORGANIZED HEALTH CARE EDUCATION/TRAINING PROGRAM

## 2022-07-10 PROCEDURE — 82140 ASSAY OF AMMONIA: CPT

## 2022-07-10 PROCEDURE — 2580000003 HC RX 258: Performed by: STUDENT IN AN ORGANIZED HEALTH CARE EDUCATION/TRAINING PROGRAM

## 2022-07-10 PROCEDURE — 2580000003 HC RX 258: Performed by: INTERNAL MEDICINE

## 2022-07-10 PROCEDURE — 85610 PROTHROMBIN TIME: CPT

## 2022-07-10 PROCEDURE — 70450 CT HEAD/BRAIN W/O DYE: CPT

## 2022-07-10 PROCEDURE — 93005 ELECTROCARDIOGRAM TRACING: CPT | Performed by: STUDENT IN AN ORGANIZED HEALTH CARE EDUCATION/TRAINING PROGRAM

## 2022-07-10 PROCEDURE — 6370000000 HC RX 637 (ALT 250 FOR IP): Performed by: STUDENT IN AN ORGANIZED HEALTH CARE EDUCATION/TRAINING PROGRAM

## 2022-07-10 PROCEDURE — 70498 CT ANGIOGRAPHY NECK: CPT

## 2022-07-10 PROCEDURE — 85025 COMPLETE CBC W/AUTO DIFF WBC: CPT

## 2022-07-10 PROCEDURE — 85730 THROMBOPLASTIN TIME PARTIAL: CPT

## 2022-07-10 PROCEDURE — 6360000002 HC RX W HCPCS: Performed by: INTERNAL MEDICINE

## 2022-07-10 PROCEDURE — 80307 DRUG TEST PRSMV CHEM ANLYZR: CPT

## 2022-07-10 PROCEDURE — 1210000000 HC MED SURG R&B

## 2022-07-10 PROCEDURE — 81003 URINALYSIS AUTO W/O SCOPE: CPT

## 2022-07-10 PROCEDURE — 83605 ASSAY OF LACTIC ACID: CPT

## 2022-07-10 RX ORDER — SODIUM CHLORIDE 0.9 % (FLUSH) 0.9 %
5-40 SYRINGE (ML) INJECTION EVERY 12 HOURS SCHEDULED
Status: DISCONTINUED | OUTPATIENT
Start: 2022-07-10 | End: 2022-07-13 | Stop reason: HOSPADM

## 2022-07-10 RX ORDER — POTASSIUM CHLORIDE 7.45 MG/ML
10 INJECTION INTRAVENOUS PRN
Status: DISCONTINUED | OUTPATIENT
Start: 2022-07-10 | End: 2022-07-13 | Stop reason: HOSPADM

## 2022-07-10 RX ORDER — DIAZEPAM 2 MG/1
2.5 TABLET ORAL EVERY 8 HOURS PRN
Status: DISCONTINUED | OUTPATIENT
Start: 2022-07-10 | End: 2022-07-13 | Stop reason: HOSPADM

## 2022-07-10 RX ORDER — SODIUM CHLORIDE 9 MG/ML
INJECTION, SOLUTION INTRAVENOUS CONTINUOUS
Status: DISCONTINUED | OUTPATIENT
Start: 2022-07-10 | End: 2022-07-12

## 2022-07-10 RX ORDER — ONDANSETRON 4 MG/1
4 TABLET, ORALLY DISINTEGRATING ORAL EVERY 8 HOURS PRN
Status: DISCONTINUED | OUTPATIENT
Start: 2022-07-10 | End: 2022-07-10

## 2022-07-10 RX ORDER — LEVOFLOXACIN 5 MG/ML
750 INJECTION, SOLUTION INTRAVENOUS
Status: DISCONTINUED | OUTPATIENT
Start: 2022-07-10 | End: 2022-07-11

## 2022-07-10 RX ORDER — VALSARTAN 160 MG/1
160 TABLET ORAL DAILY
Status: DISCONTINUED | OUTPATIENT
Start: 2022-07-10 | End: 2022-07-12

## 2022-07-10 RX ORDER — SODIUM CHLORIDE 9 MG/ML
INJECTION, SOLUTION INTRAVENOUS PRN
Status: DISCONTINUED | OUTPATIENT
Start: 2022-07-10 | End: 2022-07-13 | Stop reason: HOSPADM

## 2022-07-10 RX ORDER — ACETAMINOPHEN 325 MG/1
650 TABLET ORAL EVERY 6 HOURS PRN
Status: DISCONTINUED | OUTPATIENT
Start: 2022-07-10 | End: 2022-07-13 | Stop reason: HOSPADM

## 2022-07-10 RX ORDER — ONDANSETRON 2 MG/ML
4 INJECTION INTRAMUSCULAR; INTRAVENOUS EVERY 6 HOURS PRN
Status: DISCONTINUED | OUTPATIENT
Start: 2022-07-10 | End: 2022-07-13 | Stop reason: HOSPADM

## 2022-07-10 RX ORDER — ONDANSETRON 4 MG/1
4 TABLET, FILM COATED ORAL EVERY 12 HOURS PRN
Status: DISCONTINUED | OUTPATIENT
Start: 2022-07-10 | End: 2022-07-13 | Stop reason: HOSPADM

## 2022-07-10 RX ORDER — ENOXAPARIN SODIUM 100 MG/ML
30 INJECTION SUBCUTANEOUS DAILY
Status: DISCONTINUED | OUTPATIENT
Start: 2022-07-10 | End: 2022-07-11

## 2022-07-10 RX ORDER — SODIUM CHLORIDE 0.9 % (FLUSH) 0.9 %
5-40 SYRINGE (ML) INJECTION PRN
Status: DISCONTINUED | OUTPATIENT
Start: 2022-07-10 | End: 2022-07-13 | Stop reason: HOSPADM

## 2022-07-10 RX ORDER — ASPIRIN 325 MG
325 TABLET ORAL DAILY
Status: DISCONTINUED | OUTPATIENT
Start: 2022-07-11 | End: 2022-07-13 | Stop reason: HOSPADM

## 2022-07-10 RX ORDER — 0.9 % SODIUM CHLORIDE 0.9 %
1000 INTRAVENOUS SOLUTION INTRAVENOUS ONCE
Status: COMPLETED | OUTPATIENT
Start: 2022-07-10 | End: 2022-07-10

## 2022-07-10 RX ORDER — ATORVASTATIN CALCIUM 40 MG/1
40 TABLET, FILM COATED ORAL NIGHTLY
Status: DISCONTINUED | OUTPATIENT
Start: 2022-07-10 | End: 2022-07-10

## 2022-07-10 RX ORDER — POTASSIUM CHLORIDE 20 MEQ/1
40 TABLET, EXTENDED RELEASE ORAL PRN
Status: DISCONTINUED | OUTPATIENT
Start: 2022-07-10 | End: 2022-07-13 | Stop reason: HOSPADM

## 2022-07-10 RX ORDER — ACETAMINOPHEN 650 MG/1
650 SUPPOSITORY RECTAL EVERY 6 HOURS PRN
Status: DISCONTINUED | OUTPATIENT
Start: 2022-07-10 | End: 2022-07-13 | Stop reason: HOSPADM

## 2022-07-10 RX ORDER — NITROGLYCERIN 0.4 MG/1
0.4 TABLET SUBLINGUAL EVERY 5 MIN PRN
Status: DISCONTINUED | OUTPATIENT
Start: 2022-07-10 | End: 2022-07-13 | Stop reason: HOSPADM

## 2022-07-10 RX ORDER — FAMOTIDINE 20 MG/1
20 TABLET, FILM COATED ORAL DAILY
Status: DISCONTINUED | OUTPATIENT
Start: 2022-07-10 | End: 2022-07-13 | Stop reason: HOSPADM

## 2022-07-10 RX ORDER — POLYETHYLENE GLYCOL 3350 17 G/17G
17 POWDER, FOR SOLUTION ORAL DAILY PRN
Status: DISCONTINUED | OUTPATIENT
Start: 2022-07-10 | End: 2022-07-13 | Stop reason: HOSPADM

## 2022-07-10 RX ORDER — CARVEDILOL 3.12 MG/1
6.25 TABLET ORAL 2 TIMES DAILY
Status: DISCONTINUED | OUTPATIENT
Start: 2022-07-10 | End: 2022-07-13

## 2022-07-10 RX ORDER — ATORVASTATIN CALCIUM 20 MG/1
20 TABLET, FILM COATED ORAL NIGHTLY
Status: DISCONTINUED | OUTPATIENT
Start: 2022-07-10 | End: 2022-07-13 | Stop reason: HOSPADM

## 2022-07-10 RX ORDER — ASPIRIN 325 MG
325 TABLET ORAL ONCE
Status: COMPLETED | OUTPATIENT
Start: 2022-07-10 | End: 2022-07-10

## 2022-07-10 RX ADMIN — IOPAMIDOL 100 ML: 612 INJECTION, SOLUTION INTRAVENOUS at 16:33

## 2022-07-10 RX ADMIN — ASPIRIN 325 MG: 325 TABLET ORAL at 18:11

## 2022-07-10 RX ADMIN — NITROGLYCERIN 1 INCH: 20 OINTMENT TOPICAL at 23:52

## 2022-07-10 RX ADMIN — FAMOTIDINE 20 MG: 20 TABLET, FILM COATED ORAL at 21:54

## 2022-07-10 RX ADMIN — SODIUM CHLORIDE: 9 INJECTION, SOLUTION INTRAVENOUS at 21:58

## 2022-07-10 RX ADMIN — VALSARTAN 160 MG: 160 TABLET, FILM COATED ORAL at 18:54

## 2022-07-10 RX ADMIN — SERTRALINE HYDROCHLORIDE 50 MG: 50 TABLET ORAL at 21:54

## 2022-07-10 RX ADMIN — LEVOFLOXACIN 750 MG: 5 INJECTION, SOLUTION INTRAVENOUS at 22:01

## 2022-07-10 RX ADMIN — ENOXAPARIN SODIUM 30 MG: 100 INJECTION SUBCUTANEOUS at 21:54

## 2022-07-10 RX ADMIN — SODIUM CHLORIDE 1000 ML: 9 INJECTION, SOLUTION INTRAVENOUS at 16:54

## 2022-07-10 RX ADMIN — CARVEDILOL 6.25 MG: 3.12 TABLET, FILM COATED ORAL at 21:54

## 2022-07-10 RX ADMIN — ATORVASTATIN CALCIUM 20 MG: 20 TABLET, FILM COATED ORAL at 21:54

## 2022-07-10 ASSESSMENT — PAIN - FUNCTIONAL ASSESSMENT: PAIN_FUNCTIONAL_ASSESSMENT: NONE - DENIES PAIN

## 2022-07-10 NOTE — ED PROVIDER NOTES
[facility] ED  eMERGENCYdEPARTMENT eNCOUnter      Pt Name: Chana Bonilla  MRN: [id number]  Samirgfdeisy 1935  Date of evaluation: 7/10/2022  Provider:Pablo Rasheed PA-C    CHIEF COMPLAINT           HPI  Chana Bonilla is a 82 y.o. female per chart review has a h/o anxiety, TIA, CAD presenting with altered mental status. Family reports unknown onset, severe, debilitating confusion that was first noticed at 1 PM yesterday by family member. They state they saw the patient today and she was completely altered, cannot have a conversation like she was having just 1 week prior. Patient is ANO x0, unable to give her name. Patient is able to follow basic commands with repeated attempts at communication. Patient has not taking any blood thinners. Unable to provide a good history review of systems due to acuity. Nurse states that the patient said that everything was upside down. Patient has not changed medications recently, does take Valium but has been taking this for the last 50 years. ROS  Review of Systems   Unable to perform ROS: Mental status change       Except as noted above the remainder of the review of systems was reviewed and negative.        PAST MEDICAL HISTORY     Past Medical History:   Diagnosis Date    Anxiety     Cirrhosis ([facility] [address]     Coronary artery disease involving native coronary artery of native heart without angina pectoris 1/8/2018    HTN (hypertension)          SURGICAL HISTORY       Past Surgical History:   Procedure Laterality Date    CORONARY ARTERY BYPASS GRAFT           CURRENTMEDICATIONS       Previous Medications    ACETAMINOPHEN (TYLENOL) 500 MG TABLET    Take 1,000 mg by mouth 2 times daily    ASPIRIN (ECOTRIN) 325 MG EC TABLET    Take 325 mg by mouth daily    ATORVASTATIN (LIPITOR) 20 MG TABLET    Take 20 mg by mouth nightly    CARVEDILOL (COREG) 6.25 MG TABLET    Take 1 tablet by mouth 2 times daily    DIAZEPAM (VALIUM) 5 MG TABLET    Take 2.5 mg by Violence:     Fear of Current or Ex-Partner: Not on file    Emotionally Abused: Not on file    Physically Abused: Not on file    Sexually Abused: Not on file   Housing Stability:     Unable to Pay for Housing in the Last Year: Not on file    Number of Places Lived in the Last Year: Not on file    Unstable Housing in the Last Year: Not on file         PHYSICAL EXAM       ED Triage Vitals [07/10/22 1557]   BP Temp Temp Source Heart Rate Resp SpO2 Height Weight   (!) 155/56 -- Oral 80 18 95 % 5' 2\" (1.575 m) 165 lb (74.8 kg)       Physical Exam  Constitutional:       Appearance: Normal appearance. HENT:      Head: Normocephalic and atraumatic. Right Ear: External ear normal.      Left Ear: External ear normal.      Nose: Nose normal.      Mouth/Throat:      Mouth: Mucous membranes are moist.   Eyes:      Extraocular Movements: Extraocular movements intact. Conjunctiva/sclera: Conjunctivae normal.   Cardiovascular:      Rate and Rhythm: Normal rate and regular rhythm. Heart sounds: Normal heart sounds. Pulmonary:      Effort: Pulmonary effort is normal.      Breath sounds: Normal breath sounds. No stridor. No wheezing or rhonchi. Abdominal:      Palpations: Abdomen is soft. Tenderness: There is no abdominal tenderness. Musculoskeletal:         General: Normal range of motion. Cervical back: Normal range of motion and neck supple. No tenderness. Skin:     General: Skin is warm and dry. Neurological:      General: No focal deficit present. Mental Status: She is alert and oriented to person, place, and time. GCS: GCS eye subscore is 4. GCS verbal subscore is 4. GCS motor subscore is 6. Sensory: Sensation is intact. Motor: Motor function is intact. Coordination: Romberg sign negative.  Finger-Nose-Finger Test normal.      Comments: Neuro exam difficult due to patient not being good at following direction, eventually got her to touch her finger-to-nose some to my finger twice but she became confused in between each attempt. Good  strength in both hands    Patient able to lift both legs with good strength. Psychiatric:         Mood and Affect: Mood normal.         Behavior: Behavior normal.           MDM  This is an 72-year-old female presenting with altered mental status. Patient is afebrile and hemodynamically stable. Patient given IV 1 L normal saline. CT head negative for bleed. Labs notable for creatinine 1.37. Urine pending. Other labs unremarkable. Patient is out of tPA window, spoke with Dr. Ragini Dey who recommends aspirin and admission. Spoke with hospitalist who agrees to except patient for admission. FINAL IMPRESSION      1.  Confusion and disorientation          DISPOSITION/PLAN   DISPOSITION Decision To Admit 07/10/2022 05:12:30 PM        DISCHARGE MEDICATIONS:  [unfilled]         Gustavo Vanegas PA-C(electronically signed)  Attending Emergency Physician           Gustavo Vanegas PA-C  07/12/22 0162

## 2022-07-10 NOTE — ED TRIAGE NOTES
Family states that that she had a slight mental status change that started yesterday and has progressed. She states that her mind is backwards and can think. That when she reads and sees things jumbled. Words look out of order when she reads. She is fluent in [place] and [place] and is not understanding Welsh.

## 2022-07-10 NOTE — H&P
Hospital Medicine History & Physical      PCP: Erum Hurt DO    Date of Admission: 7/10/2022    Date of Service: 7/10/22      Chief Complaint:  Change in mental status      History Of Present Illness:  82 y.o. female who presented to [id number] [facility] with above complains. Per family report she was doing fine at home until yesterday AM when she became confused, her speech changed, she wasn't able communicate in [address] and was talking Swedish only. Her condition was progressively getting worse and today she was brought to ER. History was taking from family members at bedside since patient remained confused/disoriented. Denied fever, dyspnea, CP     Past Medical History:          Diagnosis Date    Anxiety     Cirrhosis ([facility] [address]     Coronary artery disease involving native coronary artery of native heart without angina pectoris 1/8/2018    HTN (hypertension)        Past Surgical History:          Procedure Laterality Date    CORONARY ARTERY BYPASS GRAFT         Medications Prior to Admission:      Prior to Admission medications    Medication Sig Start Date End Date Taking? Authorizing Provider   carvedilol (COREG) 6.25 MG tablet Take 1 tablet by mouth 2 times daily 9/7/21   Alistair Badillo MD   diazePAM (VALIUM) 5 MG tablet Take 2.5 mg by mouth 2 times daily.   7/21/21   Historical Provider, MD   ondansetron (ZOFRAN) 4 MG tablet Take 1 tablet by mouth every 12 hours as needed for Nausea 8/9/21   Maude Escamilla PA-C   famotidine (PEPCID) 20 MG tablet Take by mouth 7/8/20   Historical Provider, MD   sertraline (ZOLOFT) 50 MG tablet Take 1 tablet by mouth daily 6/28/21   Alistair Badillo MD   acetaminophen (TYLENOL) 500 MG tablet Take 1,000 mg by mouth 2 times daily    Historical Provider, MD   atorvastatin (LIPITOR) 20 MG tablet Take 20 mg by mouth nightly    Historical Provider, MD   Probiotic Product (PROBIOTIC DAILY PO) Take 1 capsule by mouth    Historical Provider, MD   aspirin (ECOTRIN) 325 MG EC tablet Take 325 mg by mouth daily 5/14/01   Historical Provider, MD       Allergies:  Carbapenems, Cephalosporins, Hydralazine, and Pcn [penicillins]    Social History:      The patient currently lives home    TOBACCO:   reports that she has never smoked. She has never used smokeless tobacco.  ETOH:   reports no history of alcohol use. Family History:       Reviewed in detail and negative for DM, CAD, Cancer, CVA. Positive as follows:    No family history on file. REVIEW OF SYSTEMS:   Pertinent positives as noted in the HPI. All other systems reviewed and negative. PHYSICAL EXAM:    BP (!) 180/71   Pulse 83   Temp 98.5 °F (36.9 °C) (Oral)   Resp 24   Ht 5' 2\" (1.575 m)   Wt 160 lb (72.6 kg)   SpO2 94%   BMI 29.26 kg/m²     General appearance:  No apparent distress, appears stated age and cooperative. HEENT:  Normal cephalic, atraumatic without obvious deformity. Pupils equal, round, and reactive to light. Extra ocular muscles intact. Conjunctivae/corneas clear. Neck: Supple, with full range of motion. No jugular venous distention. Trachea midline. Respiratory:  Normal respiratory effort. Clear to auscultation, bilaterally without Rales/Wheezes/Rhonchi. Cardiovascular:  Regular rate and rhythm with normal S1/S2 without murmurs, rubs or gallops. Abdomen: Soft, non-tender, non-distended with normal bowel sounds. Musculoskeletal:  No clubbing, cyanosis or edema bilaterally. Full range of motion without deformity. Skin: Skin color, texture, turgor normal.  No rashes or lesions. Neurologic:  Neurovascularly intact without any focal sensory/motor deficits.  Cranial nerves: II-XII intact, grossly non-focal.  Psychiatric:  Alert and oriented, thought content appropriate, normal insight  Capillary Refill: Brisk,< 3 seconds   Peripheral Pulses: +2 palpable, equal bilaterally       Labs:     Recent Labs     07/10/22  1600   WBC 9.8   HGB 13.0   HCT 39.4        Recent Labs     07/10/22  1600      K 3.4   CL 98   CO2 26   BUN 47*   CREATININE 1.37*   CALCIUM 9.3     Recent Labs     07/10/22  1600   AST 13   ALT 8   BILITOT 0.4   ALKPHOS 87     Recent Labs     07/10/22  1600   INR 1.1     Recent Labs     07/10/22  1600   TROPONINI <0.010       Urinalysis:      Lab Results   Component Value Date/Time    NITRU Negative 07/10/2022 04:00 PM    WBCUA  10/15/2021 01:07 PM    BACTERIA FEW 10/15/2021 01:07 PM    RBCUA None seen 06/10/2021 03:45 PM    BLOODU Negative 07/10/2022 04:00 PM    SPECGRAV 1.045 07/10/2022 04:00 PM    GLUCOSEU Negative 07/10/2022 04:00 PM       Radiology:     CXR: I have reviewed the CXR with the following interpretation:   EKG:  I have reviewed the EKG with the following interpretation:     CTA Neck W WO Contrast   Final Result   FINAL IMPRESSION:       NO FLOW-LIMITING STENOSIS, DISSECTION OR LARGE VESSEL INTRACRANIAL ARTERIAL OCCLUSION IDENTIFIED. CTA Head W WO Contrast   Final Result   FINAL IMPRESSION:       NO FLOW-LIMITING STENOSIS, DISSECTION OR LARGE VESSEL INTRACRANIAL ARTERIAL OCCLUSION IDENTIFIED. CT Head WO Contrast   Final Result      NO ACUTE INTRACRANIAL PROCESSOR SIGNIFICANT CHANGE FROM 5/6/2020 IDENTIFIED. XR CHEST PORTABLE   Final Result      NO EVIDENCE OF ACTIVE CARDIOPULMONARY DISEASE, BY PORTABLE CHEST RADIOGRAPHY.                 ASSESSMENT:    Active Hospital Problems    Diagnosis Date Noted    Change in mental state [R41.82] 07/10/2022     Priority: Medium       PLAN:        DVT Prophylaxis:   Diet: No diet orders on file  Code Status: Prior    PT/OT Eval Status:     Dispo - Acute change in mental status, negative CT brain, will admit for acute management with MRI, neurology on case for further management   DALILA/dehydration- initiated IV hydration, repeat BMP AM  Suspected UTI- UA pending, initiating IV levaquin, further recommendations after UA is available  HTN- meds adjusted, follow up clinically  Discussed with family and

## 2022-07-11 ENCOUNTER — APPOINTMENT (OUTPATIENT)
Dept: ULTRASOUND IMAGING | Age: 87
DRG: 071 | End: 2022-07-11
Payer: MEDICARE

## 2022-07-11 ENCOUNTER — APPOINTMENT (OUTPATIENT)
Dept: MRI IMAGING | Age: 87
DRG: 071 | End: 2022-07-11
Payer: MEDICARE

## 2022-07-11 LAB
ANION GAP SERPL CALCULATED.3IONS-SCNC: 13 MEQ/L (ref 9–15)
BUN BLDV-MCNC: 29 MG/DL (ref 8–23)
CALCIUM SERPL-MCNC: 8.8 MG/DL (ref 8.5–9.9)
CHLORIDE BLD-SCNC: 103 MEQ/L (ref 95–107)
CHOLESTEROL, TOTAL: 143 MG/DL (ref 0–199)
CO2: 24 MEQ/L (ref 20–31)
CREAT SERPL-MCNC: 0.62 MG/DL (ref 0.5–0.9)
EKG ATRIAL RATE: 78 BPM
EKG P AXIS: 48 DEGREES
EKG P-R INTERVAL: 132 MS
EKG Q-T INTERVAL: 438 MS
EKG QRS DURATION: 142 MS
EKG QTC CALCULATION (BAZETT): 499 MS
EKG R AXIS: 64 DEGREES
EKG T AXIS: 36 DEGREES
EKG VENTRICULAR RATE: 78 BPM
GFR AFRICAN AMERICAN: 43
GFR AFRICAN AMERICAN: >60
GFR NON-AFRICAN AMERICAN: 36
GFR NON-AFRICAN AMERICAN: >60
GLUCOSE BLD-MCNC: 109 MG/DL (ref 70–99)
HCT VFR BLD CALC: 36.8 % (ref 37–47)
HDLC SERPL-MCNC: 38 MG/DL (ref 40–59)
HEMOGLOBIN: 12.2 G/DL (ref 12–16)
LDL CHOLESTEROL CALCULATED: 70 MG/DL (ref 0–129)
MAGNESIUM: 1.6 MG/DL (ref 1.7–2.4)
MCH RBC QN AUTO: 29.9 PG (ref 27–31.3)
MCHC RBC AUTO-ENTMCNC: 33 % (ref 33–37)
MCV RBC AUTO: 90.6 FL (ref 82–100)
PDW BLD-RTO: 14.5 % (ref 11.5–14.5)
PERFORMED ON: ABNORMAL
PLATELET # BLD: 245 K/UL (ref 130–400)
POC CREATININE: 1.4 MG/DL (ref 0.6–1.2)
POC SAMPLE TYPE: ABNORMAL
POTASSIUM REFLEX MAGNESIUM: 3.4 MEQ/L (ref 3.4–4.9)
RBC # BLD: 4.07 M/UL (ref 4.2–5.4)
SODIUM BLD-SCNC: 140 MEQ/L (ref 135–144)
TRIGL SERPL-MCNC: 173 MG/DL (ref 0–150)
WBC # BLD: 10.5 K/UL (ref 4.8–10.8)

## 2022-07-11 PROCEDURE — 6370000000 HC RX 637 (ALT 250 FOR IP): Performed by: INTERNAL MEDICINE

## 2022-07-11 PROCEDURE — 85027 COMPLETE CBC AUTOMATED: CPT

## 2022-07-11 PROCEDURE — 6360000002 HC RX W HCPCS: Performed by: INTERNAL MEDICINE

## 2022-07-11 PROCEDURE — 93880 EXTRACRANIAL BILAT STUDY: CPT

## 2022-07-11 PROCEDURE — 70544 MR ANGIOGRAPHY HEAD W/O DYE: CPT

## 2022-07-11 PROCEDURE — 99222 1ST HOSP IP/OBS MODERATE 55: CPT | Performed by: PSYCHIATRY & NEUROLOGY

## 2022-07-11 PROCEDURE — 83735 ASSAY OF MAGNESIUM: CPT

## 2022-07-11 PROCEDURE — 70551 MRI BRAIN STEM W/O DYE: CPT

## 2022-07-11 PROCEDURE — 99221 1ST HOSP IP/OBS SF/LOW 40: CPT | Performed by: INTERNAL MEDICINE

## 2022-07-11 PROCEDURE — 80061 LIPID PANEL: CPT

## 2022-07-11 PROCEDURE — 2580000003 HC RX 258: Performed by: INTERNAL MEDICINE

## 2022-07-11 PROCEDURE — 36415 COLL VENOUS BLD VENIPUNCTURE: CPT

## 2022-07-11 PROCEDURE — 80048 BASIC METABOLIC PNL TOTAL CA: CPT

## 2022-07-11 PROCEDURE — 1210000000 HC MED SURG R&B

## 2022-07-11 PROCEDURE — APPSS45 APP SPLIT SHARED TIME 31-45 MINUTES: Performed by: PHYSICIAN ASSISTANT

## 2022-07-11 PROCEDURE — 93010 ELECTROCARDIOGRAM REPORT: CPT | Performed by: INTERNAL MEDICINE

## 2022-07-11 RX ORDER — ENOXAPARIN SODIUM 100 MG/ML
40 INJECTION SUBCUTANEOUS DAILY
Status: DISCONTINUED | OUTPATIENT
Start: 2022-07-12 | End: 2022-07-13 | Stop reason: HOSPADM

## 2022-07-11 RX ORDER — LEVOFLOXACIN 5 MG/ML
750 INJECTION, SOLUTION INTRAVENOUS EVERY 24 HOURS
Status: DISCONTINUED | OUTPATIENT
Start: 2022-07-11 | End: 2022-07-12

## 2022-07-11 RX ADMIN — NITROGLYCERIN 1 INCH: 20 OINTMENT TOPICAL at 06:34

## 2022-07-11 RX ADMIN — ENOXAPARIN SODIUM 30 MG: 100 INJECTION SUBCUTANEOUS at 08:48

## 2022-07-11 RX ADMIN — CARVEDILOL 6.25 MG: 3.12 TABLET, FILM COATED ORAL at 08:48

## 2022-07-11 RX ADMIN — ASPIRIN 325 MG: 325 TABLET ORAL at 08:48

## 2022-07-11 RX ADMIN — LEVOFLOXACIN 750 MG: 5 INJECTION, SOLUTION INTRAVENOUS at 22:15

## 2022-07-11 RX ADMIN — FAMOTIDINE 20 MG: 20 TABLET, FILM COATED ORAL at 08:48

## 2022-07-11 RX ADMIN — SERTRALINE HYDROCHLORIDE 50 MG: 50 TABLET ORAL at 08:48

## 2022-07-11 RX ADMIN — VALSARTAN 160 MG: 160 TABLET, FILM COATED ORAL at 08:48

## 2022-07-11 RX ADMIN — CARVEDILOL 6.25 MG: 3.12 TABLET, FILM COATED ORAL at 20:46

## 2022-07-11 RX ADMIN — Medication 10 ML: at 20:47

## 2022-07-11 RX ADMIN — ATORVASTATIN CALCIUM 20 MG: 20 TABLET, FILM COATED ORAL at 20:47

## 2022-07-11 RX ADMIN — NITROGLYCERIN 1 INCH: 20 OINTMENT TOPICAL at 13:29

## 2022-07-11 ASSESSMENT — ENCOUNTER SYMPTOMS
NAUSEA: 0
SHORTNESS OF BREATH: 0
DIARRHEA: 0
CHEST TIGHTNESS: 0
COLOR CHANGE: 0
COUGH: 0
PHOTOPHOBIA: 0
BACK PAIN: 0
VOMITING: 0
TROUBLE SWALLOWING: 0
CHOKING: 0

## 2022-07-11 ASSESSMENT — PAIN SCALES - GENERAL
PAINLEVEL_OUTOF10: 0
PAINLEVEL_OUTOF10: 0

## 2022-07-11 NOTE — PROGRESS NOTES
Pt A&Ox 4 at this time but does have periods of confusion. Pt also having difficulty finding words in both [place] and english. Speech is slightly slurred but understandable. No facial droop noted.

## 2022-07-11 NOTE — CONSULTS
Subjective:      Patient ID: Kaia Smith is a 82 y.o. female who presents today for aphasia. HPI 82 right-handed female with a 2 days history of aphasia. Patient has difficulty comprehending and difficulty with speech. She does understand and does comply with exams but not able to verbalize much. Son is at the bedside and is able to give me history. She also had an event in her life with loss of her brother few days ago which has also affected her. Review of system done through the son as patient does speak [place] and [place] but since this is happened the language issues are complex. She is not any falls injuries trauma she is otherwise healthy and has not reportedly had any dementia or memory issues. She lives with the family    Review of Systems   Constitutional: Negative for fever. HENT: Negative for ear pain, tinnitus and trouble swallowing. Eyes: Negative for photophobia and visual disturbance. Respiratory: Negative for choking and shortness of breath. Cardiovascular: Negative for chest pain and palpitations. Gastrointestinal: Negative for nausea and vomiting. Musculoskeletal: Negative for back pain, gait problem, joint swelling, myalgias, neck pain and neck stiffness. Skin: Negative for color change. Allergic/Immunologic: Negative for food allergies. Neurological: Negative for dizziness, tremors, seizures, syncope, facial asymmetry, speech difficulty, weakness, light-headedness, numbness and headaches. Psychiatric/Behavioral: Negative for behavioral problems, confusion, hallucinations and sleep disturbance.      Much of the review of system is done with speech issues though compliant  Past Medical History:   Diagnosis Date    Anxiety     Cirrhosis ([address]     Coronary artery disease involving native coronary artery of native heart without angina pectoris 1/8/2018    HTN (hypertension)      Past Surgical History:   Procedure Laterality Date    CORONARY ARTERY BYPASS GRAFT       Social History     Socioeconomic History    Marital status:      Spouse name: Not on file    Number of children: Not on file    Years of education: Not on file    Highest education level: Not on file   Occupational History    Not on file   Tobacco Use    Smoking status: Never Smoker    Smokeless tobacco: Never Used   Vaping Use    Vaping Use: Never used   Substance and Sexual Activity    Alcohol use: No    Drug use: No    Sexual activity: Not on file   Other Topics Concern    Not on file   Social History Narrative    Not on file     Social Determinants of Health     Financial Resource Strain:     Difficulty of Paying Living Expenses: Not on file   Food Insecurity:     Worried About Running Out of Food in the Last Year: Not on file    Olga of Food in the Last Year: Not on file   Transportation Needs:     Lack of Transportation (Medical): Not on file    Lack of Transportation (Non-Medical): Not on file   Physical Activity:     Days of Exercise per Week: Not on file    Minutes of Exercise per Session: Not on file   Stress:     Feeling of Stress : Not on file   Social Connections:     Frequency of Communication with Friends and Family: Not on file    Frequency of Social Gatherings with Friends and Family: Not on file    Attends Sabianist Services: Not on file    Active Member of [address] [place] [address] [employer] or Organizations: Not on file    Attends Club or Organization Meetings: Not on file    Marital Status: Not on file   Intimate Partner Violence:     Fear of Current or Ex-Partner: Not on file    Emotionally Abused: Not on file    Physically Abused: Not on file    Sexually Abused: Not on file   Housing Stability:     Unable to Pay for Housing in the Last Year: Not on file    Number of [place] in the Last Year: Not on file    Unstable Housing in the Last Year: Not on file     No family history on file.   Allergies   Allergen Reactions    Carbapenems     Cephalosporins     Hydralazine Other (See Comments)    Pcn [Penicillins]      Current Facility-Administered Medications   Medication Dose Route Frequency Provider Last Rate Last Admin    atorvastatin (LIPITOR) tablet 20 mg  20 mg Oral Nightly Farrah Ndiaye MD   20 mg at 07/10/22 2154    carvedilol (COREG) tablet 6.25 mg  6.25 mg Oral BID Farrah Ndiaye MD   6.25 mg at 07/11/22 0848    diazePAM (VALIUM) tablet 2.5 mg  2.5 mg Oral Q8H PRN Farrah Ndiaye MD        famotidine (PEPCID) tablet 20 mg  20 mg Oral Daily Farrah Ndiaye MD   20 mg at 07/11/22 0848    ondansetron (ZOFRAN) tablet 4 mg  4 mg Oral Q12H PRN Farrah Ndiaye MD        sertraline (ZOLOFT) tablet 50 mg  50 mg Oral Daily Farrah Ndiaye MD   50 mg at 07/11/22 0848    sodium chloride flush 0.9 % injection 5-40 mL  5-40 mL IntraVENous 2 times per day Farrah Ndiaye MD        sodium chloride flush 0.9 % injection 5-40 mL  5-40 mL IntraVENous PRN Farrah Ndiaye MD        0.9 % sodium chloride infusion   IntraVENous PRN Farrah Ndiaye MD        ondansetron [facility]) injection 4 mg  4 mg IntraVENous Q6H PRN Farrah Ndiaye MD        acetaminophen (TYLENOL) tablet 650 mg  650 mg Oral Q6H PRN Farrah Ndiaye MD        Or    acetaminophen (TYLENOL) suppository 650 mg  650 mg Rectal Q6H PRN Farrah Ndiaye MD        polyethylene glycol Sariah Rile) packet 17 g  17 g Oral Daily PRN Farrah Ndiaye MD        aspirin tablet 325 mg  325 mg Oral Daily Farrah Ndiaye MD   325 mg at 07/11/22 0848    enoxaparin Sodium (LOVENOX) injection 30 mg  30 mg SubCUTAneous Daily Farrah Ndiaye MD   30 mg at 07/11/22 0848    potassium chloride (KLOR-CON M) extended release tablet 40 mEq  40 mEq Oral PRN Farrah Ndiaye MD        Or    potassium bicarb-citric acid (EFFER-K) effervescent tablet 40 mEq  40 mEq Oral PRN Farrah Ndiaye MD        Or    potassium chloride 10 mEq/100 mL IVPB (Peripheral Line)  10 mEq IntraVENous PRN Farrah Ndiaye MD        nitroglycerin (NITRO-BID) 2 % ointment 1 inch  1 inch Topical 4 times per spine CT 4/29/2019. TECHNIQUE: Spiral images were obtained of the head and neck after the uneventful intravenous administration of approximately 100 mL of Isovue-300 contrast with CTA protocol. Internal carotid narrowings are estimated using NASCET criteria. Routine and volume rendered images were obtained on a 3-dimensional workstation. All CT scans at this facility use dose modulation, iterative reconstruction, and/or weight based dosing when appropriate to reduce radiation dose to as low as reasonably achievable. HEAD CTA FINDINGS: There is no large vessel intracranial arterial occlusion, flow-limiting stenosis, intracranial aneurysm, evidence of vascular malformation, dural venous sinus thrombosis, or developmental variations of concern identified. Mild calcific plaquing is present of the cavernous carotids and the V4 segment of a developmentally dominant left vertebral artery. NECK CTA FINDINGS: There is no flow-limiting stenosis, dissection, or acute findings identified in the neck. Mild atherosclerotic plaquing is noted in the aortic arch, great vessel origins and right carotid bulb. The left vertebral artery is developmentally dominant. Small thyroid nodules measuring up to just over 1 cm and mild degenerative changes of the cervical spine appears similar to 4/29/2019. FINAL IMPRESSION: NO FLOW-LIMITING STENOSIS, DISSECTION OR LARGE VESSEL INTRACRANIAL ARTERIAL OCCLUSION IDENTIFIED. CT Head WO Contrast    Result Date: 7/10/2022  CT HEAD WO CONTRAST : 7/10/2022 CLINICAL HISTORY:  slurred speech . COMPARISON: 5/6/2020. TECHNIQUE: Spiral unenhanced images were obtained of the head, with routine multiplanar reconstructions performed. All CT scans at this facility use dose modulation, iterative reconstruction, and/or weight based dosing when appropriate to reduce radiation dose to as low as reasonably achievable.  FINDINGS: There is no intracranial hemorrhage, mass effect, midline shift, extra-axial collection, evidence of hydrocephalus, recent ischemic infarct, or skull fracture identified. Mild generalized cerebral volume loss and moderate patchy white matter changes are again noted. The mastoid air cells and visualized paranasal sinuses are essentially clear. NO ACUTE INTRACRANIAL PROCESSOR SIGNIFICANT CHANGE FROM 5/6/2020 IDENTIFIED. CTA Neck W WO Contrast    Result Date: 7/10/2022  CTA HEAD W WO CONTRAST, CTA NECK W WO CONTRAST : 7/10/2022 CLINICAL HISTORY:  slurred speech . COMPARISON: Head CT from earlier 7/10/2022 and cervical spine CT 4/29/2019. TECHNIQUE: Spiral images were obtained of the head and neck after the uneventful intravenous administration of approximately 100 mL of Isovue-300 contrast with CTA protocol. Internal carotid narrowings are estimated using NASCET criteria. Routine and volume rendered images were obtained on a 3-dimensional workstation. All CT scans at this facility use dose modulation, iterative reconstruction, and/or weight based dosing when appropriate to reduce radiation dose to as low as reasonably achievable. HEAD CTA FINDINGS: There is no large vessel intracranial arterial occlusion, flow-limiting stenosis, intracranial aneurysm, evidence of vascular malformation, dural venous sinus thrombosis, or developmental variations of concern identified. Mild calcific plaquing is present of the cavernous carotids and the V4 segment of a developmentally dominant left vertebral artery. NECK CTA FINDINGS: There is no flow-limiting stenosis, dissection, or acute findings identified in the neck. Mild atherosclerotic plaquing is noted in the aortic arch, great vessel origins and right carotid bulb. The left vertebral artery is developmentally dominant. Small thyroid nodules measuring up to just over 1 cm and mild degenerative changes of the cervical spine appears similar to 4/29/2019.      FINAL IMPRESSION: NO FLOW-LIMITING STENOSIS, DISSECTION OR LARGE VESSEL INTRACRANIAL ARTERIAL OCCLUSION IDENTIFIED. XR CHEST PORTABLE    Result Date: 7/10/2022  XR CHEST PORTABLE : 7/10/2022 CLINICAL HISTORY:  confusion, slurred speech . COMPARISON: 6/10/2021 TECHNIQUE: A portable upright AP radiograph of the chest was obtained. FINDINGS: Shallow inspiratory volumes are present without significant infiltrate identified. There is no cardiomegaly, significant pleural effusion, vascular congestion, pneumothorax, or displaced fractures identified. 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 NO EVIDENCE OF ACTIVE CARDIOPULMONARY DISEASE, BY PORTABLE CHEST RADIOGRAPHY.        Lab Results   Component Value Date/Time    WBC 10.5 07/11/2022 06:01 AM    RBC 4.07 07/11/2022 06:01 AM    HGB 12.2 07/11/2022 06:01 AM    HCT 36.8 07/11/2022 06:01 AM    MCV 90.6 07/11/2022 06:01 AM    MCH 29.9 07/11/2022 06:01 AM    MCHC 33.0 07/11/2022 06:01 AM    RDW 14.5 07/11/2022 06:01 AM     07/11/2022 06:01 AM    MPV 8.7 09/17/2014 08:30 AM     Lab Results   Component Value Date/Time     07/11/2022 06:01 AM    K 3.4 07/11/2022 06:01 AM     07/11/2022 06:01 AM    CO2 24 07/11/2022 06:01 AM    BUN 29 07/11/2022 06:01 AM    CREATININE 0.62 07/11/2022 06:01 AM    GFRAA >60.0 07/11/2022 06:01 AM    LABGLOM >60.0 07/11/2022 06:01 AM    GLUCOSE 109 07/11/2022 06:01 AM    PROT 7.3 07/10/2022 04:00 PM    LABALBU 4.0 07/10/2022 04:00 PM    CALCIUM 8.8 07/11/2022 06:01 AM    BILITOT 0.4 07/10/2022 04:00 PM    ALKPHOS 87 07/10/2022 04:00 PM    AST 13 07/10/2022 04:00 PM    ALT 8 07/10/2022 04:00 PM     Lab Results   Component Value Date/Time    PROTIME 14.3 07/10/2022 04:00 PM    INR 1.1 07/10/2022 04:00 PM     Lab Results   Component Value Date/Time    TSH 3.830 10/13/2021 02:08 PM    IRON 94 03/08/2019 01:52 PM    TIBC 315 03/08/2019 01:52 PM     Lab Results   Component Value Date/Time    TRIG 173 07/11/2022 06:01 AM    HDL 38 07/11/2022 06:01 AM    LDLCALC 70 07/11/2022 06:01 AM     Lab Results Component Value Date/Time    LABAMPH Neg 07/10/2022 04:00 PM    BARBSCNU Neg 07/10/2022 04:00 PM    LABBENZ POSITIVE 07/10/2022 04:00 PM    LABMETH Neg 07/10/2022 04:00 PM    OPIATESCREENURINE Neg 07/10/2022 04:00 PM    PHENCYCLIDINESCREENURINE Neg 07/10/2022 04:00 PM    ETOH <10 07/10/2022 04:00 PM     No results found for: LITHIUM, DILFRTOT, VALPROATE    Assessment:   Aphasia with word finding issues as well as following commands. She is able to comprehend though takes her own time. She is not frustrated regarding this. This either is a right parietal lobe stroke or AP or posterior left parietal stroke. If the MRI surprisingly negative then this may be an aftermath of her recent social event that occurred with loss of her brother and may have affected her. We will arrange for an EEG to make sure there is not status epilepticus either. Patient does have some risk factors for some vascular disease and high risk for atrial fibrillation. Was on aspirin but no other notable anticoagulation. She is otherwise very functional.  Next of this consultation includes my consultation with emergency room. Donald Mccullough MD, Beau Oswald, American Board of Psychiatry & Neurology  Board Certified in Vascular Neurology  Board Certified in Neuromuscular Medicine  Certified in [facility]:

## 2022-07-11 NOTE — CONSULTS
Consult Note  Patient: Katharine Garsia  Unit/Bed: T204/E213-05  YOB: 1935  MRN: [id number]  Acct: [de-identified]   Admitting Diagnosis: Change in mental state [R41.82]  Confusion and disorientation [R41.0]  Date:  7/10/2022  Hospital Day: 1      Chief Complaint:  Confusion    History of Present Illness: This is a pleasant 82-year-old  female with past medical history significant for coronary artery disease status post CABG approximately 15 years ago, hypertension and history of anxiety who presented to ER yesterday with report of increasing confusion. Patient's son is at bedside currently and is providing history for the patient. Apparently patient resides with her  and a daughter and over the past couple days family has noticed the patient is increasingly confused. At her baseline she is alert and oriented x3. Apparently on day of presentation patient was not even able to carry on a conversation. Due to these mental status changes she was brought to ER for further evaluation. On presentation to ER, blood pressure 155/56, heart rate 80, respiratory rate 18, pulse ox 95%. Sodium 137, potassium low normal at 3.4, chloride 98, total CO2 of 26, BUN elevated 47, creatinine elevated 1.37, GFR low at 36.6, glucose 116. Troponin negative less than 0.010. Ethanol less than 10. Urine drug screen was positive for benzodiazepines but patient does take Valium at home. WBC 9.8, hemoglobin 13.0, hematocrit 39.4, platelets 552. Urinalysis unremarkable. Chest x-ray revealed no evidence of active cardiopulmonary disease. CT of the brain showed no acute intracranial process or significant change from 5/6/2020. CTA of the head and neck on 7/10/2022 showed no flow-limiting stenosis, dissection or large vessel intracranial arterial occlusion. She was admitted for further evaluation. Cardiology has been consulted for reported chest pain.   However, patient denies any chest pain complaints and patient's son at bedside denies any report of chest pain. Per her son, patient is slightly less confused at this time but still not at her baseline. At her baseline she is bilingual and speaks [place] fluently however she is only speaking in [place] at this time and appears to have difficulty understanding [place]. It appears that patient previously followed with Dr. Amy Handy who is now retired with last office appointment in August 2021. She had most recent stress test on 6/10/2021 which was negative for ischemia. No recent echocardiogram on record. Allergy evaluation is in progress and patient underwent MRI and MRA of the brain today with MRI showing no evidence of acute infarct and MRI of the brain demonstrating normal intracranial vessels. Bilateral carotid ultrasound revealed less than 50% bilateral ICA stenosis.     Allergies   Allergen Reactions    Carbapenems     Cephalosporins     Hydralazine Other (See Comments)    Pcn [Penicillins]        Current Facility-Administered Medications   Medication Dose Route Frequency Provider Last Rate Last Admin    atorvastatin (LIPITOR) tablet 20 mg  20 mg Oral Nightly Abelino Musa MD   20 mg at 07/10/22 2154    carvedilol (COREG) tablet 6.25 mg  6.25 mg Oral BID Abelino Musa MD   6.25 mg at 07/11/22 0848    diazePAM (VALIUM) tablet 2.5 mg  2.5 mg Oral Q8H PRN Abelino Musa MD        famotidine (PEPCID) tablet 20 mg  20 mg Oral Daily Abelino Musa MD   20 mg at 07/11/22 0848    ondansetron (ZOFRAN) tablet 4 mg  4 mg Oral Q12H PRN Abelino Musa MD        sertraline (ZOLOFT) tablet 50 mg  50 mg Oral Daily Abelino Musa MD   50 mg at 07/11/22 0848    sodium chloride flush 0.9 % injection 5-40 mL  5-40 mL IntraVENous 2 times per day Abelino Musa MD        sodium chloride flush 0.9 % injection 5-40 mL  5-40 mL IntraVENous PRN Abelino Musa MD        0.9 % sodium chloride infusion   IntraVENous PRN Abelino Musa MD        ondansetron [facility]) injection 4 mg  4 mg Negative for nausea and vomiting. Genitourinary: Negative for difficulty urinating and dysuria. Musculoskeletal: Negative for arthralgias. Skin: Negative for color change. Neurological: Negative for dizziness, syncope and light-headedness. Psychiatric/Behavioral: Negative for agitation. Physical Examination:    BP (!) 177/66   Pulse 76   Temp 98.8 °F (37.1 °C)   Resp 18   Ht 5' 2\" (1.575 m)   Wt 160 lb (72.6 kg)   SpO2 97%   BMI 29.26 kg/m²    Physical Exam  Constitutional:       General: She is not in acute distress. Appearance: She is obese. HENT:      Head: Normocephalic and atraumatic. Cardiovascular:      Rate and Rhythm: Normal rate and regular rhythm. Pulmonary:      Effort: Pulmonary effort is normal. No respiratory distress. Breath sounds: No wheezing, rhonchi or rales. Abdominal:      Palpations: Abdomen is soft. Musculoskeletal:         General: Normal range of motion. Cervical back: Normal range of motion and neck supple. Right lower leg: No edema. Left lower leg: No edema. Skin:     General: Skin is warm and dry. Neurological:      General: No focal deficit present. Mental Status: She is alert. Cranial Nerves: No cranial nerve deficit.    Psychiatric:         Mood and Affect: Mood normal.         Behavior: Behavior normal.         LABS:  CBC:  Lab Results   Component Value Date/Time    WBC 10.5 07/11/2022 06:01 AM    RBC 4.07 07/11/2022 06:01 AM    HGB 12.2 07/11/2022 06:01 AM    HCT 36.8 07/11/2022 06:01 AM    MCV 90.6 07/11/2022 06:01 AM    MCH 29.9 07/11/2022 06:01 AM    MCHC 33.0 07/11/2022 06:01 AM    RDW 14.5 07/11/2022 06:01 AM     07/11/2022 06:01 AM    MPV 8.7 09/17/2014 08:30 AM     CBC with Differential:   Lab Results   Component Value Date/Time    WBC 10.5 07/11/2022 06:01 AM    RBC 4.07 07/11/2022 06:01 AM    HGB 12.2 07/11/2022 06:01 AM    HCT 36.8 07/11/2022 06:01 AM     07/11/2022 06:01 AM    MCV 90.6 07/11/2022 06:01 AM    MCH 29.9 07/11/2022 06:01 AM    MCHC 33.0 07/11/2022 06:01 AM    RDW 14.5 07/11/2022 06:01 AM    LYMPHOPCT 26.2 07/10/2022 04:00 PM    MONOPCT 13.2 07/10/2022 04:00 PM    BASOPCT 0.7 07/10/2022 04:00 PM    MONOSABS 1.3 07/10/2022 04:00 PM    LYMPHSABS 2.6 07/10/2022 04:00 PM    EOSABS 0.1 07/10/2022 04:00 PM    BASOSABS 0.1 07/10/2022 04:00 PM     CMP:    Lab Results   Component Value Date/Time     07/11/2022 06:01 AM    K 3.4 07/11/2022 06:01 AM     07/11/2022 06:01 AM    CO2 24 07/11/2022 06:01 AM    BUN 29 07/11/2022 06:01 AM    CREATININE 0.62 07/11/2022 06:01 AM    GFRAA >60.0 07/11/2022 06:01 AM    LABGLOM >60.0 07/11/2022 06:01 AM    GLUCOSE 109 07/11/2022 06:01 AM    PROT 7.3 07/10/2022 04:00 PM    LABALBU 4.0 07/10/2022 04:00 PM    CALCIUM 8.8 07/11/2022 06:01 AM    BILITOT 0.4 07/10/2022 04:00 PM    ALKPHOS 87 07/10/2022 04:00 PM    AST 13 07/10/2022 04:00 PM    ALT 8 07/10/2022 04:00 PM     BMP:    Lab Results   Component Value Date/Time     07/11/2022 06:01 AM    K 3.4 07/11/2022 06:01 AM     07/11/2022 06:01 AM    CO2 24 07/11/2022 06:01 AM    BUN 29 07/11/2022 06:01 AM    LABALBU 4.0 07/10/2022 04:00 PM    CREATININE 0.62 07/11/2022 06:01 AM    CALCIUM 8.8 07/11/2022 06:01 AM    GFRAA >60.0 07/11/2022 06:01 AM    LABGLOM >60.0 07/11/2022 06:01 AM    GLUCOSE 109 07/11/2022 06:01 AM     Magnesium:    Lab Results   Component Value Date/Time    MG 1.6 07/11/2022 06:01 AM     Troponin:    Lab Results   Component Value Date/Time    TROPONINI <0.010 07/10/2022 04:00 PM       Radiology:  CTA Head W WO Contrast    Result Date: 7/10/2022  CTA HEAD W WO CONTRAST, CTA NECK W WO CONTRAST : 7/10/2022 CLINICAL HISTORY:  slurred speech . COMPARISON: Head CT from earlier 7/10/2022 and cervical spine CT 4/29/2019.  TECHNIQUE: Spiral images were obtained of the head and neck after the uneventful intravenous administration of approximately 100 mL of Isovue-300 contrast with CTA protocol. Internal carotid narrowings are estimated using NASCET criteria. Routine and volume rendered images were obtained on a 3-dimensional workstation. All CT scans at this facility use dose modulation, iterative reconstruction, and/or weight based dosing when appropriate to reduce radiation dose to as low as reasonably achievable. HEAD CTA FINDINGS: There is no large vessel intracranial arterial occlusion, flow-limiting stenosis, intracranial aneurysm, evidence of vascular malformation, dural venous sinus thrombosis, or developmental variations of concern identified. Mild calcific plaquing is present of the cavernous carotids and the V4 segment of a developmentally dominant left vertebral artery. NECK CTA FINDINGS: There is no flow-limiting stenosis, dissection, or acute findings identified in the neck. Mild atherosclerotic plaquing is noted in the aortic arch, great vessel origins and right carotid bulb. The left vertebral artery is developmentally dominant. Small thyroid nodules measuring up to just over 1 cm and mild degenerative changes of the cervical spine appears similar to 4/29/2019. FINAL IMPRESSION: NO FLOW-LIMITING STENOSIS, DISSECTION OR LARGE VESSEL INTRACRANIAL ARTERIAL OCCLUSION IDENTIFIED. CT Head WO Contrast    Result Date: 7/10/2022  CT HEAD WO CONTRAST : 7/10/2022 CLINICAL HISTORY:  slurred speech . COMPARISON: 5/6/2020. TECHNIQUE: Spiral unenhanced images were obtained of the head, with routine multiplanar reconstructions performed. All CT scans at this facility use dose modulation, iterative reconstruction, and/or weight based dosing when appropriate to reduce radiation dose to as low as reasonably achievable. FINDINGS: There is no intracranial hemorrhage, mass effect, midline shift, extra-axial collection, evidence of hydrocephalus, recent ischemic infarct, or skull fracture identified.   Mild generalized cerebral volume loss and moderate patchy white matter changes are again noted. The mastoid air cells and visualized paranasal sinuses are essentially clear. NO ACUTE INTRACRANIAL PROCESSOR SIGNIFICANT CHANGE FROM 5/6/2020 IDENTIFIED. CTA Neck W WO Contrast    Result Date: 7/10/2022  CTA HEAD W WO CONTRAST, CTA NECK W WO CONTRAST : 7/10/2022 CLINICAL HISTORY:  slurred speech . COMPARISON: Head CT from earlier 7/10/2022 and cervical spine CT 4/29/2019. TECHNIQUE: Spiral images were obtained of the head and neck after the uneventful intravenous administration of approximately 100 mL of Isovue-300 contrast with CTA protocol. Internal carotid narrowings are estimated using NASCET criteria. Routine and volume rendered images were obtained on a 3-dimensional workstation. All CT scans at this facility use dose modulation, iterative reconstruction, and/or weight based dosing when appropriate to reduce radiation dose to as low as reasonably achievable. HEAD CTA FINDINGS: There is no large vessel intracranial arterial occlusion, flow-limiting stenosis, intracranial aneurysm, evidence of vascular malformation, dural venous sinus thrombosis, or developmental variations of concern identified. Mild calcific plaquing is present of the cavernous carotids and the V4 segment of a developmentally dominant left vertebral artery. NECK CTA FINDINGS: There is no flow-limiting stenosis, dissection, or acute findings identified in the neck. Mild atherosclerotic plaquing is noted in the aortic arch, great vessel origins and right carotid bulb. The left vertebral artery is developmentally dominant. Small thyroid nodules measuring up to just over 1 cm and mild degenerative changes of the cervical spine appears similar to 4/29/2019. FINAL IMPRESSION: NO FLOW-LIMITING STENOSIS, DISSECTION OR LARGE VESSEL INTRACRANIAL ARTERIAL OCCLUSION IDENTIFIED. MRA HEAD WO CONTRAST    Result Date: 7/11/2022  EXAMINATION:  MRI BRAIN. MRA INTRACRANIAL VESSELS. CLINICAL HISTORY:  CVA.  COMPARISONS:  NONE AVAILABLE TECHNIQUE:  T1, T2 and diffusion scans. MIP Images. No contrast. FINDINGS:  Scans through the brain demonstrates a considerable amount of ischemic white matter change. There is no evidence of a mass or infarct. There is no hemorrhage or hematoma. There is no midline shift or evidence of increased intracranial pressure. There is mild generalized atrophy. MRA scans demonstrates the internal carotid arteries and the basilar artery to be patent and normal in caliber. The superior cerebral arteries are patent. Middle cerebral and anterior cerebral arteries are patent. No abnormal vascularity is demonstrated. No definite evidence of an aneurysm. 1. MRI SCAN OF THE BRAIN DEMONSTRATES A CONSIDERABLE AMOUNT OF ISCHEMIC WHITE MATTER CHANGE. NO EVIDENCE OF AN ACUTE INFARCT. 2. MRA SCANS DEMONSTRATE INTRACRANIAL VESSELS TO BE NORMAL. XR CHEST PORTABLE    Result Date: 7/10/2022  XR CHEST PORTABLE : 7/10/2022 CLINICAL HISTORY:  confusion, slurred speech . COMPARISON: 6/10/2021 TECHNIQUE: A portable upright AP radiograph of the chest was obtained. FINDINGS: Shallow inspiratory volumes are present without significant infiltrate identified. There is no cardiomegaly, significant pleural effusion, vascular congestion, pneumothorax, or displaced fractures identified. 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 NO EVIDENCE OF ACTIVE CARDIOPULMONARY DISEASE, BY PORTABLE CHEST RADIOGRAPHY. MRI BRAIN WO CONTRAST    Result Date: 7/11/2022  EXAMINATION:  MRI BRAIN. MRA INTRACRANIAL VESSELS. CLINICAL HISTORY:  CVA. COMPARISONS:  NONE AVAILABLE TECHNIQUE:  T1, T2 and diffusion scans. MIP Images. No contrast. FINDINGS:  Scans through the brain demonstrates a considerable amount of ischemic white matter change. There is no evidence of a mass or infarct. There is no hemorrhage or hematoma. There is no midline shift or evidence of increased intracranial pressure. There is mild generalized atrophy.  MRA scans demonstrates the internal carotid arteries and the basilar artery to be patent and normal in caliber. The superior cerebral arteries are patent. Middle cerebral and anterior cerebral arteries are patent. No abnormal vascularity is demonstrated. No definite evidence of an aneurysm. 1. MRI SCAN OF THE BRAIN DEMONSTRATES A CONSIDERABLE AMOUNT OF ISCHEMIC WHITE MATTER CHANGE. NO EVIDENCE OF AN ACUTE INFARCT. 2. MRA SCANS DEMONSTRATE INTRACRANIAL VESSELS TO BE NORMAL. Stress test 6/11/21:  Narrative   EXAMINATION:  GRADED EXERCISE MYOCARDIAL PERFUSION IMAGING       CLINICAL HISTORY:  CHEST PAIN       COMPARISONS:  NONE AVAILABLE       TECHNIQUE:  10.0 mCi noninjected patient was then stressed according to standard Lee protocol. Additional 32.0 mCi mildly injected and poststress images were obtained.       FINDINGS:  Underlying electrocardiogram a sinus rhythm heart with right bundle branch block rate is 100 at rest. Blood pressure 184/79. Total exercise time is 3 minutes and 3 seconds attaining a peak heart rate of 118 bpm peak blood pressure 210/92    representing 98% of age-matched maximum predicted heart rate giving her a functional capacity of 2.4 metabolic equivalents.       Patient had no complaints of angina. Patient remained in sinus rhythm throughout. She does have underlying right bundle branch block.       The tomographic images reveal transient ischemic dilatation score of 1.17.       Left ventricular ejection fraction 80%.       Perfusion scan demonstrates normal tracer uptake throughout the left ventricle segments.           Impression   POOR FUNCTIONAL CAPACITY FOR AGE.       NO EXERCISE-INDUCED ISCHEMIA NOR ARRHYTHMIAS.       NORMAL MYOCARDIAL PERFUSION IMAGING.       NORMAL LV EJECTION FRACTION.          EKG 7/11/22: SR 78, RBBB, ST depression with T wave inversion V1-V3, QTc 499ms    Telemetry 7/11/22:       Assessment:    Active Hospital Problems    Diagnosis Date Noted    Change in mental state [R41.82] 07/10/2022     Priority: Medium     1. Acute mental status change  2. DALILA--resolved  3. Reported chest pain--patient denies any CP complaints  4. HTN  5. Hx CAD s/p CABG--recent negative stress test on 6/11/21  6. Abnormal EKG/RBBB--unchanged from prior        Plan:  1. Continue current medications-aspirin 325 mg p.o. daily, Coreg 6.25 mg p.o. twice daily, Lipitor 20 mg p.o. nightly, losartan 160 mg p.o. daily, Zoloft 50 mg p.o. daily, Levaquin 750 mg IV every 48 hours, DVT prophylaxis with Lovenox 30 mg subcu daily  2. Cardiac diet recommended  3. Check echocardiogram  4. Monitor on telemetry for any tachycardia or bradycardia arrhythmias  5. Maintain potassium greater than 4, magnesium greater than 2  6. GI/DVT prophylaxis  7. Neurology recommendations regarding acute mental status changes--bilateral carotid ultrasound result pending, MRI brain showing considerable amount of ischemic white matter change but no evidence of an acute infarct; MRA of the brain reveals normal intracranial vessels  8. Hospitalist recommendations  9. Conservative medical therapy from a cardiac standpoint given acute mental status changes. Troponin is negative x1 and patient is without any anginal complaints. EKG from 7/10/2022 reviewed showing no acute change compared to prior. Status post stress test on 6/11/2021 which was negative for ischemia. 10. Further recommendations to follow            Electronically signed by BOO Renee on 7/11/2022 at 12:19 PM    Attending Supervising Loly Huang  I performed a history and physical examination on the patient and discussed the management with the physician assistant. I reviewed and agree with the findings and plan as documented in her note .     ID  61-year-old female history of CAD status post CABG, hypertension, hyperlipidemia, who was admitted to the hospital for altered mental status  Cardiology consulted for management of chest pain  Unable to obtain history from the patient as patient is currently not oriented. Per son at bedside he is not aware of any chest pain    EKG RBBB      General: Alert, not oriented, not acute distress  Lungs: Clear to auscultation bilaterally  CV: Regular rate and rhythm no murmurs  Extremity: No pitting edema    Assessment and plan:  Chest pain.   Unknown description, EKG RBBB with signs of ischemia,   History of CAD status post CABG in the past  Altered mental status  Hypertension  Hyperlipidemia    Plan:  Continue telemetry  Continue aspirin and statin  Obtain an echocardiogram  Continue Coreg 6.25 mg p.o. twice daily  Continue valsartan 160 mg daily  Please keep potassium between 4 and 5 magnesium above 2  Please keep hemoglobin above 8  Management of altered mental status as per primary team        Electronically signed by Darryl Adamson MD on 7/11/22 at 2:25 PM EDT

## 2022-07-11 NOTE — FLOWSHEET NOTE
Pt family would appreciate a follow up visit, pt not ready at this time to talk over [address], son was present during the visit, pt speaks [place], son will talk with his mother about [address], Pt was given a [place] copy of HCPOA for review

## 2022-07-11 NOTE — PROGRESS NOTES
Renal Adjustment Per Protocol:     Levofloxacin 750 mg every 48 hours changed to levofloxacin 750 mg every 24 hours based on resolution of DALILA    Recent Labs     07/11/22  0601   CREATININE 0.62   Estimated Creatinine Clearance: 62 mL/min (based on SCr of 0.62 mg/dL).   Indu Carrizales, PharmD  PGY-1 Pharmacy Resident  7/11/2022 1:45 PM

## 2022-07-11 NOTE — PROGRESS NOTES
Progress Note  Date:2022       Room:W278/W278-01  Patient Alex Reyna     YOB: 1935     Age:87 y.o. Subjective    Subjective:  Symptoms:  She reports malaise and weakness. No shortness of breath, cough, chest pain, headache, chest pressure, anorexia, diarrhea or anxiety. Diet:  No nausea or vomiting. Review of Systems   Respiratory: Negative for cough and shortness of breath. Cardiovascular: Negative for chest pain. Gastrointestinal: Negative for anorexia, diarrhea, nausea and vomiting. Neurological: Positive for weakness. Objective         Vitals Last 24 Hours:  TEMPERATURE:  Temp  Av.6 °F (37 °C)  Min: 98.5 °F (36.9 °C)  Max: 98.8 °F (37.1 °C)  RESPIRATIONS RANGE: Resp  Av.2  Min: 18  Max: 30  PULSE OXIMETRY RANGE: SpO2  Av.8 %  Min: 94 %  Max: 99 %  PULSE RANGE: Pulse  Av.7  Min: 73  Max: 84  BLOOD PRESSURE RANGE: Systolic (32ILR), LUCERO:468 , Min:143 , YHC:509   ; Diastolic (51ZLW), WUT:71, Min:49, Max:71    I/O (24Hr): Intake/Output Summary (Last 24 hours) at 2022 1401  Last data filed at 2022 0618  Gross per 24 hour   Intake 735.86 ml   Output --   Net 735.86 ml     Objective:  General Appearance:  Comfortable, well-appearing and in no acute distress. Vital signs: (most recent): Blood pressure (!) 177/66, pulse 76, temperature 98.8 °F (37.1 °C), resp. rate 18, height 5' 2\" (1.575 m), weight 160 lb (72.6 kg), SpO2 97 %. HEENT: Normal HEENT exam.    Lungs:  Normal effort. Heart: Normal rate. S1 normal and S2 normal.    Abdomen: Abdomen is soft. Bowel sounds are normal.   There is no epigastric area tenderness. Pulses: Distal pulses are intact. Neurological: Patient is alert and oriented to person, place and time. Pupils:  Pupils are equal, round, and reactive to light. Skin:  Warm.       Labs/Imaging/Diagnostics    Labs:  CBC:  Recent Labs     07/10/22  1600 22  0601   WBC 9.8 10.5   RBC 4.40 stenosis, intracranial aneurysm, evidence of vascular malformation, dural venous sinus thrombosis, or developmental variations of concern identified. Mild calcific plaquing is present of the cavernous carotids and the V4 segment of a developmentally dominant left vertebral artery. NECK CTA FINDINGS: There is no flow-limiting stenosis, dissection, or acute findings identified in the neck. Mild atherosclerotic plaquing is noted in the aortic arch, great vessel origins and right carotid bulb. The left vertebral artery is developmentally dominant. Small thyroid nodules measuring up to just over 1 cm and mild degenerative changes of the cervical spine appears similar to 4/29/2019. FINAL IMPRESSION: NO FLOW-LIMITING STENOSIS, DISSECTION OR LARGE VESSEL INTRACRANIAL ARTERIAL OCCLUSION IDENTIFIED. MRA HEAD WO CONTRAST    Result Date: 7/11/2022  EXAMINATION:  MRI BRAIN. MRA INTRACRANIAL VESSELS. CLINICAL HISTORY:  CVA. COMPARISONS:  NONE AVAILABLE TECHNIQUE:  T1, T2 and diffusion scans. MIP Images. No contrast. FINDINGS:  Scans through the brain demonstrates a considerable amount of ischemic white matter change. There is no evidence of a mass or infarct. There is no hemorrhage or hematoma. There is no midline shift or evidence of increased intracranial pressure. There is mild generalized atrophy. MRA scans demonstrates the internal carotid arteries and the basilar artery to be patent and normal in caliber. The superior cerebral arteries are patent. Middle cerebral and anterior cerebral arteries are patent. No abnormal vascularity is demonstrated. No definite evidence of an aneurysm. 1. MRI SCAN OF THE BRAIN DEMONSTRATES A CONSIDERABLE AMOUNT OF ISCHEMIC WHITE MATTER CHANGE. NO EVIDENCE OF AN ACUTE INFARCT. 2. MRA SCANS DEMONSTRATE INTRACRANIAL VESSELS TO BE NORMAL. XR CHEST PORTABLE    Result Date: 7/10/2022  XR CHEST PORTABLE : 7/10/2022 CLINICAL HISTORY:  confusion, slurred speech .  COMPARISON: 6/10/2021 TECHNIQUE: A portable upright AP radiograph of the chest was obtained. FINDINGS: Shallow inspiratory volumes are present without significant infiltrate identified. There is no cardiomegaly, significant pleural effusion, vascular congestion, pneumothorax, or displaced fractures identified. 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 NO EVIDENCE OF ACTIVE CARDIOPULMONARY DISEASE, BY PORTABLE CHEST RADIOGRAPHY. MRI BRAIN WO CONTRAST    Result Date: 7/11/2022  EXAMINATION:  MRI BRAIN. MRA INTRACRANIAL VESSELS. CLINICAL HISTORY:  CVA. COMPARISONS:  NONE AVAILABLE TECHNIQUE:  T1, T2 and diffusion scans. MIP Images. No contrast. FINDINGS:  Scans through the brain demonstrates a considerable amount of ischemic white matter change. There is no evidence of a mass or infarct. There is no hemorrhage or hematoma. There is no midline shift or evidence of increased intracranial pressure. There is mild generalized atrophy. MRA scans demonstrates the internal carotid arteries and the basilar artery to be patent and normal in caliber. The superior cerebral arteries are patent. Middle cerebral and anterior cerebral arteries are patent. No abnormal vascularity is demonstrated. No definite evidence of an aneurysm. 1. MRI SCAN OF THE BRAIN DEMONSTRATES A CONSIDERABLE AMOUNT OF ISCHEMIC WHITE MATTER CHANGE. NO EVIDENCE OF AN ACUTE INFARCT. 2. MRA SCANS DEMONSTRATE INTRACRANIAL VESSELS TO BE NORMAL. US CAROTID ARTERY BILATERAL    Result Date: 7/11/2022  History:  confusion Technique:  US CAROTID ARTERY BILATERAL Comparison: July 27, 2015 Findings: Intimal thickening and calcified plaque are seen in the distal common carotid artery and in the right internal carotid artery. Intimal thickening is seen in the normal mild echogenic plaque is seen in the left internal carotid artery. No increased velocity is seen. Normal antegrade flow is visualized in the vertebral arteries.  Doppler findings: ARTERIAL BLOOD FLOW VELOCITY RIGHT PS                                               Prox CCA 143cm/s             Mid CCA 144cm/s              Dist CCA  130 cm/s              Prox ICA 131cm/s              Mid ICA 119cm/s              Dist ICA 100cm/s              Prox ECA 164cm/s              Dist VERT 56cm/s              Bulb ICA/CCA 0.9 LEFT PS Prox CCA 162cm/s              Mid CCA 169cm/s              Dist CCA 153cm/s              Prox ICA 152cm/s              Mid ICA 155cm/s              Dist ICA 90cm/s              Prox  cm/s              Prox VERT 78cm/s              Bulb ICA/CCA 0.9     Impression: 1. Less than 50% stenosis on the right   2. Less than 50% stenosis seen on the left Validated velocity measurements with angiographic measurements, velocity criteria are extrapolated from diameter data as defined by the Society of radiologist in ultrasound consensus conference radiology 2003; 229; 340-346. Updated recommendations for carotid stenosis interpretation criteria-October 2021 by Patrica Mendieta 157. Assessment//Plan           Hospital Problems           Last Modified POA    * (Principal) Change in mental state 7/10/2022 Yes      TIA vs CVA  Assessment & Plan  7/11: MRI is negative, once cleared by neurology pt can be discharged, spoke with son at bedside, no overnight events, no new complaints.   Electronically signed by Candido Meyers MD on 7/11/22 at 2:01 PM EDT

## 2022-07-11 NOTE — PROGRESS NOTES
Pt alert and orient to person, place, situation, and month but not year. Pt was unable to tell me who was in the room with her upon admission. When prompted she was able to tell me her son's name is [place]  but the answer was delayed. When asked who Maguire Irish was patient repeated the name but could not tell me who Maguire Irish was nor did she remember Maguire Irish being in the room.

## 2022-07-12 LAB
LV EF: 60 %
LVEF MODALITY: NORMAL

## 2022-07-12 PROCEDURE — 99233 SBSQ HOSP IP/OBS HIGH 50: CPT | Performed by: INTERNAL MEDICINE

## 2022-07-12 PROCEDURE — 6360000002 HC RX W HCPCS: Performed by: INTERNAL MEDICINE

## 2022-07-12 PROCEDURE — 1210000000 HC MED SURG R&B

## 2022-07-12 PROCEDURE — 95816 EEG AWAKE AND DROWSY: CPT

## 2022-07-12 PROCEDURE — 6370000000 HC RX 637 (ALT 250 FOR IP): Performed by: FAMILY MEDICINE

## 2022-07-12 PROCEDURE — 6370000000 HC RX 637 (ALT 250 FOR IP): Performed by: INTERNAL MEDICINE

## 2022-07-12 PROCEDURE — 93306 TTE W/DOPPLER COMPLETE: CPT

## 2022-07-12 PROCEDURE — 99233 SBSQ HOSP IP/OBS HIGH 50: CPT | Performed by: PSYCHIATRY & NEUROLOGY

## 2022-07-12 PROCEDURE — APPSS30 APP SPLIT SHARED TIME 16-30 MINUTES: Performed by: NURSE PRACTITIONER

## 2022-07-12 PROCEDURE — 2580000003 HC RX 258: Performed by: INTERNAL MEDICINE

## 2022-07-12 RX ORDER — VALSARTAN 40 MG/1
160 TABLET ORAL ONCE
Status: COMPLETED | OUTPATIENT
Start: 2022-07-12 | End: 2022-07-12

## 2022-07-12 RX ORDER — CLONIDINE HYDROCHLORIDE 0.1 MG/1
0.1 TABLET ORAL EVERY 4 HOURS PRN
Status: DISCONTINUED | OUTPATIENT
Start: 2022-07-12 | End: 2022-07-13 | Stop reason: HOSPADM

## 2022-07-12 RX ORDER — VALSARTAN 160 MG/1
320 TABLET ORAL DAILY
Status: DISCONTINUED | OUTPATIENT
Start: 2022-07-13 | End: 2022-07-13

## 2022-07-12 RX ORDER — VALSARTAN 320 MG/1
320 TABLET ORAL DAILY
Qty: 30 TABLET | Refills: 0 | Status: SHIPPED | OUTPATIENT
Start: 2022-07-12 | End: 2022-07-13 | Stop reason: HOSPADM

## 2022-07-12 RX ADMIN — SERTRALINE HYDROCHLORIDE 50 MG: 50 TABLET ORAL at 07:44

## 2022-07-12 RX ADMIN — Medication 10 ML: at 21:04

## 2022-07-12 RX ADMIN — Medication 10 ML: at 08:25

## 2022-07-12 RX ADMIN — CLONIDINE HYDROCHLORIDE 0.1 MG: 0.1 TABLET ORAL at 08:49

## 2022-07-12 RX ADMIN — VALSARTAN 160 MG: 160 TABLET, FILM COATED ORAL at 07:44

## 2022-07-12 RX ADMIN — ENOXAPARIN SODIUM 40 MG: 100 INJECTION SUBCUTANEOUS at 07:44

## 2022-07-12 RX ADMIN — FAMOTIDINE 20 MG: 20 TABLET, FILM COATED ORAL at 07:44

## 2022-07-12 RX ADMIN — VALSARTAN 160 MG: 40 TABLET, FILM COATED ORAL at 10:28

## 2022-07-12 RX ADMIN — ASPIRIN 325 MG: 325 TABLET ORAL at 07:44

## 2022-07-12 RX ADMIN — CARVEDILOL 6.25 MG: 3.12 TABLET, FILM COATED ORAL at 21:04

## 2022-07-12 RX ADMIN — ATORVASTATIN CALCIUM 20 MG: 20 TABLET, FILM COATED ORAL at 21:04

## 2022-07-12 RX ADMIN — CARVEDILOL 6.25 MG: 3.12 TABLET, FILM COATED ORAL at 07:44

## 2022-07-12 ASSESSMENT — ENCOUNTER SYMPTOMS
COUGH: 0
SHORTNESS OF BREATH: 0
TROUBLE SWALLOWING: 0
COLOR CHANGE: 0
VOMITING: 0
WHEEZING: 0
CHEST TIGHTNESS: 0
NAUSEA: 0

## 2022-07-12 NOTE — PROGRESS NOTES
Cardiology progress note  Patient: Kaia Mary Lou  Unit/Bed: Y094/I832-76  YOB: 1935  MRN: [id number]  Acct: [de-identified]   Admitting Diagnosis: Change in mental state [R41.82]  Confusion and disorientation [R41.0]  Date:  7/10/2022  Hospital Day: 2      Chief Complaint:  Confusion    History of Present Illness: This is a pleasant 81-year-old  female with past medical history significant for coronary artery disease status post CABG approximately 15 years ago, hypertension and history of anxiety who presented to ER yesterday with report of increasing confusion. Patient's son is at bedside currently and is providing history for the patient. Apparently patient resides with her  and a daughter and over the past couple days family has noticed the patient is increasingly confused. At her baseline she is alert and oriented x3. Apparently on day of presentation patient was not even able to carry on a conversation. Due to these mental status changes she was brought to ER for further evaluation. On presentation to ER, blood pressure 155/56, heart rate 80, respiratory rate 18, pulse ox 95%. Sodium 137, potassium low normal at 3.4, chloride 98, total CO2 of 26, BUN elevated 47, creatinine elevated 1.37, GFR low at 36.6, glucose 116. Troponin negative less than 0.010. Ethanol less than 10. Urine drug screen was positive for benzodiazepines but patient does take Valium at home. WBC 9.8, hemoglobin 13.0, hematocrit 39.4, platelets 259. Urinalysis unremarkable. Chest x-ray revealed no evidence of active cardiopulmonary disease. CT of the brain showed no acute intracranial process or significant change from 5/6/2020. CTA of the head and neck on 7/10/2022 showed no flow-limiting stenosis, dissection or large vessel intracranial arterial occlusion. She was admitted for further evaluation. Cardiology has been consulted for reported chest pain.   However, patient denies any chest pain complaints and patient's son at bedside denies any report of chest pain. Per her son, patient is slightly less confused at this time but still not at her baseline. At her baseline she is bilingual and speaks [place] fluently however she is only speaking in Khmer at this time and appears to have difficulty understanding [place]. It appears that patient previously followed with Dr. Flaco Zavala who is now retired with last office appointment in August 2021. She had most recent stress test on 6/10/2021 which was negative for ischemia. No recent echocardiogram on record. Allergy evaluation is in progress and patient underwent MRI and MRA of the brain today with MRI showing no evidence of acute infarct and MRI of the brain demonstrating normal intracranial vessels. Bilateral carotid ultrasound revealed less than 50% bilateral ICA stenosis.  ====================  Hospital course  7/12/2022  Patient laying in bed looks comfortable  Denies chest pain  Telemetry sinus rhythm  Echocardiogram this morning normal EF  From cardiology standpoint patient stable to be discharged.   Will follow patient up in clinic in 3 to 4 weeks    Allergies   Allergen Reactions    Carbapenems     Cephalosporins     Hydralazine Other (See Comments)    Pcn [Penicillins]        Current Facility-Administered Medications   Medication Dose Route Frequency Provider Last Rate Last Admin    enoxaparin (LOVENOX) injection 40 mg  40 mg SubCUTAneous Daily Stephen Benson MD        levoFLOXacin (LEVAQUIN) 750 MG/150ML infusion 750 mg  750 mg IntraVENous Q24H Stephen Benson MD   Stopped at 07/11/22 2245    atorvastatin (LIPITOR) tablet 20 mg  20 mg Oral Nightly Misbah Clement MD   20 mg at 07/11/22 2047    carvedilol (COREG) tablet 6.25 mg  6.25 mg Oral BID Misbah Clement MD   6.25 mg at 07/11/22 2046    diazePAM (VALIUM) tablet 2.5 mg  2.5 mg Oral Q8H PRN Misbah Clement MD        famotidine (PEPCID) tablet 20 mg  20 mg Oral Daily Misbah Clement MD   20 mg at 07/11/22 0848    ondansetron (ZOFRAN) tablet 4 mg  4 mg Oral Q12H PRN Javi Higuera MD        sertraline (ZOLOFT) tablet 50 mg  50 mg Oral Daily Javi Higuera MD   50 mg at 07/11/22 0848    sodium chloride flush 0.9 % injection 5-40 mL  5-40 mL IntraVENous 2 times per day Javi Higuera MD   10 mL at 07/11/22 2047    sodium chloride flush 0.9 % injection 5-40 mL  5-40 mL IntraVENous PRN Javi Higuera MD        0.9 % sodium chloride infusion   IntraVENous PRN Javi Higuera MD        ondansetron [facility]) injection 4 mg  4 mg IntraVENous Q6H PRN Javi Higuera MD        acetaminophen (TYLENOL) tablet 650 mg  650 mg Oral Q6H PRN Javi Higuera MD        Or    acetaminophen (TYLENOL) suppository 650 mg  650 mg Rectal Q6H PRN Javi Higuera MD        polyethylene glycol [facility]) packet 17 g  17 g Oral Daily PRN Javi Higuera MD        aspirin tablet 325 mg  325 mg Oral Daily Javi Higuera MD   325 mg at 07/11/22 0848    potassium chloride (KLOR-CON M) extended release tablet 40 mEq  40 mEq Oral PRN Javi Higuera MD        Or    potassium bicarb-citric acid (EFFER-K) effervescent tablet 40 mEq  40 mEq Oral PRN Javi Higuera MD        Or    potassium chloride 10 mEq/100 mL IVPB (Peripheral Line)  10 mEq IntraVENous PRN Javi Higuera MD        nitroGLYCERIN (NITROSTAT) SL tablet 0.4 mg  0.4 mg SubLINGual Q5 Min PRN Javi Higuera MD        0.9 % sodium chloride infusion   IntraVENous Continuous Javi Higuera MD 75 mL/hr at 07/11/22 0618 Rate Verify at 07/11/22 0618    valsartan (DIOVAN) tablet 160 mg  160 mg Oral Daily Javi Higuera MD   160 mg at 07/11/22 0848       PMHx:  Past Medical History:   Diagnosis Date    Anxiety     Cirrhosis ([facility] [address]     Coronary artery disease involving native coronary artery of native heart without angina pectoris 1/8/2018    HTN (hypertension)        PSHx:  Past Surgical History:   Procedure Laterality Date    CORONARY ARTERY BYPASS GRAFT         Social Hx:  Social History Negative for congestion. Respiratory: Negative for chest tightness and shortness of breath. Cardiovascular: Negative for chest pain, palpitations and leg swelling. Gastrointestinal: Negative for nausea and vomiting. Genitourinary: Negative for difficulty urinating and dysuria. Musculoskeletal: Negative for arthralgias. Skin: Negative for color change. Neurological: Negative for dizziness, syncope and light-headedness. Psychiatric/Behavioral: Negative for agitation. Physical Examination:    BP (!) 170/46   Pulse 82   Temp 98.4 °F (36.9 °C) (Oral)   Resp 12   Ht 5' 2\" (1.575 m)   Wt 159 lb (72.1 kg)   SpO2 97%   BMI 29.08 kg/m²    Physical Exam  Constitutional:       General: She is not in acute distress. Appearance: She is obese. HENT:      Head: Normocephalic and atraumatic. Cardiovascular:      Rate and Rhythm: Normal rate and regular rhythm. Pulmonary:      Effort: Pulmonary effort is normal. No respiratory distress. Breath sounds: No wheezing, rhonchi or rales. Abdominal:      Palpations: Abdomen is soft. Musculoskeletal:         General: Normal range of motion. Cervical back: Normal range of motion and neck supple. Right lower leg: No edema. Left lower leg: No edema. Skin:     General: Skin is warm and dry. Neurological:      General: No focal deficit present. Mental Status: She is alert. Cranial Nerves: No cranial nerve deficit.    Psychiatric:         Mood and Affect: Mood normal.         Behavior: Behavior normal.         LABS:  CBC:  Lab Results   Component Value Date/Time    WBC 10.5 07/11/2022 06:01 AM    RBC 4.07 07/11/2022 06:01 AM    HGB 12.2 07/11/2022 06:01 AM    HCT 36.8 07/11/2022 06:01 AM    MCV 90.6 07/11/2022 06:01 AM    MCH 29.9 07/11/2022 06:01 AM    MCHC 33.0 07/11/2022 06:01 AM    RDW 14.5 07/11/2022 06:01 AM     07/11/2022 06:01 AM    MPV 8.7 09/17/2014 08:30 AM     CBC with Differential:   Lab Results Component Value Date/Time    WBC 10.5 07/11/2022 06:01 AM    RBC 4.07 07/11/2022 06:01 AM    HGB 12.2 07/11/2022 06:01 AM    HCT 36.8 07/11/2022 06:01 AM     07/11/2022 06:01 AM    MCV 90.6 07/11/2022 06:01 AM    MCH 29.9 07/11/2022 06:01 AM    MCHC 33.0 07/11/2022 06:01 AM    RDW 14.5 07/11/2022 06:01 AM    LYMPHOPCT 26.2 07/10/2022 04:00 PM    MONOPCT 13.2 07/10/2022 04:00 PM    BASOPCT 0.7 07/10/2022 04:00 PM    MONOSABS 1.3 07/10/2022 04:00 PM    LYMPHSABS 2.6 07/10/2022 04:00 PM    EOSABS 0.1 07/10/2022 04:00 PM    BASOSABS 0.1 07/10/2022 04:00 PM     CMP:    Lab Results   Component Value Date/Time     07/11/2022 06:01 AM    K 3.4 07/11/2022 06:01 AM     07/11/2022 06:01 AM    CO2 24 07/11/2022 06:01 AM    BUN 29 07/11/2022 06:01 AM    CREATININE 0.62 07/11/2022 06:01 AM    GFRAA >60.0 07/11/2022 06:01 AM    LABGLOM >60.0 07/11/2022 06:01 AM    GLUCOSE 109 07/11/2022 06:01 AM    PROT 7.3 07/10/2022 04:00 PM    LABALBU 4.0 07/10/2022 04:00 PM    CALCIUM 8.8 07/11/2022 06:01 AM    BILITOT 0.4 07/10/2022 04:00 PM    ALKPHOS 87 07/10/2022 04:00 PM    AST 13 07/10/2022 04:00 PM    ALT 8 07/10/2022 04:00 PM     BMP:    Lab Results   Component Value Date/Time     07/11/2022 06:01 AM    K 3.4 07/11/2022 06:01 AM     07/11/2022 06:01 AM    CO2 24 07/11/2022 06:01 AM    BUN 29 07/11/2022 06:01 AM    LABALBU 4.0 07/10/2022 04:00 PM    CREATININE 0.62 07/11/2022 06:01 AM    CALCIUM 8.8 07/11/2022 06:01 AM    GFRAA >60.0 07/11/2022 06:01 AM    LABGLOM >60.0 07/11/2022 06:01 AM    GLUCOSE 109 07/11/2022 06:01 AM     Magnesium:    Lab Results   Component Value Date/Time    MG 1.6 07/11/2022 06:01 AM     Troponin:    Lab Results   Component Value Date/Time    TROPONINI <0.010 07/10/2022 04:00 PM       Radiology:  CTA Head W WO Contrast    Result Date: 7/10/2022  CTA HEAD W WO CONTRAST, CTA NECK W WO CONTRAST : 7/10/2022 CLINICAL HISTORY:  slurred speech .  COMPARISON: Head CT from earlier 7/10/2022 and cervical spine CT 4/29/2019. TECHNIQUE: Spiral images were obtained of the head and neck after the uneventful intravenous administration of approximately 100 mL of Isovue-300 contrast with CTA protocol. Internal carotid narrowings are estimated using NASCET criteria. Routine and volume rendered images were obtained on a 3-dimensional workstation. All CT scans at this facility use dose modulation, iterative reconstruction, and/or weight based dosing when appropriate to reduce radiation dose to as low as reasonably achievable. HEAD CTA FINDINGS: There is no large vessel intracranial arterial occlusion, flow-limiting stenosis, intracranial aneurysm, evidence of vascular malformation, dural venous sinus thrombosis, or developmental variations of concern identified. Mild calcific plaquing is present of the cavernous carotids and the V4 segment of a developmentally dominant left vertebral artery. NECK CTA FINDINGS: There is no flow-limiting stenosis, dissection, or acute findings identified in the neck. Mild atherosclerotic plaquing is noted in the aortic arch, great vessel origins and right carotid bulb. The left vertebral artery is developmentally dominant. Small thyroid nodules measuring up to just over 1 cm and mild degenerative changes of the cervical spine appears similar to 4/29/2019. FINAL IMPRESSION: NO FLOW-LIMITING STENOSIS, DISSECTION OR LARGE VESSEL INTRACRANIAL ARTERIAL OCCLUSION IDENTIFIED. CT Head WO Contrast    Result Date: 7/10/2022  CT HEAD WO CONTRAST : 7/10/2022 CLINICAL HISTORY:  slurred speech . COMPARISON: 5/6/2020. TECHNIQUE: Spiral unenhanced images were obtained of the head, with routine multiplanar reconstructions performed. All CT scans at this facility use dose modulation, iterative reconstruction, and/or weight based dosing when appropriate to reduce radiation dose to as low as reasonably achievable.  FINDINGS: There is no intracranial hemorrhage, mass effect, midline INTRACRANIAL ARTERIAL OCCLUSION IDENTIFIED. MRA HEAD WO CONTRAST    Result Date: 7/11/2022  EXAMINATION:  MRI BRAIN. MRA INTRACRANIAL VESSELS. CLINICAL HISTORY:  CVA. COMPARISONS:  NONE AVAILABLE TECHNIQUE:  T1, T2 and diffusion scans. MIP Images. No contrast. FINDINGS:  Scans through the brain demonstrates a considerable amount of ischemic white matter change. There is no evidence of a mass or infarct. There is no hemorrhage or hematoma. There is no midline shift or evidence of increased intracranial pressure. There is mild generalized atrophy. MRA scans demonstrates the internal carotid arteries and the basilar artery to be patent and normal in caliber. The superior cerebral arteries are patent. Middle cerebral and anterior cerebral arteries are patent. No abnormal vascularity is demonstrated. No definite evidence of an aneurysm. 1. MRI SCAN OF THE BRAIN DEMONSTRATES A CONSIDERABLE AMOUNT OF ISCHEMIC WHITE MATTER CHANGE. NO EVIDENCE OF AN ACUTE INFARCT. 2. MRA SCANS DEMONSTRATE INTRACRANIAL VESSELS TO BE NORMAL. XR CHEST PORTABLE    Result Date: 7/10/2022  XR CHEST PORTABLE : 7/10/2022 CLINICAL HISTORY:  confusion, slurred speech . COMPARISON: 6/10/2021 TECHNIQUE: A portable upright AP radiograph of the chest was obtained. FINDINGS: Shallow inspiratory volumes are present without significant infiltrate identified. There is no cardiomegaly, significant pleural effusion, vascular congestion, pneumothorax, or displaced fractures identified. 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 NO EVIDENCE OF ACTIVE CARDIOPULMONARY DISEASE, BY PORTABLE CHEST RADIOGRAPHY. MRI BRAIN WO CONTRAST    Result Date: 7/11/2022  EXAMINATION:  MRI BRAIN. MRA INTRACRANIAL VESSELS. CLINICAL HISTORY:  CVA. COMPARISONS:  NONE AVAILABLE TECHNIQUE:  T1, T2 and diffusion scans. MIP Images. No contrast. FINDINGS:  Scans through the brain demonstrates a considerable amount of ischemic white matter change.  There is no evidence of a mass or infarct. There is no hemorrhage or hematoma. There is no midline shift or evidence of increased intracranial pressure. There is mild generalized atrophy. MRA scans demonstrates the internal carotid arteries and the basilar artery to be patent and normal in caliber. The superior cerebral arteries are patent. Middle cerebral and anterior cerebral arteries are patent. No abnormal vascularity is demonstrated. No definite evidence of an aneurysm. 1. MRI SCAN OF THE BRAIN DEMONSTRATES A CONSIDERABLE AMOUNT OF ISCHEMIC WHITE MATTER CHANGE. NO EVIDENCE OF AN ACUTE INFARCT. 2. MRA SCANS DEMONSTRATE INTRACRANIAL VESSELS TO BE NORMAL. Stress test 6/11/21:  Narrative   EXAMINATION:  GRADED EXERCISE MYOCARDIAL PERFUSION IMAGING       CLINICAL HISTORY:  CHEST PAIN       COMPARISONS:  NONE AVAILABLE       TECHNIQUE:  10.0 mCi noninjected patient was then stressed according to standard Lee protocol. Additional 32.0 mCi mildly injected and poststress images were obtained.       FINDINGS:  Underlying electrocardiogram a sinus rhythm heart with right bundle branch block rate is 100 at rest. Blood pressure 184/79. Total exercise time is 3 minutes and 3 seconds attaining a peak heart rate of 118 bpm peak blood pressure 210/92    representing 98% of age-matched maximum predicted heart rate giving her a functional capacity of 2.4 metabolic equivalents.       Patient had no complaints of angina. Patient remained in sinus rhythm throughout. She does have underlying right bundle branch block.       The tomographic images reveal transient ischemic dilatation score of 1.17.       Left ventricular ejection fraction 80%.       Perfusion scan demonstrates normal tracer uptake throughout the left ventricle segments.           Impression   POOR FUNCTIONAL CAPACITY FOR AGE.       NO EXERCISE-INDUCED ISCHEMIA NOR ARRHYTHMIAS.       NORMAL MYOCARDIAL PERFUSION IMAGING.       NORMAL LV EJECTION FRACTION. EKG 7/11/22: SR 78, RBBB, ST depression with T wave inversion V1-V3, QTc 499ms    Telemetry 7/11/22:       Assessment:    Active Hospital Problems    Diagnosis Date Noted    Confusion and disorientation [R41.0]      Priority: High    Hx of CABG [Z95.1]      Priority: High    Hypertension [I10]      Priority: High    Change in mental state [R41.82] 07/10/2022     Priority: Medium     Assessment and plan:  Chest pain. Unknown quality pt denies chest pain, EKG RBBB without signs of ischemia,   History of CAD status post CABG in the past  Altered mental status  Hypertension  Hyperlipidemia    TTE 7/12/2022 EF 60% no major valvular disease    Plan:  Continue telemetry  Continue aspirin and statin  Obtain an echocardiogram  Continue Coreg 6.25 mg p.o. twice daily  Continue valsartan 160 mg daily  Please keep potassium between 4 and 5 magnesium above 2  Please keep hemoglobin above 8  Management of altered mental status as per primary team  From cardiology standpoint patient stable to be discharged.   Cardiology will sign off please reconsult if further questions arise  I will follow her up in clinic in 3 to 4 weeks after discharge      Electronically signed by Kyle Taveras MD on 7/12/2022 at 7:35 AM

## 2022-07-12 NOTE — PROGRESS NOTES
The patient is not nervous/anxious. Physical Exam  Vitals and nursing note reviewed. Constitutional:       General: She is not in acute distress. Appearance: She is not diaphoretic. HENT:      Head: Normocephalic. Eyes:      General: No visual field deficit. Pupils: Pupils are equal, round, and reactive to light. Cardiovascular:      Rate and Rhythm: Normal rate and regular rhythm. Pulmonary:      Effort: Pulmonary effort is normal. No respiratory distress. Breath sounds: Normal breath sounds. Abdominal:      General: Bowel sounds are normal. There is no distension. Palpations: Abdomen is soft. Tenderness: There is no abdominal tenderness. Skin:     General: Skin is warm and dry. Neurological:      General: No focal deficit present. Mental Status: She is alert and oriented to person, place, and time. Mental status is at baseline. Cranial Nerves: No dysarthria or facial asymmetry. Motor: No weakness, tremor or seizure activity.        Focal examination except for some word finding difficulty        Medications:  Reviewed    Infusion Medications:    sodium chloride       Scheduled Medications:    [START ON 7/13/2022] valsartan  320 mg Oral Daily    enoxaparin  40 mg SubCUTAneous Daily    atorvastatin  20 mg Oral Nightly    carvedilol  6.25 mg Oral BID    famotidine  20 mg Oral Daily    sertraline  50 mg Oral Daily    sodium chloride flush  5-40 mL IntraVENous 2 times per day    aspirin  325 mg Oral Daily     PRN Meds: cloNIDine, diazePAM, ondansetron, sodium chloride flush, sodium chloride, [DISCONTINUED] ondansetron **OR** ondansetron, acetaminophen **OR** acetaminophen, polyethylene glycol, potassium chloride **OR** potassium alternative oral replacement **OR** potassium chloride, nitroGLYCERIN    Labs:   Recent Labs     07/10/22  1600 07/11/22  0601   WBC 9.8 10.5   HGB 13.0 12.2   HCT 39.4 36.8*    245     Recent Labs     07/10/22  1600 07/10/22  1611 07/11/22  0601     --  140   K 3.4  --  3.4   CL 98  --  103   CO2 26  --  24   BUN 47*  --  29*   CREATININE 1.37* 1.4* 0.62   CALCIUM 9.3  --  8.8     Recent Labs     07/10/22  1600   AST 13   ALT 8   BILITOT 0.4   ALKPHOS 87     Recent Labs     07/10/22  1600   INR 1.1     Recent Labs     07/10/22  1600   TROPONINI <0.010       Urinalysis:   Lab Results   Component Value Date/Time    NITRU Negative 07/10/2022 04:00 PM    WBCUA  10/15/2021 01:07 PM    BACTERIA FEW 10/15/2021 01:07 PM    RBCUA None seen 06/10/2021 03:45 PM    BLOODU Negative 07/10/2022 04:00 PM    SPECGRAV 1.045 07/10/2022 04:00 PM    GLUCOSEU Negative 07/10/2022 04:00 PM       Radiology:   Most recent    EEG No valid procedures specified. MRI of Brain No results found for this or any previous visit. Results for orders placed during the hospital encounter of 07/10/22    MRI BRAIN WO CONTRAST    Narrative  EXAMINATION:  MRI BRAIN. MRA INTRACRANIAL VESSELS. CLINICAL HISTORY:  CVA. COMPARISONS:  NONE AVAILABLE    TECHNIQUE:  T1, T2 and diffusion scans. MIP Images. No contrast.    FINDINGS:  Scans through the brain demonstrates a considerable amount of ischemic white matter change. There is no evidence of a mass or infarct. There is no hemorrhage or hematoma. There is no midline shift or evidence of increased intracranial  pressure. There is mild generalized atrophy. MRA scans demonstrates the internal carotid arteries and the basilar artery to be patent and normal in caliber. The superior cerebral arteries are patent. Middle cerebral and anterior cerebral arteries are patent. No abnormal vascularity is demonstrated. No definite evidence of an aneurysm. Impression  1. MRI SCAN OF THE BRAIN DEMONSTRATES A CONSIDERABLE AMOUNT OF ISCHEMIC WHITE MATTER CHANGE. NO EVIDENCE OF AN ACUTE INFARCT. 2. MRA SCANS DEMONSTRATE INTRACRANIAL VESSELS TO BE NORMAL.                             MRA of the Head and Neck: No results found for this or any previous visit. No results found for this or any previous visit. Results for orders placed during the hospital encounter of 07/10/22    MRA HEAD WO CONTRAST    Narrative  EXAMINATION:  MRI BRAIN. MRA INTRACRANIAL VESSELS. CLINICAL HISTORY:  CVA. COMPARISONS:  NONE AVAILABLE    TECHNIQUE:  T1, T2 and diffusion scans. MIP Images. No contrast.    FINDINGS:  Scans through the brain demonstrates a considerable amount of ischemic white matter change. There is no evidence of a mass or infarct. There is no hemorrhage or hematoma. There is no midline shift or evidence of increased intracranial  pressure. There is mild generalized atrophy. MRA scans demonstrates the internal carotid arteries and the basilar artery to be patent and normal in caliber. The superior cerebral arteries are patent. Middle cerebral and anterior cerebral arteries are patent. No abnormal vascularity is demonstrated. No definite evidence of an aneurysm. Impression  1. MRI SCAN OF THE BRAIN DEMONSTRATES A CONSIDERABLE AMOUNT OF ISCHEMIC WHITE MATTER CHANGE. NO EVIDENCE OF AN ACUTE INFARCT. 2. MRA SCANS DEMONSTRATE INTRACRANIAL VESSELS TO BE NORMAL. CT of the Head: Results for orders placed during the hospital encounter of 07/10/22    CT Head WO Contrast    Narrative  CT HEAD WO CONTRAST : 7/10/2022    CLINICAL HISTORY:  slurred speech . COMPARISON: 5/6/2020. TECHNIQUE: Spiral unenhanced images were obtained of the head, with routine multiplanar reconstructions performed. All CT scans at this facility use dose modulation, iterative reconstruction, and/or weight based dosing when appropriate to reduce radiation dose to as low as reasonably achievable. FINDINGS:    There is no intracranial hemorrhage, mass effect, midline shift, extra-axial collection, evidence of hydrocephalus, recent ischemic infarct, or skull fracture identified.     Mild generalized cerebral volume loss and moderate patchy white matter changes are again noted. The mastoid air cells and visualized paranasal sinuses are essentially clear. Impression  NO ACUTE INTRACRANIAL PROCESSOR SIGNIFICANT CHANGE FROM 5/6/2020 IDENTIFIED. No results found for this or any previous visit. No results found for this or any previous visit. Carotid duplex: No results found for this or any previous visit. No results found for this or any previous visit. Results for orders placed during the hospital encounter of 07/10/22    US CAROTID ARTERY BILATERAL    Narrative  History:  confusion    Technique:  US CAROTID ARTERY BILATERAL    Comparison: July 27, 2015    Findings:  Intimal thickening and calcified plaque are seen in the distal common carotid artery and in the right internal carotid artery. Intimal thickening is seen in the normal mild echogenic plaque is seen in the left internal carotid artery. No increased  velocity is seen. Normal antegrade flow is visualized in the vertebral arteries. Doppler findings:  ARTERIAL BLOOD FLOW VELOCITY    RIGHT PS    Prox CCA 143cm/s  Mid CCA 144cm/s  Dist CCA  130 cm/s  Prox ICA 131cm/s  Mid ICA 119cm/s  Dist ICA 100cm/s  Prox ECA 164cm/s  Dist VERT 56cm/s  Bulb  ICA/CCA 0.9    LEFT PS    Prox CCA 162cm/s  Mid CCA 169cm/s  Dist CCA 153cm/s  Prox ICA 152cm/s  Mid ICA 155cm/s  Dist ICA 90cm/s  Prox  cm/s  Prox VERT 78cm/s  Bulb  ICA/CCA 0.9    Impression  Impression: 1. Less than 50% stenosis on the right   2. Less than 50% stenosis seen on the left    Validated velocity measurements with angiographic measurements, velocity criteria are extrapolated from diameter data as defined by the Society of radiologist in ultrasound consensus conference radiology 2003; 229; 340-346. Updated recommendations for carotid stenosis interpretation criteria-October 2021 by Partica Mendieta 157.       Echo No results found for this or any previous visit. Assessment/Plan:    7/11/22:  Aphasia with word finding issues as well as following commands. She is able to comprehend though takes her own time. She is not frustrated regarding this. This either is a right parietal lobe stroke or AP or posterior left parietal stroke. If the MRI surprisingly negative then this may be an aftermath of her recent social event that occurred with loss of her brother and may have affected her. We will arrange for an EEG to make sure there is not status epilepticus either. Patient does have some risk factors for some vascular disease and high risk for atrial fibrillation. Was on aspirin but no other notable anticoagulation. She is otherwise very functional.  Next of this consultation includes my consultation with emergency room. 7/12/22:  Aphasia resolved  MRI of the brain shows considerable ischemic white matter changes but no acute infarct. MRA of the head negative. Carotid duplex with less than 50% stenosis bilaterally. DALILA, resolved  EEG pending  Blood pressure elevated. Will need ongoing attention of blood pressure control. I have personally performed a face to face diagnostic evaluation on this patient, reviewed all data and investigations, and am the sole provider of all clinical decisions on the neurological status of this patient. Patient is improved with word finding difficulty and speech is better. She is able to answer in English though it may not be baseline. I await her EEG and once this is available and done patient may be discharged and this might have been a psychiatric presentation of event in patient's life. More than 60% time spent on evaluating this patient. Donald Singh MD, [place] Torres, American Board of Psychiatry & Neurology  Board Certified in Vascular Neurology  Board Certified in Neuromuscular Medicine  Certified in Neurorehabilitation     Collaborating physicians: Dr Neo Singh    Electronically signed by Ciera Quiroz Edson Heath, TORSTEN - CNP on 7/12/2022 at 10:47 AM

## 2022-07-12 NOTE — DISCHARGE SUMMARY
Discharge Summary    Date: 7/12/2022  Patient Name: Delia Clement YOB: 1935 Age: 82 y.o. Admit Date: 7/10/2022  Discharge Date: 7/12/2022  Discharge Condition: Fair    Admission Diagnosis  Change in mental state (R41.82); Confusion and disorientation (R41.0)     Discharge Diagnosis  Principal Problem: Change in mental stateActive Problems: Confusion and disorientation Hx of CABG Hypertension AphasiaResolved Problems: * No resolved hospital problems. [facility] Stay  Narrative of Hospital Course:  Patient comes with strokelike symptoms. MRI was negative for acute pathology. Patient noted elevated blood pressure. Reported BP was 144/70, pending EEG, cleared by neuro to be discharged    Consultants:  Ricardo Horne    Surgeries/procedures Performed:       Treatments:           Discharge Plan/Disposition:  Home    Hospital/Incidental Findings Requiring Follow Up:    Patient Instructions:    Diet:    Activity:  For number of days (if applicable): Other Instructions:    Provider Follow-Up:   No follow-ups on file.      Significant Diagnostic Studies:    Recent Labs:  Admission on 07/10/2022Sodium                                        Date: 07/10/2022Value: 137         Ref range: 135 - 144 mEq/L    Status: FinalPotassium                                     Date: 07/10/2022Value: 3.4         Ref range: 3.4 - 4.9 mEq/L    Status: FinalChloride                                      Date: 07/10/2022Value: 98          Ref range: 95 - 107 mEq/L     Status: FinalCO2                                           Date: 07/10/2022Value: 26          Ref range: 20 - 31 mEq/L      Status: FinalAnion Gap                                     Date: 07/10/2022Value: 13          Ref range: 9 - 15 mEq/L       Status: FinalGlucose                                       Date: 07/10/2022Value: 116*        Ref range: 70 - 99 mg/dL      Status: FinalBUN Date: 07/10/2022Value: 52*         Ref range: 8 - 23 mg/dL       Status: FinalCREATININE                                    Date: 07/10/2022Value: 1.37*       Ref range: 0.50 - 0.90 mg/dL  Status: FinalGFR Non-                      Date: 07/10/2022Value: 36.6*       Ref range: >60                Status: Final              Comment: >60 mL/min/1.73m2 EGFR, calc. for ages 25 and older using theMDRD formula (not corrected for weight), is valid for stablerenal function. GFR                           Date: 07/10/2022Value: 44.3*       Ref range: >60                Status: Final              Comment: >60 mL/min/1.73m2 EGFR, calc. for ages 25 and older using theMDRD formula (not corrected for weight), is valid for stablerenal function. Calcium                                       Date: 07/10/2022Value: 9.3         Ref range: 8.5 - 9.9 mg/dL    Status: FinalTotal Protein                                 Date: 07/10/2022Value: 7.3         Ref range: 6.3 - 8.0 g/dL     Status: FinalAlbumin                                       Date: 07/10/2022Value: 4.0         Ref range: 3.5 - 4.6 g/dL     Status: FinalTotal Bilirubin                               Date: 07/10/2022Value: 0.4         Ref range: 0.2 - 0.7 mg/dL    Status: FinalAlkaline Phosphatase                          Date: 07/10/2022Value: 87          Ref range: 40 - 130 U/L       Status: FinalALT                                           Date: 07/10/2022Value: 8           Ref range: 0 - 33 U/L         Status: FinalAST                                           Date: 07/10/2022Value: 13          Ref range: 0 - 35 U/L         Status: FinalGlobulin                                      Date: 07/10/2022Value: 3.3         Ref range: 2.3 - 3.5 g/dL     Status: [address] [facility]                                           Date: 07/10/2022Value: 9.8         Ref range: 4.8 - 10.8 K/uL    Status: FinalRB Date: 07/10/2022Value: 4.40        Ref range: 4.20 - 5.40 M/uL   Status: FinalHemoglobin                                    Date: 07/10/2022Value: 13.0        Ref range: 12.0 - 16.0 g/dL   Status: FinalHematocrit                                    Date: 07/10/2022Value: 39.4        Ref range: 37.0 - 47.0 %      Status: FinalMCV                                           Date: 07/10/2022Value: 89.5        Ref range: 82.0 - 100.0 fL    Status: [address]                                           Date: 07/10/2022Value: 29.4        Ref range: 27.0 - 31.3 pg     Status: [address]                                          Date: 07/10/2022Value: 32.9*       Ref range: 33.0 - 37.0 %      Status: FinalRDW                                           Date: 07/10/2022Value: 14.5        Ref range: 11.5 - 14.5 %      Status: FinalPlatelets                                     Date: 07/10/2022Value: 280         Ref range: 130 - 400 K/uL     Status: FinalNeutrophils %                                 Date: 07/10/2022Value: 59.3        Ref range: %                  Status: FinalLymphocytes %                                 Date: 07/10/2022Value: 26.2        Ref range: %                  Status: FinalMonocytes %                                   Date: 07/10/2022Value: 13.2        Ref range: %                  Status: FinalEosinophils %                                 Date: 07/10/2022Value: 0.6         Ref range: %                  Status: FinalBasophils %                                   Date: 07/10/2022Value: 0.7         Ref range: %                  Status: FinalNeutrophils Absolute                          Date: 07/10/2022Value: 5.8         Ref range: 1.4 - 6.5 K/uL     Status: FinalLymphocytes Absolute                          Date: 07/10/2022Value: 2.6         Ref range: 1.0 - 4.8 K/uL     Status: FinalMonocytes Absolute                            Date: 07/10/2022Value: 1.3*        Ref range: 0.2 - 0.8 K/uL     Status: FinalEosinophils Absolute                          Date: 07/10/2022Value: 0.1         Ref range: 0.0 - 0.7 K/uL     Status: FinalBasophils Absolute                            Date: 07/10/2022Value: 0.1         Ref range: 0.0 - 0.2 K/uL     Status: FinalProtime                                       Date: 07/10/2022Value: 14.3        Ref range: 12.3 - 14.9 sec    Status: FinalINR                                           Date: 07/10/2022Value: 1.1           Status: FinalaPTT                                          Date: 07/10/2022Value: 24.3*       Ref range: 24.4 - 36.8 sec    Status: Final              Comment: Effective 11/4/2020:Heparin Therapeutic Range: 64.0 - 98.0 seconds. Troponin                                      Date: 07/10/2022Value: <0.010      Ref range: 0.000 - 0.010 ng*  Status: Final              Comment: Methodology by Troponin T. Color, UA                                     Date: 07/10/2022Value: Yellow      Ref range: Straw/Yellow       Status: FinalClarity, UA                                   Date: 07/10/2022Value: Clear       Ref range: Clear              Status: FinalGlucose, Ur                                   Date: 07/10/2022Value: Negative    Ref range: Negative mg/dL     Status: FinalBilirubin Urine                               Date: 07/10/2022Value: Negative    Ref range: Negative           Status: FinalKetones, Urine                                Date: 07/10/2022Value: Negative    Ref range: Negative mg/dL     Status: FinalSpecific Gravity, UA                          Date: 07/10/2022Value: 1.045       Ref range: 1.005 - 1.030      Status: FinalBlood, Urine                                  Date: 07/10/2022Value: Negative    Ref range: Negative           Status: FinalpH, UA                                        Date: 07/10/2022Value: 5.0         Ref range: 5.0 - 9.0          Status: FinalProtein, UA                                   Date: 07/10/2022Value: TRACE*      Ref range: 07/10/2022Value: POSITIVE*   Ref range: Negative < 200 n*  Status: FinalCannabinoid Scrn, Ur                          Date: 07/10/2022Value: Neg         Ref range: Negative < 50 ng*  Status: FinalCocaine Metabolite Screen, Urine              Date: 07/10/2022Value: Neg         Ref range: Negative < 300 n*  Status: FinalOpiate Scrn, Ur                               Date: 07/10/2022Value: Neg         Ref range: Negative < 300 n*  Status: FinalPCP Screen, Urine                             Date: 07/10/2022Value: Neg         Ref range: Negative < 25 ng*  Status: FinalMethadone Screen, Urine                       Date: 07/10/2022Value: Neg         Ref range: Negative <300 ng*  Status: FinalPropoxyphene Scrn, Ur                         Date: 07/10/2022Value: Neg         Ref range: Negative <300 ng*  Status: FinalOxycodone Urine                               Date: 07/10/2022Value: Neg         Ref range: Negative <100 ng*  Status: FinalFENTANYL SCREEN, URINE                        Date: 07/10/2022Value: Neg         Ref range: Negative < 50 ng*  Status: FinalDrug Screen Comment:                          Date: 07/10/2022Value: see below     Status: Final              Comment: This method is a screening test to detect only these drugclasses as part of a medical workup. Confirmatory testingby another method should be ordered if clinically indicated. Ethanol Lvl                                   Date: 07/10/2022Value: <10         Ref range: mg/dL              Status: FinalEthanol percent                               Date: 07/10/2022Value: Not indicated                   Ref range: G/dL               Status: FinalLactic Acid                                   Date: 07/10/2022Value: 1.4         Ref range: 0.5 - 2.2 mmol/L   Status: FinalPOC Glucose                                   Date: 07/10/2022Value: 111*        Ref range: 70 - 99 mg/dl      Status: FinalPerformed on                                  Date: 07/10/2022Value: RiskFemales:  >65 = No Risk          45-65 = Moderate Risk          <45 = High RiskNCEP Guidelines: Third Report May 2001>59 = negative risk factor for CHD<40 = major risk factor for CHDLDL Calculated                                Date: 07/11/2022Value: 70          Ref range: 0 - 129 mg/dL      Status: Final              Comment: ATP III LDL Classification is Optimal.Sodium                                        Date: 07/11/2022Value: 140         Ref range: 135 - 144 mEq/L    Status: FinalPotassium reflex Magnesium                    Date: 07/11/2022Value: 3.4         Ref range: 3.4 - 4.9 mEq/L    Status: FinalChloride                                      Date: 07/11/2022Value: 103         Ref range: 95 - 107 mEq/L     Status: FinalCO2                                           Date: 07/11/2022Value: 24          Ref range: 20 - 31 mEq/L      Status: FinalAnion Gap                                     Date: 07/11/2022Value: 13          Ref range: 9 - 15 mEq/L       Status: FinalGlucose                                       Date: 07/11/2022Value: 109*        Ref range: 70 - 99 mg/dL      Status: FinalBUN                                           Date: 07/11/2022Value: 29*         Ref range: 8 - 23 mg/dL       Status: FinalCREATININE                                    Date: 07/11/2022Value: 0.62        Ref range: 0.50 - 0.90 mg/dL  Status: FinalGFR Non-                      Date: 07/11/2022Value: >60.0       Ref range: >60                Status: Final              Comment: >60 mL/min/1.73m2 EGFR, calc. for ages 25 and older using theMDRD formula (not corrected for weight), is valid for stablerenal function. GFR                           Date: 07/11/2022Value: >60.0       Ref range: >60                Status: Final              Comment: >60 mL/min/1.73m2 EGFR, calc. for ages 25 and older using theMDRD formula (not corrected for weight), is valid for stablerenal function. Calcium Date: 07/11/2022Value: 8.8         Ref range: 8.5 - 9.9 mg/dL    Status: FinalMagnesium                                     Date: 07/11/2022Value: 1.6*        Ref range: 1.7 - 2.4 mg/dL    Status: FinalPOC Creatinine                                Date: 07/10/2022Value: 1.4*        Ref range: 0.6 - 1.2 mg/dL    Status: FinalGFR Non-                      Date: 07/10/2022Value: 36*         Ref range: >60                Status: Final              Comment: >60 mL/min/1.73m2 EGFR, calc. for ages 25 and older using theMDRD formula (not corrected for weight), is valid for stablerenal function. GFR                           Date: 07/10/2022Value: 37*         Ref range: >60                Status: Final              Comment: >60 mL/min/1.73m2 EGFR, calc. for ages 25 and older using theMDRD formula (not corrected for weight), is valid for stablerenal function. Sample Type                                   Date: 07/10/2022Value: LOUISE           Status: FinalPerformed on                                  Date: 07/10/2022Value: SEE BELOW     Status: Final              Comment: Performed on POC------------    Radiology last 7 days:  CTA Head W WO ContrastResult Date: 7/10/2022FINAL IMPRESSION: NO FLOW-LIMITING STENOSIS, DISSECTION OR LARGE VESSEL INTRACRANIAL ARTERIAL OCCLUSION IDENTIFIED. CT Head WO ContrastResult Date: 7/10/2022NO ACUTE INTRACRANIAL PROCESSOR SIGNIFICANT CHANGE FROM 5/6/2020 IDENTIFIED. CTA Neck W WO ContrastResult Date: 7/10/2022FINAL IMPRESSION: NO FLOW-LIMITING STENOSIS, DISSECTION OR LARGE VESSEL INTRACRANIAL ARTERIAL OCCLUSION IDENTIFIED. MRA HEAD WO CONTRASTResult Date: 7/11/20221. MRI SCAN OF THE BRAIN DEMONSTRATES A CONSIDERABLE AMOUNT OF ISCHEMIC WHITE MATTER CHANGE. NO EVIDENCE OF AN ACUTE INFARCT. 2. MRA SCANS DEMONSTRATE INTRACRANIAL VESSELS TO BE NORMAL.  XR CHEST PORTABLEResult Date: 7/10/2022NO EVIDENCE OF ACTIVE CARDIOPULMONARY DISEASE, BY PORTABLE CHEST RADIOGRAPHY. MRI BRAIN WO CONTRASTResult Date: 7/11/20221. MRI SCAN OF THE BRAIN DEMONSTRATES A CONSIDERABLE AMOUNT OF ISCHEMIC WHITE MATTER CHANGE. NO EVIDENCE OF AN ACUTE INFARCT. 2. MRA SCANS DEMONSTRATE INTRACRANIAL VESSELS TO BE NORMAL. US CAROTID ARTERY BILATERALResult Date: 7/11/2022Impression: 1. Less than 50% stenosis on the right   2. Less than 50% stenosis seen on the left Validated velocity measurements with angiographic measurements, velocity criteria are extrapolated from diameter data as defined by the Society of radiologist in ultrasound consensus conference radiology 2003; 229; 340-346. Updated recommendations for carotid stenosis interpretation criteria-October 2021 by Patrica Mendieta 157. [unfilled]    Discharge Medications    Current Discharge Medication List    Current Discharge Medication ListCONTINUE these medications which have CHANGEDvalsartan (DIOVAN) 320 MG tabletTake 1 tablet by mouth dailyQty: 30 tablet Refills: 0    Current Discharge Medication ListCONTINUE these medications which have NOT CHANGEDcarvedilol (COREG) 6.25 MG tabletTake 1 tablet by mouth 2 times dailyQty: 60 tablet Refills: 11diazePAM (VALIUM) 5 MG tabletTake 2.5 mg by mouth 2 times daily.  ondansetron (ZOFRAN) 4 MG tabletTake 1 tablet by mouth every 12 hours as needed for NauseaQty: 10 tablet Refills: 0famotidine (PEPCID) 20 MG tabletTake by mouthsertraline (ZOLOFT) 50 MG tabletTake 1 tablet by mouth dailyQty: 30 tablet Refills: 0Comments: SEE PRIMARY CARE FOR FURTHER REFILLSacetaminophen (TYLENOL) 500 MG tabletTake 1,000 mg by mouth 2 times dailyatorvastatin (LIPITOR) 20 MG tabletTake 20 mg by mouth nightlyProbiotic Product (PROBIOTIC DAILY PO)Take 1 capsule by mouthaspirin (ECOTRIN) 325 MG EC tabletTake 325 mg by mouth daily    Current Discharge Medication ListSTOP taking these medicationsmetoclopramide (REGLAN) 10 MG tabletComments:Reason for Stopping:amLODIPine (NORVASC) 5 MG tabletComments:Reason for Stopping:atenolol (TENORMIN) 25 MG tabletComments:Reason for Stopping:vitamin B-12 (CYANOCOBALAMIN) 1000 MCG tabletComments:Reason for Stopping:    Time Spent on Discharge:E] minutes were spent in patient examination, evaluation, counseling as well as medication reconciliation, prescriptions for required medications, discharge plan, and follow up.     Electronically signed by Fredy Clark MD on 7/12/22 at 1:01 PM EDT   overtime on dc summary was 45 min

## 2022-07-12 NOTE — PLAN OF CARE
Problem: Safety - Adult  Goal: Free from fall injury  Outcome: Progressing     Problem: ABCDS Injury Assessment  Goal: Absence of physical injury  Outcome: Progressing     Problem: Confusion  Goal: Confusion, delirium, dementia, or psychosis is improved or at baseline  Description: INTERVENTIONS:  1. Assess for possible contributors to thought disturbance, including medications, impaired vision or hearing, underlying metabolic abnormalities, dehydration, psychiatric diagnoses, and notify attending LIP  2. Manor high risk fall precautions, as indicated  3. Provide frequent short contacts to provide reality reorientation, refocusing and direction  4. Decrease environmental stimuli, including noise as appropriate  5. Monitor and intervene to maintain adequate nutrition, hydration, elimination, sleep and activity  6. If unable to ensure safety without constant attention obtain sitter and review sitter guidelines with assigned personnel  7. Initiate Psychosocial CNS and Spiritual Care consult, as indicated  Outcome: Progressing     Problem: Discharge Planning  Goal: Discharge to home or other facility with appropriate resources  Outcome: Progressing     Problem: Skin/Tissue Integrity  Goal: Absence of new skin breakdown  Description: 1. Monitor for areas of redness and/or skin breakdown  2. Assess vascular access sites hourly  3. Every 4-6 hours minimum:  Change oxygen saturation probe site  4. Every 4-6 hours:  If on nasal continuous positive airway pressure, respiratory therapy assess nares and determine need for appliance change or resting period.   Outcome: Progressing     Problem: Pain  Goal: Verbalizes/displays adequate comfort level or baseline comfort level  Outcome: Progressing

## 2022-07-12 NOTE — FLOWSHEET NOTE
07/12/22 0730   Vital Signs   Temp 98.1 °F (36.7 °C)   Heart Rate 76   BP (!) 217/74   MAP (Calculated) 121.67     MD Batsheva Chase notified of patients current blood pressure, gave standing morning medications early, MD Batsheva Chase has requested blood pressure be reassessed in 10 min. New orders received. 3170 Reassessed patient blood pressure which is currently 201/73, MD Batsheva Chase did order new medication clonidine to be given which at this time is unavailable on the unit pharmacy has been notified to send medication to the unit. 0849 Clonidine received from pharmacy and given to patient will reassess patient blood pressure in 1 hour. 0930 MD Marcel Centeno reassessed patient blood pressure which is currently at 147/59 MD Marcel Centeno added new orders to give valsartan.

## 2022-07-12 NOTE — CARE COORDINATION
[facility] EMERGENCY [facility] Case Management Initial Discharge Assessment    Met with Patient (using translation device) and Family (spoke to dtr in [place] on phone [place]) to discuss discharge plan. PCP: Purvi Barron DO                                Date of Last Visit: unsure, but he's been my doctor for 30 years    [facility] Patient: No        [facility] Notified: no    If no PCP, list provided? N/A    Discharge Planning    Living Arrangements: at home dependent on family care    Who do you live with? W/  and dtr (juany). Who helps you with your care:  self or family    If lives at home:     Do you have any barriers navigating in your home? no    Patient can perform ADL? Yes--with assist at home by family    Current Services (outpatient and in home) :  None    Dialysis: No    Is transportation available to get to your appointments? Yes--my dtr/spouse drive    DME Equipment:  yes - has cane, denies any other DME needs    Respiratory equipment: None    Respiratory provider:  no     Pharmacy:  yes - [facility] in [place] 30 years    Consult with Medication Assistance Program?  No      Patient agreeable to [facility]? Yes, [employer]--per patient and dtr want to discuss with spouse/father before agreeing to [facility] needs. Patient agreeable to SNF/Rehab? No and Declined    Other discharge needs identified? N/A    Does Patient Have a High-Risk for Readmission Diagnosis (CHF, PN, MI, COPD)? No    Initial Discharge Plan? (Note: please see concurrent daily documentation for any updates after initial note). D/c plan: Return home w/ spouse and dtr. May be agreeable to [facility] needs at d/c. Denies any other needs at this time.    Per dtr she is going to speak with the patients spouse/her dad about [facility] needs and plans to update nursing/CM if needed at d/c.     Readmission Risk              Risk of Unplanned Readmission:  12         Electronically signed by Ajith Braden RN on 7/12/2022 at 11:05 AM    Patient family reached out the nurse caring for the patient and they would like home healthcare services at d/c. [facility] offered and they are agreeable to [facility] for services. Call placed to notify [facility] of referral and need for services, order on chart. [facility] to call Jada Castanon [phone] to schedule visits.

## 2022-07-13 VITALS
HEART RATE: 67 BPM | RESPIRATION RATE: 18 BRPM | TEMPERATURE: 98.1 F | DIASTOLIC BLOOD PRESSURE: 50 MMHG | WEIGHT: 159 LBS | BODY MASS INDEX: 29.26 KG/M2 | HEIGHT: 62 IN | SYSTOLIC BLOOD PRESSURE: 116 MMHG | OXYGEN SATURATION: 95 %

## 2022-07-13 PROCEDURE — 6370000000 HC RX 637 (ALT 250 FOR IP): Performed by: INTERNAL MEDICINE

## 2022-07-13 PROCEDURE — 6370000000 HC RX 637 (ALT 250 FOR IP): Performed by: FAMILY MEDICINE

## 2022-07-13 PROCEDURE — 95816 EEG AWAKE AND DROWSY: CPT | Performed by: PSYCHIATRY & NEUROLOGY

## 2022-07-13 PROCEDURE — 2500000003 HC RX 250 WO HCPCS: Performed by: INTERNAL MEDICINE

## 2022-07-13 PROCEDURE — 6360000002 HC RX W HCPCS: Performed by: INTERNAL MEDICINE

## 2022-07-13 PROCEDURE — 2580000003 HC RX 258: Performed by: INTERNAL MEDICINE

## 2022-07-13 PROCEDURE — 99233 SBSQ HOSP IP/OBS HIGH 50: CPT | Performed by: PSYCHIATRY & NEUROLOGY

## 2022-07-13 PROCEDURE — APPSS15 APP SPLIT SHARED TIME 0-15 MINUTES: Performed by: NURSE PRACTITIONER

## 2022-07-13 RX ORDER — CARVEDILOL 12.5 MG/1
12.5 TABLET ORAL 2 TIMES DAILY
Status: DISCONTINUED | OUTPATIENT
Start: 2022-07-13 | End: 2022-07-13 | Stop reason: HOSPADM

## 2022-07-13 RX ORDER — AMLODIPINE BESYLATE 5 MG/1
5 TABLET ORAL DAILY PRN
Qty: 30 TABLET | Refills: 0 | Status: SHIPPED | OUTPATIENT
Start: 2022-07-13

## 2022-07-13 RX ORDER — CARVEDILOL 6.25 MG/1
12.5 TABLET ORAL 2 TIMES DAILY
Qty: 60 TABLET | Refills: 11 | Status: SHIPPED | OUTPATIENT
Start: 2022-07-13

## 2022-07-13 RX ORDER — LABETALOL HYDROCHLORIDE 5 MG/ML
10 INJECTION, SOLUTION INTRAVENOUS ONCE
Status: COMPLETED | OUTPATIENT
Start: 2022-07-13 | End: 2022-07-13

## 2022-07-13 RX ORDER — VALSARTAN 160 MG/1
320 TABLET ORAL NIGHTLY
Status: DISCONTINUED | OUTPATIENT
Start: 2022-07-13 | End: 2022-07-13 | Stop reason: HOSPADM

## 2022-07-13 RX ORDER — VALSARTAN 320 MG/1
320 TABLET ORAL NIGHTLY
Qty: 30 TABLET | Refills: 3 | Status: SHIPPED | OUTPATIENT
Start: 2022-07-13

## 2022-07-13 RX ADMIN — CLONIDINE HYDROCHLORIDE 0.1 MG: 0.1 TABLET ORAL at 07:19

## 2022-07-13 RX ADMIN — LABETALOL HYDROCHLORIDE 10 MG: 5 INJECTION, SOLUTION INTRAVENOUS at 04:24

## 2022-07-13 RX ADMIN — Medication 10 ML: at 10:35

## 2022-07-13 RX ADMIN — CLONIDINE HYDROCHLORIDE 0.1 MG: 0.1 TABLET ORAL at 01:10

## 2022-07-13 RX ADMIN — SERTRALINE HYDROCHLORIDE 50 MG: 50 TABLET ORAL at 09:25

## 2022-07-13 RX ADMIN — FAMOTIDINE 20 MG: 20 TABLET, FILM COATED ORAL at 09:25

## 2022-07-13 RX ADMIN — ASPIRIN 325 MG: 325 TABLET ORAL at 09:25

## 2022-07-13 RX ADMIN — ENOXAPARIN SODIUM 40 MG: 100 INJECTION SUBCUTANEOUS at 10:35

## 2022-07-13 ASSESSMENT — ENCOUNTER SYMPTOMS
COUGH: 0
WHEEZING: 0
COLOR CHANGE: 0
VOMITING: 0
SHORTNESS OF BREATH: 0
DIARRHEA: 0
NAUSEA: 0
CHEST TIGHTNESS: 0
TROUBLE SWALLOWING: 0

## 2022-07-13 ASSESSMENT — PAIN SCALES - GENERAL: PAINLEVEL_OUTOF10: 0

## 2022-07-13 NOTE — PROGRESS NOTES
Progress Note  Date:2022       [id number]  Patient Scott Becerra     YOB: 1935     Age:87 y.o. Subjective    Subjective:  Symptoms:  She reports malaise and weakness. No shortness of breath, cough, chest pain, headache, chest pressure, anorexia, diarrhea or anxiety. Diet:  No nausea or vomiting. Review of Systems   Respiratory: Negative for cough and shortness of breath. Cardiovascular: Negative for chest pain. Gastrointestinal: Negative for anorexia, diarrhea, nausea and vomiting. Neurological: Positive for weakness. Objective         Vitals Last 24 Hours:  TEMPERATURE:  Temp  Av.4 °F (36.9 °C)  Min: 98.1 °F (36.7 °C)  Max: 98.6 °F (37 °C)  RESPIRATIONS RANGE: Resp  Av  Min: 18  Max: 18  PULSE OXIMETRY RANGE: SpO2  Av.2 %  Min: 95 %  Max: 99 %  PULSE RANGE: Pulse  Av.8  Min: 66  Max: 76  BLOOD PRESSURE RANGE: Systolic (99DCV), EAP:282 , Min:94 , INX:034   ; Diastolic (64KIX), KA, Min:34, Max:86    I/O (24Hr): No intake or output data in the 24 hours ending 22 1129  Objective:  General Appearance:  Comfortable, well-appearing and in no acute distress. Vital signs: (most recent): Blood pressure (!) 116/50, pulse 67, temperature 98.1 °F (36.7 °C), temperature source Oral, resp. rate 18, height 5' 2\" (1.575 m), weight 159 lb (72.1 kg), SpO2 95 %. HEENT: Normal HEENT exam.    Lungs:  Normal effort. Heart: Normal rate. S1 normal and S2 normal.    Abdomen: Abdomen is soft. Bowel sounds are normal.   There is no epigastric area tenderness. Pulses: Distal pulses are intact. Neurological: Patient is alert and oriented to person, place and time. Pupils:  Pupils are equal, round, and reactive to light. Skin:  Warm.       Labs/Imaging/Diagnostics    Labs:  CBC:  Recent Labs     07/10/22  1600 22  0601   WBC 9.8 10.5   RBC 4.40 4.07*   HGB 13.0 12.2   HCT 39.4 36.8*   MCV 89.5 90.6   RDW 14.5 14.5    cervical spine appears similar to 4/29/2019. FINAL IMPRESSION: NO FLOW-LIMITING STENOSIS, DISSECTION OR LARGE VESSEL INTRACRANIAL ARTERIAL OCCLUSION IDENTIFIED. CT Head WO Contrast    Result Date: 7/10/2022  CT HEAD WO CONTRAST : 7/10/2022 CLINICAL HISTORY:  slurred speech . COMPARISON: 5/6/2020. TECHNIQUE: Spiral unenhanced images were obtained of the head, with routine multiplanar reconstructions performed. All CT scans at this facility use dose modulation, iterative reconstruction, and/or weight based dosing when appropriate to reduce radiation dose to as low as reasonably achievable. FINDINGS: There is no intracranial hemorrhage, mass effect, midline shift, extra-axial collection, evidence of hydrocephalus, recent ischemic infarct, or skull fracture identified. Mild generalized cerebral volume loss and moderate patchy white matter changes are again noted. The mastoid air cells and visualized paranasal sinuses are essentially clear. NO ACUTE INTRACRANIAL PROCESSOR SIGNIFICANT CHANGE FROM 5/6/2020 IDENTIFIED. CTA Neck W WO Contrast    Result Date: 7/10/2022  CTA HEAD W WO CONTRAST, CTA NECK W WO CONTRAST : 7/10/2022 CLINICAL HISTORY:  slurred speech . COMPARISON: Head CT from earlier 7/10/2022 and cervical spine CT 4/29/2019. TECHNIQUE: Spiral images were obtained of the head and neck after the uneventful intravenous administration of approximately 100 mL of Isovue-300 contrast with CTA protocol. Internal carotid narrowings are estimated using NASCET criteria. Routine and volume rendered images were obtained on a 3-dimensional workstation. All CT scans at this facility use dose modulation, iterative reconstruction, and/or weight based dosing when appropriate to reduce radiation dose to as low as reasonably achievable.  HEAD CTA FINDINGS: There is no large vessel intracranial arterial occlusion, flow-limiting stenosis, intracranial aneurysm, evidence of vascular malformation, dural venous sinus thrombosis, or developmental variations of concern identified. Mild calcific plaquing is present of the cavernous carotids and the V4 segment of a developmentally dominant left vertebral artery. NECK CTA FINDINGS: There is no flow-limiting stenosis, dissection, or acute findings identified in the neck. Mild atherosclerotic plaquing is noted in the aortic arch, great vessel origins and right carotid bulb. The left vertebral artery is developmentally dominant. Small thyroid nodules measuring up to just over 1 cm and mild degenerative changes of the cervical spine appears similar to 4/29/2019. FINAL IMPRESSION: NO FLOW-LIMITING STENOSIS, DISSECTION OR LARGE VESSEL INTRACRANIAL ARTERIAL OCCLUSION IDENTIFIED. MRA HEAD WO CONTRAST    Result Date: 7/11/2022  EXAMINATION:  MRI BRAIN. MRA INTRACRANIAL VESSELS. CLINICAL HISTORY:  CVA. COMPARISONS:  NONE AVAILABLE TECHNIQUE:  T1, T2 and diffusion scans. MIP Images. No contrast. FINDINGS:  Scans through the brain demonstrates a considerable amount of ischemic white matter change. There is no evidence of a mass or infarct. There is no hemorrhage or hematoma. There is no midline shift or evidence of increased intracranial pressure. There is mild generalized atrophy. MRA scans demonstrates the internal carotid arteries and the basilar artery to be patent and normal in caliber. The superior cerebral arteries are patent. Middle cerebral and anterior cerebral arteries are patent. No abnormal vascularity is demonstrated. No definite evidence of an aneurysm. 1. MRI SCAN OF THE BRAIN DEMONSTRATES A CONSIDERABLE AMOUNT OF ISCHEMIC WHITE MATTER CHANGE. NO EVIDENCE OF AN ACUTE INFARCT. 2. MRA SCANS DEMONSTRATE INTRACRANIAL VESSELS TO BE NORMAL. XR CHEST PORTABLE    Result Date: 7/10/2022  XR CHEST PORTABLE : 7/10/2022 CLINICAL HISTORY:  confusion, slurred speech . COMPARISON: 6/10/2021 TECHNIQUE: A portable upright AP radiograph of the chest was obtained.  FINDINGS: Shallow inspiratory volumes are present without significant infiltrate identified. There is no cardiomegaly, significant pleural effusion, vascular congestion, pneumothorax, or displaced fractures identified. 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 NO EVIDENCE OF ACTIVE CARDIOPULMONARY DISEASE, BY PORTABLE CHEST RADIOGRAPHY. MRI BRAIN WO CONTRAST    Result Date: 7/11/2022  EXAMINATION:  MRI BRAIN. MRA INTRACRANIAL VESSELS. CLINICAL HISTORY:  CVA. COMPARISONS:  NONE AVAILABLE TECHNIQUE:  T1, T2 and diffusion scans. MIP Images. No contrast. FINDINGS:  Scans through the brain demonstrates a considerable amount of ischemic white matter change. There is no evidence of a mass or infarct. There is no hemorrhage or hematoma. There is no midline shift or evidence of increased intracranial pressure. There is mild generalized atrophy. MRA scans demonstrates the internal carotid arteries and the basilar artery to be patent and normal in caliber. The superior cerebral arteries are patent. Middle cerebral and anterior cerebral arteries are patent. No abnormal vascularity is demonstrated. No definite evidence of an aneurysm. 1. MRI SCAN OF THE BRAIN DEMONSTRATES A CONSIDERABLE AMOUNT OF ISCHEMIC WHITE MATTER CHANGE. NO EVIDENCE OF AN ACUTE INFARCT. 2. MRA SCANS DEMONSTRATE INTRACRANIAL VESSELS TO BE NORMAL. US CAROTID ARTERY BILATERAL    Result Date: 7/11/2022  History:  confusion Technique:  US CAROTID ARTERY BILATERAL Comparison: July 27, 2015 Findings: Intimal thickening and calcified plaque are seen in the distal common carotid artery and in the right internal carotid artery. Intimal thickening is seen in the normal mild echogenic plaque is seen in the left internal carotid artery. No increased velocity is seen. Normal antegrade flow is visualized in the vertebral arteries.  Doppler findings: ARTERIAL BLOOD FLOW VELOCITY RIGHT PS                                               Prox CCA 143cm/s Mid CCA 144cm/s              Dist CCA  130 cm/s              Prox ICA 131cm/s              Mid ICA 119cm/s              Dist ICA 100cm/s              Prox ECA 164cm/s              Dist VERT 56cm/s              Bulb ICA/CCA 0.9 LEFT PS Prox CCA 162cm/s              Mid CCA 169cm/s              Dist CCA 153cm/s              Prox ICA 152cm/s              Mid ICA 155cm/s              Dist ICA 90cm/s              Prox  cm/s              Prox VERT 78cm/s              Bulb ICA/CCA 0.9     Impression: 1. Less than 50% stenosis on the right   2. Less than 50% stenosis seen on the left Validated velocity measurements with angiographic measurements, velocity criteria are extrapolated from diameter data as defined by the Society of radiologist in ultrasound consensus conference radiology 2003; 229; 340-346. Updated recommendations for carotid stenosis interpretation criteria-October 2021 by Patrica Mendieta 157. 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 7/12: Patient's blood pressure was elevated yesterday night and today morning. Patient received clonidine improvement of blood pressure. We will add Diovan at night and increase her carvedilol when she is at home. Spoke with the daughter over the phone.       Electronically signed by Ayanna Rousseau MD on 7/11/22 at 2:01 PM EDT

## 2022-07-13 NOTE — PROGRESS NOTES
Physician Progress Note      PATIENT:               Andi Che  CSN #:                  [id number]  :                       1935  ADMIT DATE:       7/10/2022 3:51 PM  [address] [place] DATE:        2022 1:50 PM  RESPONDING  PROVIDER #:        Madelyn Reza MD          QUERY TEXT:    Pt admitted with aphasia, altered mental status, DALILA and dehydration. \"Stroke   like symptoms\" noted in d/c summary. MRI negative for acute infarct. Pt was   given IVF. If possible, please document in progress notes and discharge   summary if you are evaluating and /or treating any of the following: The medical record reflects the following:  Risk Factors: dehydration, HTN, CAD  Clinical Indicators: BUN 47; Scr 1.37; MRI of the brain shows considerable   ischemic white matter changes but no acute infarct. MRA of the head negative. Carotid duplex with less than 50% stenosis bilaterally. Treatment: NS 1L then at 75 ml/hr, imaging, neurology consult, pending EEG    Thank you, Zayda Nicole RN BSN [facility]  [phone]  Options provided:  -- Aphasia and altered mental status due to Dehydration  -- Aphasia and altered mental status associated with TIA  -- Aphasia and altered mental status associated with dehydration and TIA  -- Other - I will add my own diagnosis  -- Disagree - Not applicable / Not valid  -- Disagree - Clinically unable to determine / Unknown  -- Refer to Clinical Documentation Reviewer    PROVIDER RESPONSE TEXT:    Provider disagreed with this query.   please refer to neurology note    Query created by: Yony Hardy on 2022 10:05 AM      Electronically signed by:  Madelyn Reza MD 2022 2:13 PM

## 2022-07-13 NOTE — PROGRESS NOTES
[facility] Neurology Daily Progress Note  Name: Sheryl Gilmore  Age: 82 y.o. Gender: female  CodeStatus: Full Code  Allergies: Carbapenems  Cephalosporins  Hydralazine  Pcn [Penicillins]    Chief Complaint:No chief complaint on file. Primary Care Provider: Andreea Michelle DO  InpatientTreatment Team: Treatment Team: Attending Provider: Ruddy Preston MD; Consulting Physician: Medardo Gunderson MD; Utilization Reviewer: Marlene Foster RN; Registered Nurse: Etter Lesches, RN; Respiratory Therapist (Day): Eddie Parker RCP; Patient Care Tech: Tammie Cuff; Registered Nurse: Vandana Cutler RN; Patient Care Tech: Gurpreet Gustafson  Admission Date: 7/10/2022      HPI   Pt seen and examined for neuro follow up for aphasia and change in mental status. Speaking fluent English today. She is currently alert and oriented x3, no acute distress, cooperative. Speaking without any issues today. Patient has completely resolved. No other focal neurodeficits. Denies headache or vision changes. No shortness of breath or chest pain. Remains hypertensive at times. Patient actually speaking normally today. She appeared to be somewhat tachycardic and I did discuss that a lot of her high blood pressure is secondary to stress and anxiety and she is actually quite agreeable and aware of this. Vitals:    07/13/22 1041   BP: (!) 116/50   Pulse: 67   Resp: 18   Temp:    SpO2:       Review of Systems   Constitutional: Negative for appetite change and fever. HENT: Negative for hearing loss and trouble swallowing. Eyes: Negative for visual disturbance. Respiratory: Negative for cough, chest tightness, shortness of breath and wheezing. Cardiovascular: Negative for chest pain, palpitations and leg swelling. Gastrointestinal: Negative for nausea and vomiting. Skin: Negative for color change and rash.    Neurological: Negative for dizziness, tremors, seizures, syncope, facial asymmetry, speech difficulty, weakness, light-headedness, numbness and headaches. Psychiatric/Behavioral: Negative for agitation, confusion and hallucinations. The patient is not nervous/anxious. Physical Exam  Vitals and nursing note reviewed. Constitutional:       General: She is not in acute distress. Appearance: She is not diaphoretic. HENT:      Head: Normocephalic. Eyes:      General: No visual field deficit. Pupils: Pupils are equal, round, and reactive to light. Cardiovascular:      Rate and Rhythm: Normal rate and regular rhythm. Pulmonary:      Effort: Pulmonary effort is normal. No respiratory distress. Breath sounds: Normal breath sounds. Abdominal:      General: Bowel sounds are normal. There is no distension. Palpations: Abdomen is soft. Tenderness: There is no abdominal tenderness. Skin:     General: Skin is warm and dry. Neurological:      General: No focal deficit present. Mental Status: She is alert and oriented to person, place, and time. Mental status is at baseline. Cranial Nerves: No dysarthria or facial asymmetry. Motor: No weakness, tremor or seizure activity. Focal examination except for some word finding difficulty. Patient has a normal examination today.   Much improved from her initial evaluation        Medications:  Reviewed    Infusion Medications:    sodium chloride       Scheduled Medications:    carvedilol  12.5 mg Oral BID    valsartan  320 mg Oral Nightly    enoxaparin  40 mg SubCUTAneous Daily    atorvastatin  20 mg Oral Nightly    famotidine  20 mg Oral Daily    sertraline  50 mg Oral Daily    sodium chloride flush  5-40 mL IntraVENous 2 times per day    aspirin  325 mg Oral Daily     PRN Meds: cloNIDine, diazePAM, ondansetron, sodium chloride flush, sodium chloride, [DISCONTINUED] ondansetron **OR** ondansetron, acetaminophen **OR** acetaminophen, polyethylene glycol, potassium chloride **OR** potassium alternative oral replacement **OR** potassium chloride, nitroGLYCERIN    Labs:   Recent Labs     07/10/22  1600 07/11/22  0601   WBC 9.8 10.5   HGB 13.0 12.2   HCT 39.4 36.8*    245     Recent Labs     07/10/22  1600 07/10/22  1611 07/11/22  0601     --  140   K 3.4  --  3.4   CL 98  --  103   CO2 26  --  24   BUN 47*  --  29*   CREATININE 1.37* 1.4* 0.62   CALCIUM 9.3  --  8.8     Recent Labs     07/10/22  1600   AST 13   ALT 8   BILITOT 0.4   ALKPHOS 87     Recent Labs     07/10/22  1600   INR 1.1     Recent Labs     07/10/22  1600   TROPONINI <0.010       Urinalysis:   Lab Results   Component Value Date/Time    NITRU Negative 07/10/2022 04:00 PM    WBCUA  10/15/2021 01:07 PM    BACTERIA FEW 10/15/2021 01:07 PM    RBCUA None seen 06/10/2021 03:45 PM    BLOODU Negative 07/10/2022 04:00 PM    SPECGRAV 1.045 07/10/2022 04:00 PM    GLUCOSEU Negative 07/10/2022 04:00 PM       Radiology:   Most recent    EEG No valid procedures specified. MRI of Brain No results found for this or any previous visit. Results for orders placed during the hospital encounter of 07/10/22    MRI BRAIN WO CONTRAST    Narrative  EXAMINATION:  MRI BRAIN. MRA INTRACRANIAL VESSELS. CLINICAL HISTORY:  CVA. COMPARISONS:  NONE AVAILABLE    TECHNIQUE:  T1, T2 and diffusion scans. MIP Images. No contrast.    FINDINGS:  Scans through the brain demonstrates a considerable amount of ischemic white matter change. There is no evidence of a mass or infarct. There is no hemorrhage or hematoma. There is no midline shift or evidence of increased intracranial  pressure. There is mild generalized atrophy. MRA scans demonstrates the internal carotid arteries and the basilar artery to be patent and normal in caliber. The superior cerebral arteries are patent. Middle cerebral and anterior cerebral arteries are patent. No abnormal vascularity is demonstrated. No definite evidence of an aneurysm. Impression  1.  MRI SCAN OF THE BRAIN DEMONSTRATES A CONSIDERABLE AMOUNT OF ISCHEMIC WHITE MATTER CHANGE. NO EVIDENCE OF AN ACUTE INFARCT. 2. MRA SCANS DEMONSTRATE INTRACRANIAL VESSELS TO BE NORMAL. MRA of the Head and Neck: No results found for this or any previous visit. No results found for this or any previous visit. Results for orders placed during the hospital encounter of 07/10/22    MRA HEAD WO CONTRAST    Narrative  EXAMINATION:  MRI BRAIN. MRA INTRACRANIAL VESSELS. CLINICAL HISTORY:  CVA. COMPARISONS:  NONE AVAILABLE    TECHNIQUE:  T1, T2 and diffusion scans. MIP Images. No contrast.    FINDINGS:  Scans through the brain demonstrates a considerable amount of ischemic white matter change. There is no evidence of a mass or infarct. There is no hemorrhage or hematoma. There is no midline shift or evidence of increased intracranial  pressure. There is mild generalized atrophy. MRA scans demonstrates the internal carotid arteries and the basilar artery to be patent and normal in caliber. The superior cerebral arteries are patent. Middle cerebral and anterior cerebral arteries are patent. No abnormal vascularity is demonstrated. No definite evidence of an aneurysm. Impression  1. MRI SCAN OF THE BRAIN DEMONSTRATES A CONSIDERABLE AMOUNT OF ISCHEMIC WHITE MATTER CHANGE. NO EVIDENCE OF AN ACUTE INFARCT. 2. MRA SCANS DEMONSTRATE INTRACRANIAL VESSELS TO BE NORMAL. CT of the Head: Results for orders placed during the hospital encounter of 07/10/22    CT Head WO Contrast    Narrative  CT HEAD WO CONTRAST : 7/10/2022    CLINICAL HISTORY:  slurred speech . COMPARISON: 5/6/2020. TECHNIQUE: Spiral unenhanced images were obtained of the head, with routine multiplanar reconstructions performed. All CT scans at this facility use dose modulation, iterative reconstruction, and/or weight based dosing when appropriate to reduce radiation dose to as low as reasonably achievable.       FINDINGS:    There is no intracranial hemorrhage, mass effect, midline shift, extra-axial collection, evidence of hydrocephalus, recent ischemic infarct, or skull fracture identified. Mild generalized cerebral volume loss and moderate patchy white matter changes are again noted. The mastoid air cells and visualized paranasal sinuses are essentially clear. Impression  NO ACUTE INTRACRANIAL PROCESSOR SIGNIFICANT CHANGE FROM 5/6/2020 IDENTIFIED. No results found for this or any previous visit. No results found for this or any previous visit. Carotid duplex: No results found for this or any previous visit. No results found for this or any previous visit. Results for orders placed during the hospital encounter of 07/10/22    US CAROTID ARTERY BILATERAL    Narrative  History:  confusion    Technique:  US CAROTID ARTERY BILATERAL    Comparison: July 27, 2015    Findings:  Intimal thickening and calcified plaque are seen in the distal common carotid artery and in the right internal carotid artery. Intimal thickening is seen in the normal mild echogenic plaque is seen in the left internal carotid artery. No increased  velocity is seen. Normal antegrade flow is visualized in the vertebral arteries. Doppler findings:  ARTERIAL BLOOD FLOW VELOCITY    RIGHT PS    Prox CCA 143cm/s  Mid CCA 144cm/s  Dist CCA  130 cm/s  Prox ICA 131cm/s  Mid ICA 119cm/s  Dist ICA 100cm/s  Prox ECA 164cm/s  Dist VERT 56cm/s  Bulb  ICA/CCA 0.9    LEFT PS    Prox CCA 162cm/s  Mid CCA 169cm/s  Dist CCA 153cm/s  Prox ICA 152cm/s  Mid ICA 155cm/s  Dist ICA 90cm/s  Prox  cm/s  Prox VERT 78cm/s  Bulb  ICA/CCA 0.9    Impression  Impression: 1. Less than 50% stenosis on the right   2. Less than 50% stenosis seen on the left    Validated velocity measurements with angiographic measurements, velocity criteria are extrapolated from diameter data as defined by the Society of radiologist in ultrasound consensus conference radiology 2003; 229; 340-346.     Updated recommendations for carotid stenosis interpretation criteria-October 2021 by Patrica Norris. Echo No results found for this or any previous visit. Assessment/Plan:    7/11/22:  Aphasia with word finding issues as well as following commands. She is able to comprehend though takes her own time. She is not frustrated regarding this. This either is a right parietal lobe stroke or AP or posterior left parietal stroke. If the MRI surprisingly negative then this may be an aftermath of her recent social event that occurred with loss of her brother and may have affected her. We will arrange for an EEG to make sure there is not status epilepticus either. Patient does have some risk factors for some vascular disease and high risk for atrial fibrillation. Was on aspirin but no other notable anticoagulation. She is otherwise very functional.  Next of this consultation includes my consultation with emergency room. 7/12/22:  Aphasia resolved  MRI of the brain shows considerable ischemic white matter changes but no acute infarct. MRA of the head negative. Carotid duplex with less than 50% stenosis bilaterally. DALILA, resolved  EEG pending  Blood pressure elevated. Will need ongoing attention of blood pressure control. I have personally performed a face to face diagnostic evaluation on this patient, reviewed all data and investigations, and am the sole provider of all clinical decisions on the neurological status of this patient. Patient is improved with word finding difficulty and speech is better. She is able to answer in English though it may not be baseline. I await her EEG and once this is available and done patient may be discharged and this might have been a psychiatric presentation of event in patient's life. More than 60% time spent on evaluating this patient. 7/13/2022  EEG normal  Encephalopathy resolved  Neurology to sign off.   Call with questions or concerns    I have personally performed a face to face diagnostic evaluation on this patient, reviewed all data and investigations, and am the sole provider of all clinical decisions on the neurological status of this patient. Patient examined and has a normal examination her speech is completely normal.  EEG was reviewed and is very well-regulated. I did discuss with her that her symptoms are secondary anxiety and therefore high blood pressure. She is actually quite agreeable to this and this may be the answer. More than 60% time spent by me evaluating and treating this patient evaluation of her test results as well. Donald Lee MD, Chandler Alvarez, American Board of Psychiatry & Neurology  Board Certified in Vascular Neurology  Board Certified in Neuromuscular Medicine  Certified in Neurorehabilitation         Collaborating physicians: Dr Anat Lee    Electronically signed by TORSTEN Singh CNP on 7/13/2022 at 11:53 AM

## 2022-07-13 NOTE — DISCHARGE INSTR - COC
Continuity of Care Form    Patient Name: Maico Eastman   :  1935  MRN:  [id number]    Admit date:  7/10/2022  Discharge date:  2022    Code Status Order: Full Code   Advance Directives:      Admitting Physician:  Eileen Fitch MD  PCP: Shelley Holt DO    Discharging Nurse: [facility] Unit/Room#: H597/A225-22  Discharging Unit Phone Number: [phone]    Emergency Contact:   Extended Emergency Contact Information  Primary Emergency Contact: JonesBrandon   [address] Phone: [phone]  Relation: Spouse  Secondary Emergency Contact: Jabier Chino  [place] Phone: [phone]  Relation: Child    Past Surgical History:  Past Surgical History:   Procedure Laterality Date    CORONARY ARTERY BYPASS GRAFT         Immunization History:   Immunization History   Administered Date(s) Administered    COVID-19, MODERNA BLUE border, Primary or Immunocompromised, (age 12y+), IM, 100 mcg/0.5mL 2021, 2021    Influenza Virus Vaccine 2014    Influenza, Quadv, IM, PF (6 mo and older Fluzone, Flulaval, Fluarix, and 3 yrs and older Afluria) 2019    Influenza, Quadv, adjuvanted, 65 yrs +, IM, PF (Fluad) 2020    Influenza, Triv, inactivated, subunit, adjuvanted, IM (Fluad 65 yrs and older) 2019    Pneumococcal Conjugate 13-valent (Yesika Axe) 2015    Zoster Recombinant (Shingrix) 2020       Active Problems:  Patient Active Problem List   Diagnosis Code    Abnormal finding on EKG R94.31    Anxiety disorder, unspecified F41.9    Ataxia, unspecified R27.0    Coronary artery disease involving native coronary artery of native heart without angina pectoris I25.10    Heart palpitations R00.2    TIA (transient ischemic attack) G45.9    Chest pain R07.9    Angina at rest ([address] I20.8    Change in mental state R41.82    Confusion and disorientation R41.0    Hx of CABG Z95.1    Hypertension I10    Aphasia R47.01       Isolation/Infection: Isolation            No Isolation          Patient Infection Status       Infection Onset Added Last Indicated Last Indicated By Review Planned Expiration Resolved Resolved By    None active    Resolved    COVID-19 (Rule Out) 08/22/21 08/22/21 08/22/21 COVID-19, Rapid (Ordered)   08/22/21 Rule-Out Test Resulted            Nurse Assessment:  Last Vital Signs: BP (!) 116/50   Pulse 67   Temp 98.1 °F (36.7 °C) (Oral)   Resp 18   Ht 5' 2\" (1.575 m)   Wt 159 lb (72.1 kg)   SpO2 95%   BMI 29.08 kg/m²     Last documented pain score (0-10 scale): Pain Level: 0  Last Weight:   Wt Readings from Last 1 Encounters:   07/12/22 159 lb (72.1 kg)     Mental Status:  oriented and alert    IV Access:  - None    Nursing Mobility/ADLs:  Walking   Independent  Transfer  Independent  Bathing  Independent  Dressing  Assisted  Toileting  Independent  Feeding  [address]  Assisted  Med Delivery   whole    Wound Care Documentation and Therapy:        Elimination:  Continence: Bowel: Yes  Bladder: Yes  Urinary Catheter: None   Colostomy/Ileostomy/Ileal Conduit: No       Date of Last BM: 7-  No intake or output data in the 24 hours ending 07/13/22 1310  I/O last 3 completed shifts:  In: -   Out: 800 [Urine:800]    Safety Concerns: At Risk for Falls    Impairments/Disabilities:      None    Nutrition Therapy:  Current Nutrition Therapy:   - Oral Diet:  General    Routes of Feeding: Oral  Liquids: Thin Liquids  Daily Fluid Restriction: no  Last Modified Barium Swallow with Video (Video Swallowing Test): not done    Treatments at the Time of Hospital Discharge:   Respiratory Treatments: na  Oxygen Therapy:  is not on home oxygen therapy.   Ventilator:    - No ventilator support    Rehab Therapies: Physical Therapy and Occupational Therapy  Weight Bearing Status/Restrictions: No weight bearing restrictions  Other Medical Equipment (for information only, NOT a DME order):  walker  Other Treatments: na    Patient's personal belongings (please select all that are sent with patient):  Mohsen    RN SIGNATURE:  Electronically signed by Janeth Guthrie RN on 7/13/22 at 1:35 PM EDT    CASE MANAGEMENT/SOCIAL WORK SECTION    Inpatient Status Date: ***    Readmission Risk Assessment Score:  Readmission Risk              Risk of Unplanned Readmission:  12           Discharging to Facility/ Agency   Name:   Address:  Phone:  Fax:    Dialysis Facility (if applicable)   Name:  Address:  Dialysis Schedule:  Phone:  Fax:    / signature: {Esignature:[id number]}    PHYSICIAN SECTION    Prognosis: {Prognosis:4053326671}    Condition at Discharge: [address] [place] [address] Patient Condition:955996011}    Rehab Potential (if transferring to Rehab): {Prognosis:3515941266}    Recommended Labs or Other Treatments After Discharge: ***    Physician Certification: I certify the above information and transfer of Andrew Valle  is necessary for the continuing treatment of the diagnosis listed and that she requires {Admit to Appropriate Level of Care:66392} for {GREATER/LESS:756736277} 30 days.      Update Admission H&P: {CHP DME Changes in YIRSD:670518896}    PHYSICIAN SIGNATURE:  {Esignature:671575689}

## 2022-07-13 NOTE — DISCHARGE INSTR - DIET

## 2022-07-25 NOTE — PROGRESS NOTES
Physician Progress Note      PATIENT:               Javier Rhodes  CSN #:                  [id number]  :                       1935  ADMIT DATE:       7/10/2022 3:51 PM  [address] [place] DATE:        2022 1:50 PM  RESPONDING  PROVIDER #:        Steven Byrd MD          QUERY TEXT:    Pt admitted with aphasia, confusion, DALILA and dehydration. Encephalopathy noted   by neurology on . MRI, CT and EEG were negative for acute process. Response to query  per Dr. Lazarus Moats states ? please refer to neuro note? Chase De La Garza If   possible, please document in progress notes and discharge summary if you are   evaluating and /or treating any of the following: The medical record reflects the following:  Risk Factors: dehydration, HTN, CAD, recent loss of brother  Clinical Indicators: Pt presented with aphasia with word finding difficulty,   AMS/confusion/disorientation, AAO x0 on arrival. Pt was able to simple  follow   commands. GCS 14. BUN 47; Scr 1.37>0.62; MRI of the brain shows considerable   ischemic white matter changes but no acute infarct. MRA of the head negative. Carotid duplex with less than 50% stenosis bilaterally. EEG normal. SBP on   admit 155 after admission 217.  Dr Ara Johnson PN ? I did discuss with her that   her symptoms are secondary anxiety and therefore high blood pressure. ?. Treatment: NS 1L then at 75 ml/hr, CT head and neck, neurology consult,   Zoloft, EEG, ECHO, Carotid US, Lovenox, Coreg, Diovan, Catapres, 1 dose   labetalol.     Thank you, Michael Bright RN BSN CDS  [phone]  Options provided:  -- Confusion associated with encephalopathy and aphasia multifactorial due to   dehydration, DALILA and anxiety  -- Confusion associated with encephalopathy and aphasia due to dehydration  -- Confusion associated with encephalopathy and aphasia due to DALILA  -- Other - I will add my own diagnosis  -- Disagree - Not applicable / Not valid  -- Disagree - Clinically unable to determine / Unknown  -- Refer to

## 2022-07-29 ENCOUNTER — HOSPITAL ENCOUNTER (OUTPATIENT)
Dept: GENERAL RADIOLOGY | Age: 87
Discharge: HOME | End: 2022-07-31
Payer: MEDICARE

## 2022-07-29 ENCOUNTER — HOSPITAL ENCOUNTER (OUTPATIENT)
Dept: LAB | Age: 87
Discharge: HOME | End: 2022-07-29
Payer: MEDICARE

## 2022-07-29 DIAGNOSIS — M25.512 PAIN OF BOTH SHOULDER JOINTS: ICD-10-CM

## 2022-07-29 DIAGNOSIS — M25.511 PAIN OF BOTH SHOULDER JOINTS: ICD-10-CM

## 2022-07-29 LAB
BASOPHILS ABSOLUTE: 0 K/UL (ref 0–0.2)
BASOPHILS RELATIVE PERCENT: 0.4 %
EOSINOPHILS ABSOLUTE: 0 K/UL (ref 0–0.7)
EOSINOPHILS RELATIVE PERCENT: 0.5 %
HCT VFR BLD CALC: 39.4 % (ref 37–47)
HEMOGLOBIN: 13 G/DL (ref 12–16)
LYMPHOCYTES ABSOLUTE: 1.5 K/UL (ref 1–4.8)
LYMPHOCYTES RELATIVE PERCENT: 17 %
MCH RBC QN AUTO: 29.5 PG (ref 27–31.3)
MCHC RBC AUTO-ENTMCNC: 33.1 % (ref 33–37)
MCV RBC AUTO: 89.1 FL (ref 82–100)
MONOCYTES ABSOLUTE: 0.7 K/UL (ref 0.2–0.8)
MONOCYTES RELATIVE PERCENT: 7.7 %
NEUTROPHILS ABSOLUTE: 6.7 K/UL (ref 1.4–6.5)
NEUTROPHILS RELATIVE PERCENT: 74.4 %
PDW BLD-RTO: 14.1 % (ref 11.5–14.5)
PLATELET # BLD: 333 K/UL (ref 130–400)
RBC # BLD: 4.42 M/UL (ref 4.2–5.4)
WBC # BLD: 9 K/UL (ref 4.8–10.8)

## 2022-07-29 PROCEDURE — 85025 COMPLETE CBC W/AUTO DIFF WBC: CPT

## 2022-07-29 PROCEDURE — 73030 X-RAY EXAM OF SHOULDER: CPT

## 2022-10-12 NOTE — ED PROVIDER NOTES
[facility] ED  EMERGENCY DEPARTMENT ENCOUNTER      Pt Name: Tiffani Mancera  MRN: [id number]  [place] 1935  Date of evaluation: 8/25/2021  Provider: Maria Alejandra Ruiz DO    CHIEF COMPLAINT       Chief Complaint   Patient presents with    Hypertension         HISTORY OF PRESENT ILLNESS   (Location/Symptom, Timing/Onset, Context/Setting, Quality, Duration, Modifying Factors, Severity)  Note limiting factors. Tiffani Mancera is a 82 y.o. female who presents to the emergency department . Patient here because blood pressure was high at home. She was feeling fine. Patient has long history of anxiety. She states that last night she took kept taking her blood pressure and it kept going up. Patient took her usual medications for blood pressure which include  Norvasc 5 mg, atenolol 25 mg, Coreg 6.25 mg, valsartan hand 320 mg. Patient has been on Valium for most of her life. She was recently put on an antidepressant and is supposed to wean herself off the Valium. She has been cutting her Valium smaller and smaller in order to get off of it. She admits that she does have depression and anxiety. She feels much more calm when she is at the hospital than when she is at her home. HPI    Nursing Notes were reviewed. REVIEW OF SYSTEMS    (2-9 systems for level 4, 10 or more for level 5)     Review of Systems   Constitutional: Negative for activity change, appetite change and fatigue. HENT: Negative for congestion and sore throat. Eyes: Negative for pain and visual disturbance. Respiratory: Negative for chest tightness and shortness of breath. Cardiovascular: Negative for chest pain. Gastrointestinal: Negative for abdominal pain, nausea and vomiting. Endocrine: Negative for polydipsia. Genitourinary: Negative for flank pain and urgency. Musculoskeletal: Negative for gait problem and neck stiffness. Skin: Negative for rash.    Neurological: Negative for weakness, light-headedness and headaches. Psychiatric/Behavioral: Negative for confusion and sleep disturbance. The patient is nervous/anxious. Except as noted above the remainder of the review of systems was reviewed and negative. PAST MEDICAL HISTORY     Past Medical History:   Diagnosis Date    Anxiety     Cirrhosis ([address]     Coronary artery disease involving native coronary artery of native heart without angina pectoris 1/8/2018    HTN (hypertension)          SURGICAL HISTORY       Past Surgical History:   Procedure Laterality Date    CORONARY ARTERY BYPASS GRAFT           CURRENT MEDICATIONS       Previous Medications    ACETAMINOPHEN (TYLENOL) 500 MG TABLET    Take 1,000 mg by mouth 2 times daily    AMLODIPINE (NORVASC) 5 MG TABLET    Take by mouth    ASPIRIN (ECOTRIN) 325 MG EC TABLET    Take 325 mg by mouth daily    ATENOLOL (TENORMIN) 25 MG TABLET    Take by mouth    ATORVASTATIN (LIPITOR) 20 MG TABLET    Take 20 mg by mouth nightly    CARVEDILOL (COREG) 6.25 MG TABLET    Take 1 tablet by mouth 2 times daily    DIAZEPAM (VALIUM) 2 MG TABLET    Take 5 mg by mouth 2 times daily. FAMOTIDINE (PEPCID) 20 MG TABLET    Take by mouth    METOCLOPRAMIDE (REGLAN) 10 MG TABLET    1 tablet every 6 hours as needed for nausea    ONDANSETRON (ZOFRAN) 4 MG TABLET    Take 1 tablet by mouth every 12 hours as needed for Nausea    PROBIOTIC PRODUCT (PROBIOTIC DAILY PO)    Take 1 capsule by mouth    SERTRALINE (ZOLOFT) 50 MG TABLET    Take 1 tablet by mouth daily    VALSARTAN (DIOVAN) 320 MG TABLET    Take 320 mg by mouth daily    VITAMIN B-12 (CYANOCOBALAMIN) 1000 MCG TABLET    Take 2,000 mcg by mouth daily       ALLERGIES     Carbapenems, Cephalosporins, Hydralazine, and Pcn [penicillins]    FAMILY HISTORY     History reviewed. No pertinent family history.        SOCIAL HISTORY       Social History     Socioeconomic History    Marital status:      Spouse name: None    Number of children: None    Years of education: None    Highest education level: None   Occupational History    None   Tobacco Use    Smoking status: Never Smoker    Smokeless tobacco: Never Used   Vaping Use    Vaping Use: Never used   Substance and Sexual Activity    Alcohol use: No    Drug use: No    Sexual activity: None   Other Topics Concern    None   Social History Narrative    None     Social Determinants of Health     Financial Resource Strain:     Difficulty of Paying Living Expenses:    Food Insecurity:     Worried About Running Out of Food in the Last Year:     Ran Out of Food in the Last Year:    Transportation Needs:     Lack of Transportation (Medical):  Lack of Transportation (Non-Medical):    Physical Activity:     Days of Exercise per Week:     Minutes of Exercise per Session:    Stress:     Feeling of Stress :    Social Connections:     Frequency of Communication with Friends and Family:     Frequency of Social Gatherings with Friends and Family:     Attends Oriental orthodox Services:     Active Member of Clubs or Organizations:     Attends Club or Organization Meetings:     Marital Status:    Intimate Partner Violence:     Fear of Current or Ex-Partner:     Emotionally Abused:     Physically Abused:     Sexually Abused:        SCREENINGS        Roberto Coma Scale  Eye Opening: Spontaneous  Best Verbal Response: Oriented  Best Motor Response: Obeys commands  [place] Coma Scale Score: 15               PHYSICAL EXAM    (up to 7 for level 4, 8 or more for level 5)     ED Triage Vitals [08/25/21 1637]   BP Temp Temp Source Pulse Resp SpO2 Height Weight   (!) 208/70 98.5 °F (36.9 °C) Oral 74 16 95 % 5' 2\" (1.575 m) 168 lb (76.2 kg)       Physical Exam  Vitals and nursing note reviewed. Constitutional:       General: She is not in acute distress. Appearance: She is well-developed. She is not diaphoretic. HENT:      Head: Normocephalic and atraumatic.       Right Ear: External ear normal.      Left Ear: External ear normal.      Mouth/Throat:      Pharynx: No oropharyngeal exudate. Eyes:      Conjunctiva/sclera: Conjunctivae normal.      Pupils: Pupils are equal, round, and reactive to light. Neck:      Thyroid: No thyromegaly. Vascular: No JVD. Trachea: No tracheal deviation. Cardiovascular:      Rate and Rhythm: Normal rate. Heart sounds: Normal heart sounds. No murmur heard. Pulmonary:      Effort: Pulmonary effort is normal. No respiratory distress. Breath sounds: Normal breath sounds. No wheezing. Abdominal:      General: Bowel sounds are normal.      Palpations: Abdomen is soft. Tenderness: There is no abdominal tenderness. There is no guarding. Musculoskeletal:         General: Normal range of motion. Cervical back: Normal range of motion and neck supple. Skin:     General: Skin is warm and dry. Findings: No rash. Neurological:      Mental Status: She is alert and oriented to person, place, and time. Cranial Nerves: No cranial nerve deficit. Psychiatric:         Behavior: Behavior normal.         DIAGNOSTIC RESULTS     EKG: All EKG's are interpreted by the Emergency Department Physician who either signs or Co-signs this chart in the absence of a cardiologist.      RADIOLOGY:   Non-plain film images such as CT, Ultrasound and MRI are read by the radiologist. Plain radiographic images are visualized and preliminarily interpreted by the emergency physician with the below findings:        Interpretation per the Radiologist below, if available at the time of this note:    No orders to display         ED BEDSIDE ULTRASOUND:   Performed by ED Physician - none    LABS:  Labs Reviewed - No data to display    All other labs were within normal range or not returned as of this dictation.     EMERGENCY DEPARTMENT COURSE and DIFFERENTIAL DIAGNOSIS/MDM:   Vitals:    Vitals:    08/25/21 1637 08/25/21 1700   BP: (!) 208/70 (!) 183/74   Pulse: 74 71   Resp: 16 18 Temp: 98.5 °F (36.9 °C)    TempSrc: Oral    SpO2: 95% 94%   Weight: 168 lb (76.2 kg)    Height: 5' 2\" (1.575 m)        Blood pressure was elevated on arrival.  Other than being anxious she did not have any symptoms that were concerning such as chest pain or shortness of breath or strokelike symptoms. Patient was given Norvasc 5 mg and I talked to her for some time. She is calm now and blood pressure is coming down. Recent labs were completely normal and did not need to be repeated. Discharge home follow-up with primary care    MDM      REASSESSMENT          CRITICAL CARE TIME   Total Critical Care time was 0 minutes, excluding separately reportable procedures. There was a high probability of clinically significant/life threatening deterioration in the patient's condition which required my urgent intervention. CONSULTS:  None    PROCEDURES:  Unless otherwise noted below, none     Procedures        FINAL IMPRESSION      1. Essential hypertension    2. Anxiety state          DISPOSITION/PLAN   DISPOSITION        PATIENT REFERRED TO:  Katherine Gilliam DO  [address] [place] [address] [phone]            DISCHARGE MEDICATIONS:  New Prescriptions    No medications on file     Controlled Substances Monitoring:     No flowsheet data found.     (Please note that portions of this note were completed with a voice recognition program.  Efforts were made to edit the dictations but occasionally words are mis-transcribed.)    Lilia Calderon DO (electronically signed)  Attending Emergency Physician            Lilia Calderon DO  08/25/21 Chance Talbert DO  08/25/21 2469 No

## 2022-11-06 ENCOUNTER — HOSPITAL ENCOUNTER (EMERGENCY)
Age: 87
Discharge: HOME | End: 2022-11-06
Attending: EMERGENCY MEDICINE
Payer: MEDICARE

## 2022-11-06 VITALS
SYSTOLIC BLOOD PRESSURE: 134 MMHG | OXYGEN SATURATION: 93 % | WEIGHT: 160 LBS | BODY MASS INDEX: 29.26 KG/M2 | TEMPERATURE: 97.8 F | RESPIRATION RATE: 25 BRPM | DIASTOLIC BLOOD PRESSURE: 56 MMHG | HEART RATE: 85 BPM

## 2022-11-06 DIAGNOSIS — I10 HYPERTENSION, UNSPECIFIED TYPE: Primary | ICD-10-CM

## 2022-11-06 PROCEDURE — 99282 EMERGENCY DEPT VISIT SF MDM: CPT

## 2022-11-06 ASSESSMENT — PAIN - FUNCTIONAL ASSESSMENT: PAIN_FUNCTIONAL_ASSESSMENT: NONE - DENIES PAIN

## 2022-11-06 ASSESSMENT — LIFESTYLE VARIABLES
HOW MANY STANDARD DRINKS CONTAINING ALCOHOL DO YOU HAVE ON A TYPICAL DAY: PATIENT DOES NOT DRINK
HOW OFTEN DO YOU HAVE A DRINK CONTAINING ALCOHOL: NEVER

## 2022-11-06 NOTE — ED TRIAGE NOTES
Patient states that she has a history of hypertension. Patient states that she checked her blood pressure this afternoon and her SBP was greater than 200. Patient states that around 1600 she took a dose of Norvasc. Patient states that her blood pressure remained elevated so she came to the emergency department to be evaluated.

## 2022-11-07 NOTE — ED PROVIDER NOTES
[facility] ED  EMERGENCY DEPARTMENT ENCOUNTER      Pt Name: Jose Valencia  MRN: [id number]  [place] 1935  Date of evaluation: 11/6/2022  Provider: Raina Kirk MD    CHIEF COMPLAINT       Chief Complaint   Patient presents with    Hypertension         HISTORY OF PRESENT ILLNESS   (Location/Symptom, Timing/Onset, Context/Setting, Quality, Duration, Modifying Factors, Severity)  Note limiting factors. Jose Valencia is a 82 y.o. female who presents to the emergency department for HTN, h/o CAD s/p CABG, HTN, TIA. Patient states that she checks her blood pressure every afternoon and evening. She checked it today and systolic blood pressure was greater than 200. She took her home medications and came into the emergency department via private vehicle with her . She had no point today had associated symptoms. Denies headache or acute vision change, neck or jaw pain, chest pain or difficulty breathing, vomiting, abdominal pain, numbness or weakness, syncope, dizziness. Patient says she is feeling well and wants us to watch her blood pressure here in the department. HPI    Nursing Notes were reviewed. REVIEW OF SYSTEMS    (2-9 systems for level 4, 10 or more for level 5)     Review of Systems   Constitutional:         Asymptomatic high blood pressure   Neurological:  Negative for speech difficulty, weakness and numbness. Psychiatric/Behavioral:  Negative for confusion. All other systems reviewed and are negative. Except as noted above the remainder of the review of systems was reviewed and negative.        PAST MEDICAL HISTORY     Past Medical History:   Diagnosis Date    Anxiety     Cirrhosis ([address]     Coronary artery disease involving native coronary artery of native heart without angina pectoris 1/8/2018    HTN (hypertension)          SURGICAL HISTORY       Past Surgical History:   Procedure Laterality Date    CORONARY ARTERY BYPASS GRAFT           CURRENT MEDICATIONS Previous Medications    ACETAMINOPHEN (TYLENOL) 500 MG TABLET    Take 1,000 mg by mouth 2 times daily    AMLODIPINE (NORVASC) 5 MG TABLET    Take 1 tablet by mouth daily as needed (if systolic BP > 774)    ASPIRIN (ECOTRIN) 325 MG EC TABLET    Take 325 mg by mouth daily    ATORVASTATIN (LIPITOR) 20 MG TABLET    Take 20 mg by mouth nightly    CARVEDILOL (COREG) 6.25 MG TABLET    Take 2 tablets by mouth 2 times daily    DIAZEPAM (VALIUM) 5 MG TABLET    Take 2.5 mg by mouth 2 times daily. FAMOTIDINE (PEPCID) 20 MG TABLET    Take by mouth    ONDANSETRON (ZOFRAN) 4 MG TABLET    Take 1 tablet by mouth every 12 hours as needed for Nausea    PROBIOTIC PRODUCT (PROBIOTIC DAILY PO)    Take 1 capsule by mouth    SERTRALINE (ZOLOFT) 50 MG TABLET    Take 1 tablet by mouth daily    VALSARTAN (DIOVAN) 320 MG TABLET    Take 1 tablet by mouth at bedtime       ALLERGIES     Carbapenems, Cephalosporins, Hydralazine, and Pcn [penicillins]    FAMILY HISTORY     History reviewed. No pertinent family history.        SOCIAL HISTORY       Social History     Socioeconomic History    Marital status:      Spouse name: None    Number of children: None    Years of education: None    Highest education level: None   Tobacco Use    Smoking status: Never    Smokeless tobacco: Never   Vaping Use    Vaping Use: Never used   Substance and Sexual Activity    Alcohol use: No    Drug use: No       SCREENINGS         Roberto Coma Scale  Eye Opening: Spontaneous  Best Verbal Response: Oriented  Best Motor Response: Obeys commands  [place] Coma Scale Score: 15                     CIWA Assessment  BP: (!) 134/56  Heart Rate: 85                 PHYSICAL EXAM    (up to 7 for level 4, 8 or more for level 5)     ED Triage Vitals   BP Temp Temp Source Heart Rate Resp SpO2 Height Weight   11/06/22 1848 11/06/22 1848 11/06/22 1848 11/06/22 1848 11/06/22 1848 11/06/22 1853 -- 11/06/22 1848   (!) 174/80 97.8 °F (36.6 °C) Oral (!) 109 18 99 %  160 lb (72.6 kg)       Physical Exam  Vitals reviewed. Constitutional:       General: She is not in acute distress. Appearance: She is not toxic-appearing or diaphoretic. HENT:      Head: Normocephalic and atraumatic. Nose: Nose normal.      Mouth/Throat:      Mouth: Mucous membranes are moist.   Eyes:      General: No scleral icterus. Extraocular Movements: Extraocular movements intact. Pupils: Pupils are equal, round, and reactive to light. Cardiovascular:      Rate and Rhythm: Normal rate and regular rhythm. Pulses: Normal pulses. Heart sounds:     No gallop. Pulmonary:      Effort: Pulmonary effort is normal.      Breath sounds: Normal breath sounds. Abdominal:      General: There is no distension. Tenderness: There is no abdominal tenderness. There is no guarding or rebound. Musculoskeletal:      Cervical back: Normal range of motion. No rigidity. Right lower leg: No edema. Left lower leg: No edema. Skin:     Capillary Refill: Capillary refill takes less than 2 seconds. Findings: No rash. Neurological:      General: No focal deficit present. Mental Status: She is alert and oriented to person, place, and time. Cranial Nerves: No cranial nerve deficit. Sensory: No sensory deficit. Motor: No weakness. Psychiatric:         Mood and Affect: Mood normal.       DIAGNOSTIC RESULTS     Intepretation per the Radiologist below, if available at the time of this note:    No orders to display         ED BEDSIDE ULTRASOUND:   Performed by ED Physician - none    LABS:  Labs Reviewed - No data to display    All other labs were within normal range or not returned as of this dictation.     EMERGENCY DEPARTMENT COURSE and DIFFERENTIAL DIAGNOSIS/MDM:   Vitals:    Vitals:    11/06/22 1900 11/06/22 1905 11/06/22 1915 11/06/22 1930   BP: (!) 164/71 (!) 157/65 (!) 147/62 (!) 134/56   Pulse: 97 90 89 85   Resp:  30 29 25   Temp:       TempSrc:       SpO2: 94% 96% 94% 93%   Weight:               MDM  Patient has asymptomatic hypertension. Her blood pressure is trending down after taking oral medication at home. No concern for hypertensive urgency or emergency. Appropriate for discharge. Understands return precautions. CONSULTS:  None    PROCEDURES:  Unless otherwise noted below, none     Procedures      FINAL IMPRESSION      1. Hypertension, unspecified type          DISPOSITION/PLAN   DISPOSITION Decision To Discharge 11/06/2022 07:36:52 PM      PATIENT REFERRED TO:  Tamica Osorio DO  [address] [place] [address] [phone]          DISCHARGE MEDICATIONS:  New Prescriptions    No medications on file     Controlled Substances Monitoring:     No flowsheet data found.     (Please note that portions of this note were completed with a voice recognition program.  Efforts were made to edit the dictations but occasionally words are mis-transcribed.)    Fede Cary MD (electronically signed)  Attending Emergency Physician            Fede Cary MD  11/06/22 1952

## 2022-11-07 NOTE — DISCHARGE INSTRUCTIONS
Return for headache, chest pain, difficulty breathing, worsening symptoms or concerns. Continue to monitor your blood pressure. Thank you.

## 2022-11-07 NOTE — ED NOTES
Discharge instructions reviewed with patient. Verbalized understanding. A&O x4. Skin p/w/d. Respirations even and unlabored. No acute distress noted at this time. Patient amb with steady gait on discharge.          Ede Espana RN  11/06/22 2000

## 2023-01-23 PROBLEM — U07.1 COVID-19: Status: ACTIVE | Noted: 2023-01-23

## 2023-01-23 PROBLEM — N60.19 FIBROCYSTIC BREAST DISEASE: Status: ACTIVE | Noted: 2023-01-23

## 2023-01-23 PROBLEM — F41.0 PANIC ANXIETY SYNDROME: Status: ACTIVE | Noted: 2023-01-23

## 2023-01-23 PROBLEM — M25.512 LEFT SHOULDER PAIN: Status: ACTIVE | Noted: 2023-01-23

## 2023-01-23 PROBLEM — R10.11 RIGHT UPPER QUADRANT ABDOMINAL PAIN: Status: ACTIVE | Noted: 2023-01-23

## 2023-01-23 PROBLEM — N95.2 POSTMENOPAUSAL ATROPHIC VAGINITIS: Status: ACTIVE | Noted: 2023-01-23

## 2023-01-23 PROBLEM — N39.0 ACUTE LOWER UTI: Status: ACTIVE | Noted: 2023-01-23

## 2023-01-23 PROBLEM — M47.816 DEGENERATIVE ARTHRITIS OF LUMBAR SPINE: Status: ACTIVE | Noted: 2023-01-23

## 2023-01-23 PROBLEM — K02.9 DENTAL CARIES: Status: ACTIVE | Noted: 2023-01-23

## 2023-01-23 PROBLEM — R51.9 HEADACHE, WORSENING: Status: ACTIVE | Noted: 2023-01-23

## 2023-01-23 PROBLEM — R00.2 PALPITATIONS: Status: ACTIVE | Noted: 2023-01-23

## 2023-01-23 PROBLEM — A05.9 FOOD POISONING: Status: ACTIVE | Noted: 2023-01-23

## 2023-01-23 PROBLEM — R11.0 NAUSEA IN ADULT: Status: ACTIVE | Noted: 2023-01-23

## 2023-01-23 PROBLEM — R15.9 STOOL INCONTINENCE: Status: ACTIVE | Noted: 2023-01-23

## 2023-01-23 PROBLEM — I10 HYPERTENSION: Status: ACTIVE | Noted: 2023-01-23

## 2023-01-23 PROBLEM — E87.1 HYPONATREMIA: Status: ACTIVE | Noted: 2023-01-23

## 2023-01-23 PROBLEM — L30.9 ECZEMA: Status: ACTIVE | Noted: 2023-01-23

## 2023-01-23 PROBLEM — B35.1 ONYCHOMYCOSIS: Status: ACTIVE | Noted: 2023-01-23

## 2023-01-23 PROBLEM — E78.5 HYPERLIPIDEMIA: Status: ACTIVE | Noted: 2023-01-23

## 2023-01-23 PROBLEM — F43.21 GRIEF: Status: ACTIVE | Noted: 2023-01-23

## 2023-01-23 PROBLEM — K29.70 GASTRITIS: Status: ACTIVE | Noted: 2023-01-23

## 2023-01-23 PROBLEM — L71.9 ACNE ROSACEA: Status: ACTIVE | Noted: 2023-01-23

## 2023-01-23 PROBLEM — M25.551 HIP PAIN, RIGHT: Status: ACTIVE | Noted: 2023-01-23

## 2023-01-23 PROBLEM — K64.9 HEMORRHOID: Status: ACTIVE | Noted: 2023-01-23

## 2023-01-23 PROBLEM — K21.9 GERD (GASTROESOPHAGEAL REFLUX DISEASE): Status: ACTIVE | Noted: 2023-01-23

## 2023-01-23 PROBLEM — F41.9 ANXIETY: Status: ACTIVE | Noted: 2023-01-23

## 2023-01-23 PROBLEM — L08.1 ERYTHRASMA: Status: ACTIVE | Noted: 2023-01-23

## 2023-01-23 PROBLEM — F32.A DEPRESSION: Status: ACTIVE | Noted: 2023-01-23

## 2023-01-23 PROBLEM — K58.9 IBS (IRRITABLE BOWEL SYNDROME): Status: ACTIVE | Noted: 2023-01-23

## 2023-01-23 PROBLEM — H53.9 VISUAL DISTURBANCE: Status: ACTIVE | Noted: 2023-01-23

## 2023-01-23 PROBLEM — R10.2 VAGINAL PAIN: Status: ACTIVE | Noted: 2023-01-23

## 2023-01-23 PROBLEM — M25.511 SHOULDER PAIN, BILATERAL: Status: ACTIVE | Noted: 2023-01-23

## 2023-01-23 PROBLEM — R53.83 FATIGUE: Status: ACTIVE | Noted: 2023-01-23

## 2023-01-23 PROBLEM — M79.10 MUSCLE PAIN: Status: ACTIVE | Noted: 2023-01-23

## 2023-01-23 PROBLEM — M25.512 SHOULDER PAIN, BILATERAL: Status: ACTIVE | Noted: 2023-01-23

## 2023-01-23 PROBLEM — B37.31 CANDIDA VAGINITIS: Status: ACTIVE | Noted: 2023-01-23

## 2023-01-23 PROBLEM — R51.9 TEMPORAL PAIN: Status: ACTIVE | Noted: 2023-01-23

## 2023-01-23 PROBLEM — G45.9 TIA (TRANSIENT ISCHEMIC ATTACK): Status: ACTIVE | Noted: 2023-01-23

## 2023-01-23 PROBLEM — M19.90 OSTEOARTHRITIS: Status: ACTIVE | Noted: 2023-01-23

## 2023-01-23 PROBLEM — E73.9 LACTOSE INTOLERANCE: Status: ACTIVE | Noted: 2023-01-23

## 2023-01-23 PROBLEM — J01.90 ACUTE SINUSITIS: Status: ACTIVE | Noted: 2023-01-23

## 2023-01-23 PROBLEM — R55 NEAR SYNCOPE: Status: ACTIVE | Noted: 2023-01-23

## 2023-01-23 PROBLEM — M25.561 KNEE PAIN, RIGHT: Status: ACTIVE | Noted: 2023-01-23

## 2023-01-23 RX ORDER — CARVEDILOL 6.25 MG/1
6.25 TABLET ORAL 2 TIMES DAILY
COMMUNITY
End: 2023-11-08 | Stop reason: SDUPTHER

## 2023-01-23 RX ORDER — AMLODIPINE BESYLATE 5 MG/1
5 TABLET ORAL DAILY
COMMUNITY
End: 2023-04-28 | Stop reason: SDUPTHER

## 2023-01-23 RX ORDER — FAMOTIDINE 20 MG/1
1 TABLET, FILM COATED ORAL 2 TIMES DAILY
COMMUNITY
Start: 2021-08-06 | End: 2023-08-09 | Stop reason: ALTCHOICE

## 2023-01-23 RX ORDER — ATORVASTATIN CALCIUM 20 MG/1
20 TABLET, FILM COATED ORAL DAILY
COMMUNITY
End: 2023-04-28 | Stop reason: SDUPTHER

## 2023-01-23 RX ORDER — LIDOCAINE 50 MG/G
1 PATCH TOPICAL DAILY
COMMUNITY
Start: 2022-08-09 | End: 2023-08-09 | Stop reason: ALTCHOICE

## 2023-01-23 RX ORDER — SERTRALINE HYDROCHLORIDE 50 MG/1
50 TABLET, FILM COATED ORAL DAILY
COMMUNITY
Start: 2021-08-06 | End: 2023-04-28 | Stop reason: SDUPTHER

## 2023-01-23 RX ORDER — WITCH HAZEL 50 %
1000 PADS, MEDICATED (EA) TOPICAL DAILY
COMMUNITY
End: 2023-08-09 | Stop reason: ALTCHOICE

## 2023-03-01 ENCOUNTER — DOCUMENTATION (OUTPATIENT)
Dept: PRIMARY CARE | Facility: CLINIC | Age: 88
End: 2023-03-01
Payer: MEDICARE

## 2023-04-10 ENCOUNTER — APPOINTMENT (OUTPATIENT)
Dept: PRIMARY CARE | Facility: CLINIC | Age: 88
End: 2023-04-10
Payer: MEDICARE

## 2023-04-14 ENCOUNTER — PATIENT OUTREACH (OUTPATIENT)
Dept: PRIMARY CARE | Facility: CLINIC | Age: 88
End: 2023-04-14
Payer: MEDICARE

## 2023-04-14 DIAGNOSIS — I10 HYPERTENSION, UNSPECIFIED TYPE: ICD-10-CM

## 2023-04-14 DIAGNOSIS — G45.9 TIA (TRANSIENT ISCHEMIC ATTACK): ICD-10-CM

## 2023-04-14 PROCEDURE — 99490 CHRNC CARE MGMT STAFF 1ST 20: CPT | Performed by: FAMILY MEDICINE

## 2023-04-14 NOTE — CARE PLAN
Reviewed chart as pt new to me as covering CM  Sent pt my info via email   Discussed missed appt. Related to her pain/arthritis. Stated her daughter will have to drive her as she does not drive any longer. Denied falls  Encouraged her to make appt. She stated she will call.   Pain does not limit her activity in the home but does outside the home. Need to follow up about rhematology.   Weight loss, stated is eating ok. Daughter cooks for her  Pt was upbeat. BP is under 140/90 but did not  have exact numbers. Stated at times feels like she is in a fog but no dizziness.   Will follow up next week on rheumatology follow up and pcp appt. Pt stated she will call next week to have follow up.

## 2023-04-28 DIAGNOSIS — F32.A DEPRESSION, UNSPECIFIED DEPRESSION TYPE: ICD-10-CM

## 2023-04-28 DIAGNOSIS — E78.5 HYPERLIPIDEMIA, UNSPECIFIED HYPERLIPIDEMIA TYPE: ICD-10-CM

## 2023-04-28 DIAGNOSIS — I15.9 SECONDARY HYPERTENSION: ICD-10-CM

## 2023-05-01 RX ORDER — SERTRALINE HYDROCHLORIDE 50 MG/1
50 TABLET, FILM COATED ORAL DAILY
Qty: 90 TABLET | Refills: 0 | Status: SHIPPED | OUTPATIENT
Start: 2023-05-01 | End: 2023-08-09 | Stop reason: SDUPTHER

## 2023-05-01 RX ORDER — ATORVASTATIN CALCIUM 20 MG/1
20 TABLET, FILM COATED ORAL DAILY
Qty: 90 TABLET | Refills: 0 | Status: SHIPPED | OUTPATIENT
Start: 2023-05-01 | End: 2023-08-09 | Stop reason: SDUPTHER

## 2023-05-01 RX ORDER — AMLODIPINE BESYLATE 5 MG/1
5 TABLET ORAL DAILY
Qty: 90 TABLET | Refills: 0 | Status: SHIPPED | OUTPATIENT
Start: 2023-05-01 | End: 2023-08-09 | Stop reason: SDUPTHER

## 2023-05-02 NOTE — TELEPHONE ENCOUNTER
Please schedule an appointment for medicine is gone have sent in 90-day supply.  In the event that you cannot get in before this 90-day supply please call my office.Thanks much Dr. Sandy.

## 2023-05-04 ENCOUNTER — PATIENT OUTREACH (OUTPATIENT)
Dept: PRIMARY CARE | Facility: CLINIC | Age: 88
End: 2023-05-04
Payer: MEDICARE

## 2023-05-04 DIAGNOSIS — I10 HYPERTENSION, UNSPECIFIED TYPE: ICD-10-CM

## 2023-05-04 DIAGNOSIS — F41.9 ANXIETY: ICD-10-CM

## 2023-05-16 ENCOUNTER — PATIENT OUTREACH (OUTPATIENT)
Dept: PRIMARY CARE | Facility: CLINIC | Age: 88
End: 2023-05-16
Payer: MEDICARE

## 2023-05-31 ENCOUNTER — PATIENT OUTREACH (OUTPATIENT)
Dept: PRIMARY CARE | Facility: CLINIC | Age: 88
End: 2023-05-31
Payer: MEDICARE

## 2023-05-31 NOTE — PROGRESS NOTES
Left message for patient to assist in scheduling Annual Wellness Exam.   Introduced myself as their Patient Navigator for their PCP office and how I can assist them in the future.     I asked patient to call back directly at [phone] ; I am able to assist in scheduling Annual Wellness Exam along with any other resources they might need.

## 2023-06-06 ENCOUNTER — PATIENT OUTREACH (OUTPATIENT)
Dept: PRIMARY CARE | Facility: CLINIC | Age: 88
End: 2023-06-06
Payer: MEDICARE

## 2023-06-06 DIAGNOSIS — I10 HYPERTENSION, UNSPECIFIED TYPE: ICD-10-CM

## 2023-06-06 DIAGNOSIS — G45.9 TIA (TRANSIENT ISCHEMIC ATTACK): ICD-10-CM

## 2023-06-19 ENCOUNTER — TELEPHONE (OUTPATIENT)
Dept: PRIMARY CARE | Facility: CLINIC | Age: 88
End: 2023-06-19
Payer: MEDICARE

## 2023-06-23 ENCOUNTER — PATIENT OUTREACH (OUTPATIENT)
Dept: PRIMARY CARE | Facility: CLINIC | Age: 88
End: 2023-06-23
Payer: MEDICARE

## 2023-07-06 ENCOUNTER — PATIENT OUTREACH (OUTPATIENT)
Dept: PRIMARY CARE | Facility: CLINIC | Age: 88
End: 2023-07-06
Payer: MEDICARE

## 2023-07-19 ENCOUNTER — PATIENT OUTREACH (OUTPATIENT)
Dept: PRIMARY CARE | Facility: CLINIC | Age: 88
End: 2023-07-19
Payer: MEDICARE

## 2023-08-02 ENCOUNTER — PATIENT OUTREACH (OUTPATIENT)
Dept: PRIMARY CARE | Facility: CLINIC | Age: 88
End: 2023-08-02
Payer: MEDICARE

## 2023-08-02 DIAGNOSIS — I10 HYPERTENSION, UNSPECIFIED TYPE: ICD-10-CM

## 2023-08-02 DIAGNOSIS — G45.9 TIA (TRANSIENT ISCHEMIC ATTACK): ICD-10-CM

## 2023-08-02 PROCEDURE — 99490 CHRNC CARE MGMT STAFF 1ST 20: CPT | Performed by: FAMILY MEDICINE

## 2023-08-02 NOTE — PROGRESS NOTES
RN CM outreach call placed and spoke with Mayela.  She states that she has been unable to leave her home because her  has been ill and she is primary caregiver.  She was very happy to schedule an appointment with Dr. Sandy.  She has been very uncomfortable with arthritis pain and an area inside her mouth.  I did assist her in scheduling an appointment.  She reports that her blood pressure has been within normal limits.

## 2023-08-09 ENCOUNTER — OFFICE VISIT (OUTPATIENT)
Dept: PRIMARY CARE | Facility: CLINIC | Age: 88
End: 2023-08-09
Payer: MEDICARE

## 2023-08-09 VITALS
BODY MASS INDEX: 27.05 KG/M2 | HEIGHT: 62 IN | DIASTOLIC BLOOD PRESSURE: 76 MMHG | RESPIRATION RATE: 18 BRPM | WEIGHT: 147 LBS | SYSTOLIC BLOOD PRESSURE: 134 MMHG | OXYGEN SATURATION: 95 % | HEART RATE: 82 BPM | TEMPERATURE: 97 F

## 2023-08-09 DIAGNOSIS — R53.83 OTHER FATIGUE: ICD-10-CM

## 2023-08-09 DIAGNOSIS — Z23 NEED FOR VACCINATION: ICD-10-CM

## 2023-08-09 DIAGNOSIS — K02.9 DENTAL CARIES: ICD-10-CM

## 2023-08-09 DIAGNOSIS — F32.A DEPRESSION, UNSPECIFIED DEPRESSION TYPE: ICD-10-CM

## 2023-08-09 DIAGNOSIS — E53.8 VITAMIN B 12 DEFICIENCY: ICD-10-CM

## 2023-08-09 DIAGNOSIS — Z12.31 ENCOUNTER FOR SCREENING MAMMOGRAM FOR BREAST CANCER: ICD-10-CM

## 2023-08-09 DIAGNOSIS — Z78.0 ASYMPTOMATIC MENOPAUSAL STATE: ICD-10-CM

## 2023-08-09 DIAGNOSIS — E55.9 VITAMIN D DEFICIENCY: ICD-10-CM

## 2023-08-09 DIAGNOSIS — I15.9 SECONDARY HYPERTENSION: ICD-10-CM

## 2023-08-09 DIAGNOSIS — Z13.1 DIABETES MELLITUS SCREENING: ICD-10-CM

## 2023-08-09 DIAGNOSIS — Z00.00 ROUTINE GENERAL MEDICAL EXAMINATION AT HEALTH CARE FACILITY: Primary | ICD-10-CM

## 2023-08-09 DIAGNOSIS — E78.5 HYPERLIPIDEMIA, UNSPECIFIED HYPERLIPIDEMIA TYPE: ICD-10-CM

## 2023-08-09 DIAGNOSIS — I10 BENIGN ESSENTIAL HYPERTENSION: ICD-10-CM

## 2023-08-09 PROBLEM — R07.9 CHEST PAIN: Status: ACTIVE | Noted: 2021-06-10

## 2023-08-09 PROBLEM — R27.0 ATAXIA, UNSPECIFIED: Status: ACTIVE | Noted: 2018-04-28

## 2023-08-09 PROBLEM — R41.0 CONFUSION AND DISORIENTATION: Status: ACTIVE | Noted: 2023-08-09

## 2023-08-09 PROBLEM — R94.31 ABNORMAL FINDING ON EKG: Status: ACTIVE | Noted: 2018-01-08

## 2023-08-09 PROBLEM — R47.01 APHASIA: Status: ACTIVE | Noted: 2023-08-09

## 2023-08-09 PROBLEM — R41.82 CHANGE IN MENTAL STATE: Status: ACTIVE | Noted: 2022-07-10

## 2023-08-09 PROBLEM — Z95.1 HX OF CABG: Status: ACTIVE | Noted: 2023-08-09

## 2023-08-09 PROBLEM — I20.89 ANGINA AT REST (CMS-HCC): Status: ACTIVE | Noted: 2021-06-12

## 2023-08-09 PROBLEM — K74.60 UNSPECIFIED CIRRHOSIS OF LIVER (MULTI): Status: ACTIVE | Noted: 2023-08-09

## 2023-08-09 PROBLEM — I25.10 CORONARY ARTERY DISEASE INVOLVING NATIVE CORONARY ARTERY OF NATIVE HEART WITHOUT ANGINA PECTORIS: Status: ACTIVE | Noted: 2018-01-08

## 2023-08-09 LAB — POC HEMOGLOBIN A1C: 5.7 % (ref 4.2–6.5)

## 2023-08-09 PROCEDURE — G0439 PPPS, SUBSEQ VISIT: HCPCS | Performed by: FAMILY MEDICINE

## 2023-08-09 PROCEDURE — 90677 PCV20 VACCINE IM: CPT | Performed by: FAMILY MEDICINE

## 2023-08-09 PROCEDURE — 1170F FXNL STATUS ASSESSED: CPT | Performed by: FAMILY MEDICINE

## 2023-08-09 PROCEDURE — 1036F TOBACCO NON-USER: CPT | Performed by: FAMILY MEDICINE

## 2023-08-09 PROCEDURE — G0009 ADMIN PNEUMOCOCCAL VACCINE: HCPCS | Performed by: FAMILY MEDICINE

## 2023-08-09 PROCEDURE — 99214 OFFICE O/P EST MOD 30 MIN: CPT | Performed by: FAMILY MEDICINE

## 2023-08-09 PROCEDURE — 3075F SYST BP GE 130 - 139MM HG: CPT | Performed by: FAMILY MEDICINE

## 2023-08-09 PROCEDURE — 1159F MED LIST DOCD IN RCRD: CPT | Performed by: FAMILY MEDICINE

## 2023-08-09 PROCEDURE — 1160F RVW MEDS BY RX/DR IN RCRD: CPT | Performed by: FAMILY MEDICINE

## 2023-08-09 PROCEDURE — 83036 HEMOGLOBIN GLYCOSYLATED A1C: CPT | Performed by: FAMILY MEDICINE

## 2023-08-09 PROCEDURE — 3078F DIAST BP <80 MM HG: CPT | Performed by: FAMILY MEDICINE

## 2023-08-09 RX ORDER — CHLORHEXIDINE GLUCONATE ORAL RINSE 1.2 MG/ML
15 SOLUTION DENTAL AS NEEDED
Qty: 473 ML | Refills: 0 | Status: SHIPPED | OUTPATIENT
Start: 2023-08-09 | End: 2023-08-23

## 2023-08-09 RX ORDER — AMLODIPINE BESYLATE 5 MG/1
5 TABLET ORAL DAILY
Qty: 90 TABLET | Refills: 3 | Status: SHIPPED | OUTPATIENT
Start: 2023-08-09

## 2023-08-09 RX ORDER — CLINDAMYCIN HYDROCHLORIDE 300 MG/1
300 CAPSULE ORAL 3 TIMES DAILY
Qty: 21 CAPSULE | Refills: 2 | Status: SHIPPED | OUTPATIENT
Start: 2023-08-09 | End: 2023-08-16

## 2023-08-09 RX ORDER — SERTRALINE HYDROCHLORIDE 50 MG/1
50 TABLET, FILM COATED ORAL DAILY
Qty: 90 TABLET | Refills: 3 | Status: SHIPPED | OUTPATIENT
Start: 2023-08-09 | End: 2023-11-08 | Stop reason: SDUPTHER

## 2023-08-09 RX ORDER — ATORVASTATIN CALCIUM 20 MG/1
20 TABLET, FILM COATED ORAL DAILY
Qty: 90 TABLET | Refills: 3 | Status: SHIPPED | OUTPATIENT
Start: 2023-08-09 | End: 2023-09-28 | Stop reason: SDUPTHER

## 2023-08-09 ASSESSMENT — ACTIVITIES OF DAILY LIVING (ADL)
BATHING: INDEPENDENT
TAKING_MEDICATION: INDEPENDENT
DOING_HOUSEWORK: INDEPENDENT
MANAGING_FINANCES: INDEPENDENT
GROCERY_SHOPPING: TOTAL CARE
DRESSING: INDEPENDENT

## 2023-08-09 ASSESSMENT — PATIENT HEALTH QUESTIONNAIRE - PHQ9
9. THOUGHTS THAT YOU WOULD BE BETTER OFF DEAD, OR OF HURTING YOURSELF: NOT AT ALL
SUM OF ALL RESPONSES TO PHQ9 QUESTIONS 1 AND 2: 6
1. LITTLE INTEREST OR PLEASURE IN DOING THINGS: NEARLY EVERY DAY
10. IF YOU CHECKED OFF ANY PROBLEMS, HOW DIFFICULT HAVE THESE PROBLEMS MADE IT FOR YOU TO DO YOUR WORK, TAKE CARE OF THINGS AT HOME, OR GET ALONG WITH OTHER PEOPLE: NOT DIFFICULT AT ALL
7. TROUBLE CONCENTRATING ON THINGS, SUCH AS READING THE NEWSPAPER OR WATCHING TELEVISION: NEARLY EVERY DAY
5. POOR APPETITE OR OVEREATING: SEVERAL DAYS
6. FEELING BAD ABOUT YOURSELF - OR THAT YOU ARE A FAILURE OR HAVE LET YOURSELF OR YOUR FAMILY DOWN: NOT AT ALL
4. FEELING TIRED OR HAVING LITTLE ENERGY: NEARLY EVERY DAY
SUM OF ALL RESPONSES TO PHQ QUESTIONS 1-9: 13
8. MOVING OR SPEAKING SO SLOWLY THAT OTHER PEOPLE COULD HAVE NOTICED. OR THE OPPOSITE, BEING SO FIGETY OR RESTLESS THAT YOU HAVE BEEN MOVING AROUND A LOT MORE THAN USUAL: NOT AT ALL
2. FEELING DOWN, DEPRESSED OR HOPELESS: NEARLY EVERY DAY
3. TROUBLE FALLING OR STAYING ASLEEP OR SLEEPING TOO MUCH: NOT AT ALL

## 2023-08-09 ASSESSMENT — ENCOUNTER SYMPTOMS
DEPRESSION: 1
OCCASIONAL FEELINGS OF UNSTEADINESS: 1
LOSS OF SENSATION IN FEET: 0

## 2023-08-09 NOTE — ASSESSMENT & PLAN NOTE
Cholesterol reviewed dated 9/22 with cholesterol 140 HDL 48 LDL was 72 triglycerides 118 Numbers excellent we will repeat labs and please do fasting stable check labs to ensure stability

## 2023-08-09 NOTE — ASSESSMENT & PLAN NOTE
Please consider appointment with dentist and will give you phone number of my dentist on [address], Doctor albert

## 2023-08-09 NOTE — PATIENT INSTRUCTIONS
Please consider exercise program involving walking or some other form of aerobic activity 5 days weekly for 30 minutes... Let's also consider strengthening of large muscle groups like the abdominal muscles or shoulder muscles... Twice weekly with reps of 5/10/15 exercises and gradually increase strength.. This is not heavy strength training but light weight training... Sit ups or back exercise routine.. Please ask for handout if uncertain how to do..This  will help to strengthen your muscles which in turn will help you to lose weight.... You might ask what is the best diet available.. I would strongly encourage you to consider  Weight Watchers.. And as  your  fellow on  Weight Watchers physician attempting to  live this  LIFE  style  choice with you....  I will be glad to give you recommendations on what to eat.. Consider buying Fallon bread.., jesus bagle thin bread.. oikos yogurt... eggs  to eat as hard-boiled... Halo top ice cream for snack... All these are delicious foods which.. when eaten and  being compliant eating three  meals daily  breakfast lunch and dinner, drinking  64 ounces of water daily we will all win together !!!!!!!. This will be a means for you to lose weight... Consider also the smart phone sharon ... My plate.. Or My  fitness  pal..,  as additional possibilities for weight loss... Good  luck  Dr. YASSINE Sandy!    Please consider the following medications to help    mitigate  Covid during this time  Vitamin C 500 mg  TWICE daily  B complex daily  b12 2500 international units   ZINC   30 -  v DAILY     VITAMIN D 3   500O IU DAILY        Multivitamin with zinc  Extra rest  Stress reduction  IN  SYMPTOMATIC  PATIENTS, MONITORING WITH  HOME PULSE OXIMETRY  IS RECOMMENDED..  AMBULATORY  DESATURATIONS BELOW  94%  SHOULD PROMPT HOSPITAL  ADMISSSION   Discussed medication side effects.  The  risk benefits and treatment options  discussed with patient.       Please schedule follow-up appointment based upon  your improvement/failure to improve/chronic medical conditions and physician recommendations during office appointment at the .       Patient advised to go to er if symptoms worsen or to call answering service, or to return to office for additional evaluation    This note was partially  generated using Dragon voice recognition and there may be incorrect words, wording, spelling, or pronunciation errors that were not corrected prior to committing the note to the medical record.     You can continue on the additional vitamin D supplement of 5000 international units until the end of May of this calendar year .  Starting June.. Please continue with the vitamin D with your multivitamin and calcium supplement, this will be from June to October of ths calendar year    So you can take the 5000 international units of vitamin D from November to March of every year.  The other months you will get vitamin D is you have before with a multivitamin and calcium supplement.  Please take your vitamin D with a handful of water unsalted almonds while with the meal.  Vitamin D is a fat-soluble vitamin that requires fat in the food to be well absorbed.  We recommend healthy fats such as with nuts, seeds, avocados, olives or with a healthy meal.  Also you may notice a multivitamin has some vitamin D.  Spending up to 30 minutes in the sun during the summer in late spring can provide natural vitamin D to the uncovered skin (arm, legs).    Calcium citrate 600 mg once daily and drink 1 cup of plant-based milk twice daily.  The plan based milk can be almond milk, soy milk, etc.    continue  diet with healthy calcium much foods such as:  Healthy foods that are rich in calcium include: Nuts, seeds, legumes/beans, peas, dark green leafy vegetables, plant-based milk  Additional calcium rich foods listed below  -Soaking almonds  overnight in the fridge with drinking water, can help with softening the almonds and improved absorption of the  almonds.  If your lab work shows insufficient calcium or you are unable to consume the foods rich in calcium  ...... some supplement is recommended, such as calcium citrate.    Please continue following with your dentist every 6 months  If you are on an osteoporosis medication, please inform a dentist that invasive dental work with these medications can increase once risk for osteonecrosis of the jaw.  Please continue with good oral hygiene and dental care.    Review of osteoporosis medications  Medications to prevent bone loss and osteoporosis fractures:  -Oral biphosphonate's (such as Actonel, Boniva, Fosamax/alendronate (these are taken either once a week or once a month depending on med dose chosen, first thing in the morning with special instructions to follow.    The duration should be limited to 5 years, to prevent increased risk for atypical fracture of femur.       Problem List Items Addressed This Visit       Dental caries     Please consider appointment with dentist and will give you phone number of my dentist on [address] Doctor sfeir         Relevant Medications    chlorhexidine (Peridex) 0.12 % solution    clindamycin (Cleocin) 300 mg capsule    Depression     Currently seems stable with dosage of antidepressant continue meds         Relevant Medications    sertraline (Zoloft) 50 mg tablet    Fatigue    Relevant Orders    CBC and Auto Differential    Hyperlipidemia     Cholesterol reviewed dated 9/22 with cholesterol 140 HDL 48 LDL was 72 triglycerides 118 Numbers excellent we will repeat labs and please do fasting stable check labs to ensure stability         Relevant Medications    atorvastatin (Lipitor) 20 mg tablet    Other Relevant Orders    Lipid Panel    Hypertension    Relevant Medications    amLODIPine (Norvasc) 5 mg tablet    Other Relevant Orders    Comprehensive Metabolic Panel    Benign essential hypertension     Blood pressure appears stable continue to check periodically if able 1  continue carvedilol and amlodipine.  Stable at this time check more frequently          Other Visit Diagnoses       Routine general medical examination at health care facility    -  Primary    Vitamin B 12 deficiency        Vitamin D deficiency        Relevant Orders    Vitamin D, Total    Diabetes mellitus screening        Relevant Orders    POCT glycosylated hemoglobin (Hb A1C) manually resulted    Need for vaccination        Relevant Medications    zoster vaccine-recombinant adjuvanted (Shingrix) 50 mcg/0.5 mL vaccine    Other Relevant Orders    Pneumococcal conjugate vaccine 20-valent IM    Asymptomatic menopausal state        Relevant Orders    XR DEXA bone density    Encounter for screening mammogram for breast cancer        Relevant Orders    BI mammo bilateral screening tomosynthesis        .os

## 2023-08-09 NOTE — PROGRESS NOTES
"Subjective   Patient ID: Mayela Otoole is a 88 y.o. female who presents for Medicare Annual Wellness Visit Subsequent.    HPI   Patient here for multiple issues.  She states she has not been seeing physicians for the past 1 year.  She recently started to complain of some mouth pain following jaw pain.  She describes gum issues.  She does have upper denture with some troublesome teeth lower down.  Inflammation of the gums wonders about vitamins if develop any benefit for this problem.    Also here for follow-up of depression.  She remains on her medications.  She denies increasing negative thoughts is excited about life continues to have enjoyed does take antidepressant and seems to help with medication with less anxiety despite caring for  Chris to he had some progressive aging issues.    Also here to follow-up on coronary artery disease.  Status post CABG surgery..Also here to follow-up on coronary artery disease.  Status post CABG surgery.  She remains on blood pressure and cholesterol meds.  She is compliant with those meds.  Review of Systems  cardiovascular:  no  palpitations or chest  pain  respiratory: no  shortness  of  breath  endocrine:  no polydipsia,  no polyuria  musculoskeletal:  no  myalgia..yes  arthralgia  All other  systems discussed  negative   Objective   /76   Pulse 82   Temp 36.1 °C (97 °F)   Resp 18   Ht 1.562 m (5' 1.5\")   Wt 66.7 kg (147 lb)   SpO2 95%   BMI 27.33 kg/m²     Physical Exam  Vitals: I have reviewed the vitals  General: Well-developed.  In no acute distress.  Eyes:   sclera nonicteric.  Conjunctiva not injected.  No discharge.   HEAD: Normocephalic, atraumatic.  HEENT   Mucous membranes moist.  Posterior oropharynx nonerythematous, no tonsillar exudates.   Dental raquel noted left lower incisor with tooth with absence of crown.  Lip with tiny ulceration noted left lower  No cervical lymphadenopathy.  Cardio: Regular rate and rhythm.  No murmur, rub or " gallop.  Pulmonary: Lungs clear to auscultation in all fields.  No accessory muscle use.  GI/: Normal active bowel sounds.  Soft, nontender.  No masses or organomegaly appreciated.  Musculoskeletal: No gross deformities appreciated.  Neuro: Alert, age-appropriate.  Normal muscle tone.  Moving all extremities.  Skin: No rash, bruises or lesions.   Assessment/Plan   Problem List Items Addressed This Visit       Dental caries     Please consider appointment with dentist and will give you phone number of my dentist on [address] Doctor sfeir         Relevant Medications    chlorhexidine (Peridex) 0.12 % solution    clindamycin (Cleocin) 300 mg capsule    Depression     Currently seems stable with dosage of antidepressant continue meds         Relevant Medications    sertraline (Zoloft) 50 mg tablet    Fatigue    Relevant Orders    CBC and Auto Differential    Hyperlipidemia     Cholesterol reviewed dated 9/22 with cholesterol 140 HDL 48 LDL was 72 triglycerides 118 Numbers excellent we will repeat labs and please do fasting stable check labs to ensure stability         Relevant Medications    atorvastatin (Lipitor) 20 mg tablet    Other Relevant Orders    Lipid Panel    Hypertension    Relevant Medications    amLODIPine (Norvasc) 5 mg tablet    Other Relevant Orders    Comprehensive Metabolic Panel    Benign essential hypertension     Blood pressure appears stable continue to check periodically if able 1 continue carvedilol and amlodipine.  Stable at this time check more frequently          Other Visit Diagnoses       Routine general medical examination at health care facility    -  Primary    Vitamin B 12 deficiency        Vitamin D deficiency        Relevant Orders    Vitamin D, Total    Diabetes mellitus screening        Relevant Orders    POCT glycosylated hemoglobin (Hb A1C) manually resulted    Need for vaccination        Relevant Medications    zoster vaccine-recombinant adjuvanted (Shingrix) 50 mcg/0.5 mL  vaccine    Other Relevant Orders    Pneumococcal conjugate vaccine 20-valent IM    Asymptomatic menopausal state        Relevant Orders    XR DEXA bone density    Encounter for screening mammogram for breast cancer        Relevant Orders    BI mammo bilateral screening tomosynthesis

## 2023-08-09 NOTE — ASSESSMENT & PLAN NOTE
Blood pressure appears stable continue to check periodically if able 1 continue carvedilol and amlodipine.  Stable at this time check more frequently

## 2023-08-10 ENCOUNTER — TELEPHONE (OUTPATIENT)
Dept: PRIMARY CARE | Facility: CLINIC | Age: 88
End: 2023-08-10
Payer: MEDICARE

## 2023-08-10 NOTE — TELEPHONE ENCOUNTER
Patient forgot the name of the dentist on [facility]. That you gave her yesterday to schedule with.     Can you recall dentist name?     Please advise, Thanks.

## 2023-09-07 ENCOUNTER — PATIENT OUTREACH (OUTPATIENT)
Dept: PRIMARY CARE | Facility: CLINIC | Age: 88
End: 2023-09-07
Payer: MEDICARE

## 2023-09-07 DIAGNOSIS — G45.9 TIA (TRANSIENT ISCHEMIC ATTACK): ICD-10-CM

## 2023-09-07 DIAGNOSIS — I10 HYPERTENSION, UNSPECIFIED TYPE: ICD-10-CM

## 2023-09-07 PROCEDURE — 99490 CHRNC CARE MGMT STAFF 1ST 20: CPT | Performed by: FAMILY MEDICINE
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
## 2023-09-28 DIAGNOSIS — E78.5 HYPERLIPIDEMIA, UNSPECIFIED HYPERLIPIDEMIA TYPE: ICD-10-CM

## 2023-09-28 RX ORDER — ATORVASTATIN CALCIUM 20 MG/1
20 TABLET, FILM COATED ORAL DAILY
Qty: 90 TABLET | Refills: 3 | Status: SHIPPED | OUTPATIENT
Start: 2023-09-28 | End: 2024-02-21 | Stop reason: SDUPTHER

## 2023-10-06 ENCOUNTER — DOCUMENTATION (OUTPATIENT)
Dept: PRIMARY CARE | Facility: CLINIC | Age: 88
End: 2023-10-06
Payer: MEDICARE

## 2023-10-18 ENCOUNTER — PATIENT OUTREACH (OUTPATIENT)
Dept: PRIMARY CARE | Facility: CLINIC | Age: 88
End: 2023-10-18
Payer: MEDICARE

## 2023-11-03 ENCOUNTER — PATIENT OUTREACH (OUTPATIENT)
Dept: PRIMARY CARE | Facility: CLINIC | Age: 88
End: 2023-11-03
Payer: MEDICARE

## 2023-11-08 DIAGNOSIS — F32.A DEPRESSION, UNSPECIFIED DEPRESSION TYPE: ICD-10-CM

## 2023-11-08 DIAGNOSIS — I10 HYPERTENSION, UNSPECIFIED TYPE: ICD-10-CM

## 2023-11-09 RX ORDER — SERTRALINE HYDROCHLORIDE 50 MG/1
50 TABLET, FILM COATED ORAL DAILY
Qty: 90 TABLET | Refills: 1 | Status: SHIPPED | OUTPATIENT
Start: 2023-11-09 | End: 2024-03-19 | Stop reason: SDUPTHER

## 2023-11-09 RX ORDER — CARVEDILOL 6.25 MG/1
6.25 TABLET ORAL 2 TIMES DAILY
Qty: 30 TABLET | Refills: 6 | Status: SHIPPED | OUTPATIENT
Start: 2023-11-09 | End: 2023-12-12 | Stop reason: SDUPTHER

## 2023-11-09 NOTE — TELEPHONE ENCOUNTER
Please schedule an appointment for review   of all med problems ...have sent in 90-day supply.  In the event that you cannot get in before this 90-day supply please call my office.Thanks much Dr. Sandy.

## 2023-11-14 ENCOUNTER — OFFICE VISIT (OUTPATIENT)
Dept: PRIMARY CARE | Facility: CLINIC | Age: 88
End: 2023-11-14
Payer: MEDICARE

## 2023-11-14 VITALS
WEIGHT: 145 LBS | DIASTOLIC BLOOD PRESSURE: 78 MMHG | HEIGHT: 62 IN | SYSTOLIC BLOOD PRESSURE: 136 MMHG | TEMPERATURE: 97 F | BODY MASS INDEX: 26.68 KG/M2 | HEART RATE: 82 BPM | RESPIRATION RATE: 18 BRPM | OXYGEN SATURATION: 93 %

## 2023-11-14 DIAGNOSIS — F32.A DEPRESSION, UNSPECIFIED DEPRESSION TYPE: ICD-10-CM

## 2023-11-14 DIAGNOSIS — R42 DIZZINESS: ICD-10-CM

## 2023-11-14 DIAGNOSIS — R10.11 RIGHT UPPER QUADRANT ABDOMINAL PAIN: ICD-10-CM

## 2023-11-14 DIAGNOSIS — R35.0 URINARY FREQUENCY: Primary | ICD-10-CM

## 2023-11-14 DIAGNOSIS — K64.9 HEMORRHOIDS, UNSPECIFIED HEMORRHOID TYPE: ICD-10-CM

## 2023-11-14 DIAGNOSIS — I15.9 SECONDARY HYPERTENSION: ICD-10-CM

## 2023-11-14 LAB
BACTERIA #/AREA URNS AUTO: ABNORMAL /HPF
HYALINE CASTS #/AREA URNS AUTO: ABNORMAL /LPF
MUCOUS THREADS #/AREA URNS AUTO: ABNORMAL /LPF
POC APPEARANCE, URINE: CLEAR
POC BILIRUBIN, URINE: NEGATIVE
POC BLOOD, URINE: NEGATIVE
POC COLOR, URINE: YELLOW
POC GLUCOSE, URINE: NEGATIVE MG/DL
POC KETONES, URINE: ABNORMAL MG/DL
POC LEUKOCYTES, URINE: ABNORMAL
POC NITRITE,URINE: NEGATIVE
POC PH, URINE: 5.5 PH
POC PROTEIN, URINE: ABNORMAL MG/DL
POC SPECIFIC GRAVITY, URINE: 1.02
POC UROBILINOGEN, URINE: 0.2 EU/DL
RBC #/AREA URNS AUTO: ABNORMAL /HPF
SQUAMOUS #/AREA URNS AUTO: ABNORMAL /HPF
WBC #/AREA URNS AUTO: ABNORMAL /HPF

## 2023-11-14 PROCEDURE — 1160F RVW MEDS BY RX/DR IN RCRD: CPT | Performed by: FAMILY MEDICINE

## 2023-11-14 PROCEDURE — 3075F SYST BP GE 130 - 139MM HG: CPT | Performed by: FAMILY MEDICINE

## 2023-11-14 PROCEDURE — 90662 IIV NO PRSV INCREASED AG IM: CPT | Performed by: FAMILY MEDICINE

## 2023-11-14 PROCEDURE — 99214 OFFICE O/P EST MOD 30 MIN: CPT | Performed by: FAMILY MEDICINE

## 2023-11-14 PROCEDURE — 87086 URINE CULTURE/COLONY COUNT: CPT

## 2023-11-14 PROCEDURE — 1159F MED LIST DOCD IN RCRD: CPT | Performed by: FAMILY MEDICINE

## 2023-11-14 PROCEDURE — G0008 ADMIN INFLUENZA VIRUS VAC: HCPCS | Performed by: FAMILY MEDICINE

## 2023-11-14 PROCEDURE — 81001 URINALYSIS AUTO W/SCOPE: CPT

## 2023-11-14 PROCEDURE — 81003 URINALYSIS AUTO W/O SCOPE: CPT | Performed by: FAMILY MEDICINE

## 2023-11-14 PROCEDURE — 3078F DIAST BP <80 MM HG: CPT | Performed by: FAMILY MEDICINE

## 2023-11-14 PROCEDURE — 1036F TOBACCO NON-USER: CPT | Performed by: FAMILY MEDICINE

## 2023-11-14 RX ORDER — HYDROCORTISONE 25 MG/G
CREAM TOPICAL 4 TIMES DAILY PRN
Qty: 30 G | Refills: 0 | Status: SHIPPED | OUTPATIENT
Start: 2023-11-14 | End: 2024-11-13

## 2023-11-14 RX ORDER — SUCRALFATE 1 G/1
1 TABLET ORAL
Qty: 60 TABLET | Refills: 11 | Status: SHIPPED | OUTPATIENT
Start: 2023-11-14 | End: 2024-11-13

## 2023-11-14 ASSESSMENT — ENCOUNTER SYMPTOMS
SHORTNESS OF BREATH: 0
BLOOD IN STOOL: 1
CHILLS: 0
DYSURIA: 0
WHEEZING: 0
FREQUENCY: 1
HEADACHES: 0
ARTHRALGIAS: 1
LIGHT-HEADEDNESS: 1
MYALGIAS: 0
NAUSEA: 0
POLYDIPSIA: 0
FEVER: 0
DIARRHEA: 1
RHINORRHEA: 0
COUGH: 0
ABDOMINAL PAIN: 1
CONSTIPATION: 0
DIFFICULTY URINATING: 0
UNEXPECTED WEIGHT CHANGE: 0
FATIGUE: 0
NERVOUS/ANXIOUS: 0
VOMITING: 0
EYE DISCHARGE: 0
PALPITATIONS: 0
DIZZINESS: 1
NUMBNESS: 0

## 2023-11-14 NOTE — ASSESSMENT & PLAN NOTE
1 month hx of increased urinary frequency up to 6x/night; does not occur during the day; is not associated with incontinence, dysuria, burning    Urine culture obtained during today's visit

## 2023-11-14 NOTE — ASSESSMENT & PLAN NOTE
Dizziness was associated with starting use of sertraline medication. Patient advised to cut 50mg tab in half and continue with a 25mg dose per day. Patient advised to let  Know if medication is not treating depression after reduced dosage or if dizziness continues

## 2023-11-14 NOTE — PROGRESS NOTES
Subjective   Patient ID: Mayela Otoole is a 88 y.o. female who presents for medication review.  HPI  Dizziness/Lightheadedness: 1 year hx of dizziness/lightheadedness that coincides with starting usage of sertraline medication. She has not fallen, but loses balance 3x/day. States that the sertraline is working to treat depression and that her mood is improved    Hemorrhoids: received ointment to treat hemorrhoids two weeks prior and states the treatment is working. Prior to treatment, patient noted bright red blood in her stool, however she has not noticed any since. Requested more anusol ointment    RUQ abdominal pain: 10 yr hx of RUQ pain that increases with pressure over the area.    Increased urinary frequency: 1 month hx of increased urinary frequency up to 6x/night; does not occur during the day; is not associated with incontinence, dysuria, burning      Review of Systems   Constitutional:  Negative for chills, fatigue, fever and unexpected weight change.   HENT:  Negative for ear discharge, ear pain and rhinorrhea.    Eyes:  Negative for discharge and visual disturbance.   Respiratory:  Negative for cough, shortness of breath and wheezing.    Cardiovascular:  Negative for chest pain and palpitations.   Gastrointestinal:  Positive for abdominal pain, blood in stool and diarrhea. Negative for constipation, nausea and vomiting.        RUQ pain with pressure over area    Blood in stool prior to hemorrhoid tx; resolved   Endocrine: Positive for polyuria. Negative for cold intolerance, heat intolerance and polydipsia.   Genitourinary:  Positive for frequency. Negative for difficulty urinating and dysuria.        Increased frequency to 6x/night; does not occur during day   Musculoskeletal:  Positive for arthralgias. Negative for myalgias.   Neurological:  Positive for dizziness and light-headedness. Negative for syncope, numbness and headaches.   Psychiatric/Behavioral:  The patient is not nervous/anxious.     All other systems reviewed and are negative.      Objective   Physical Exam  Vitals: I have reviewed the vitals  General: Well-developed.  In no acute distress.  Eyes:   sclera nonicteric.  Conjunctiva not injected.  No discharge.   HEAD: Normocephalic, atraumatic.  HEENT   Mucous membranes moist.  Posterior oropharynx nonerythematous, no tonsillar exudates.      No cervical lymphadenopathy.  Cardio: Regular rate and rhythm.  No murmur, rub or gallop.  Pulmonary: Lungs clear to auscultation in all fields.  No accessory muscle use.  GI/: Normal active bowel sounds.  Soft, RUQ tender to palpation.  No masses or organomegaly appreciated.  Musculoskeletal: No gross deformities appreciated.  Neuro: Alert, age-appropriate.  Normal muscle tone.  Moving all extremities.  Skin: No rash, bruises or lesions.    Labs  Last 12 months   Office Visit on 08/09/2023   Component Date Value Ref Range Status    POC HEMOGLOBIN A1c 08/09/2023 5.7  4.2 - 6.5 % Final         Assessment/Plan   Problem List Items Addressed This Visit       Depression     Dizziness was associated with starting use of sertraline medication. Patient advised to cut 50mg tab in half and continue with a 25mg dose per day. Patient advised to let  Know if medication is not treating depression after reduced dosage or if dizziness continues         Hemorrhoid     Prescribed anusol ointment to continue treatment         Relevant Medications    hydrocortisone (Anusol-HC) 2.5 % rectal cream    Hypertension    Relevant Orders    CBC and Auto Differential    Comprehensive Metabolic Panel    Lipid Panel    Right upper quadrant abdominal pain     Prescribed carafate tablets to treat.         Relevant Medications    sucralfate (Carafate) 1 gram tablet    Dizziness - Primary     1 year hx of dizziness/lightheadedness that coincides with starting usage of sertraline medication. She has not fallen, but loses balance 3x/day. States that the sertraline is working to treat  depression and that her mood is improved    Patient advised to cut 50mg sertraline tabs in half and continue w/ 25mg dose. Carotid artery duplex US ordered         Relevant Orders    Vascular US Carotid Artery Duplex Bilateral    Urinary frequency     1 month hx of increased urinary frequency up to 6x/night; does not occur during the day; is not associated with incontinence, dysuria, burning    Urine culture obtained during today's visit         Relevant Orders    Urine Culture

## 2023-11-14 NOTE — ASSESSMENT & PLAN NOTE
1 year hx of dizziness/lightheadedness that coincides with starting usage of sertraline medication. She has not fallen, but loses balance 3x/day. States that the sertraline is working to treat depression and that her mood is improved    Patient advised to cut 50mg sertraline tabs in half and continue w/ 25mg dose. Carotid artery duplex US ordered

## 2023-11-17 ENCOUNTER — PATIENT OUTREACH (OUTPATIENT)
Dept: PRIMARY CARE | Facility: CLINIC | Age: 88
End: 2023-11-17
Payer: MEDICARE

## 2023-11-17 DIAGNOSIS — I10 HYPERTENSION, UNSPECIFIED TYPE: ICD-10-CM

## 2023-11-17 DIAGNOSIS — G45.9 TIA (TRANSIENT ISCHEMIC ATTACK): ICD-10-CM

## 2023-11-17 LAB — BACTERIA UR CULT: NORMAL

## 2023-11-17 PROCEDURE — 99490 CHRNC CARE MGMT STAFF 1ST 20: CPT | Performed by: FAMILY MEDICINE

## 2023-11-17 NOTE — PROGRESS NOTES
"Chart reviewed and outreach call placed.  I spoke with Mayela, she was in the office to see Dr. Sandy on 3 days ago.  I had the pleasure of meeting her in person very briefly.  Her BP at the time was 136/78.  She complained of dizziness since starting sertraline one year prior. She has also been experiencing loss of balance.  Dr. Sandy decreased dose at this appt.  Pt reports that she is feeling \"a little bit better\".  She was planning to get US of Carotid arteries today but did not want to go out d/t weather.  She is planning to go next week some time.    "

## 2023-11-22 ENCOUNTER — TELEPHONE (OUTPATIENT)
Dept: PRIMARY CARE | Facility: CLINIC | Age: 88
End: 2023-11-22
Payer: MEDICARE

## 2023-11-22 NOTE — TELEPHONE ENCOUNTER
----- Message from Marvin Sandy DO sent at 11/21/2023  7:50 PM EST -----  Nmeeds phone follow  up to discuss lab testing   soon

## 2023-12-11 DIAGNOSIS — R35.0 URINARY FREQUENCY: ICD-10-CM

## 2023-12-11 DIAGNOSIS — R42 DIZZINESS: Primary | ICD-10-CM

## 2023-12-11 RX ORDER — SULFAMETHOXAZOLE AND TRIMETHOPRIM 800; 160 MG/1; MG/1
1 TABLET ORAL 2 TIMES DAILY
Qty: 14 TABLET | Refills: 0 | Status: SHIPPED | OUTPATIENT
Start: 2023-12-11 | End: 2023-12-18

## 2023-12-12 ENCOUNTER — TELEPHONE (OUTPATIENT)
Dept: PRIMARY CARE | Facility: CLINIC | Age: 88
End: 2023-12-12
Payer: MEDICARE

## 2023-12-12 DIAGNOSIS — I10 HYPERTENSION, UNSPECIFIED TYPE: ICD-10-CM

## 2023-12-12 RX ORDER — CARVEDILOL 6.25 MG/1
6.25 TABLET ORAL 2 TIMES DAILY
Qty: 30 TABLET | Refills: 6 | Status: SHIPPED | OUTPATIENT
Start: 2023-12-12 | End: 2024-06-05 | Stop reason: SDUPTHER

## 2023-12-12 NOTE — TELEPHONE ENCOUNTER
Pt needs a refill on Carvedilol 6.25mg  Also pt is wondering if she should still be taking Sucralfate 1gram tablet for her stomach?    MA please assist and send med refill to Dr. Sandy to sign off, thanks!      Dr. Sandy please advise if patient should continue taking stomach medication, thanks!

## 2023-12-12 NOTE — PROGRESS NOTES
Urine showed urinary tract infection did she get antibiotics and if so was not sulfa.  If not have signs of prescription to pharmacy.  Make sure she is not allergic.  Otherwise find out if she had antibiotic treatment for UTI last month and let me know

## 2023-12-15 ENCOUNTER — PATIENT OUTREACH (OUTPATIENT)
Dept: PRIMARY CARE | Facility: CLINIC | Age: 88
End: 2023-12-15
Payer: MEDICARE

## 2023-12-15 DIAGNOSIS — I10 HYPERTENSION, UNSPECIFIED TYPE: ICD-10-CM

## 2023-12-15 DIAGNOSIS — G45.9 TIA (TRANSIENT ISCHEMIC ATTACK): ICD-10-CM

## 2023-12-15 NOTE — PROGRESS NOTES
[facility] outreach call attempt #1.  Message left with reason for call and my contact information.

## 2023-12-29 ENCOUNTER — PATIENT OUTREACH (OUTPATIENT)
Dept: PRIMARY CARE | Facility: CLINIC | Age: 88
End: 2023-12-29
Payer: MEDICARE

## 2023-12-29 DIAGNOSIS — I10 HYPERTENSION, UNSPECIFIED TYPE: ICD-10-CM

## 2023-12-29 DIAGNOSIS — G45.9 TIA (TRANSIENT ISCHEMIC ATTACK): ICD-10-CM

## 2024-01-10 ENCOUNTER — APPOINTMENT (OUTPATIENT)
Dept: PRIMARY CARE | Facility: CLINIC | Age: 89
End: 2024-01-10
Payer: MEDICARE

## 2024-01-18 ENCOUNTER — PATIENT OUTREACH (OUTPATIENT)
Dept: PRIMARY CARE | Facility: CLINIC | Age: 89
End: 2024-01-18
Payer: MEDICARE

## 2024-01-18 DIAGNOSIS — G45.9 TIA (TRANSIENT ISCHEMIC ATTACK): ICD-10-CM

## 2024-01-18 DIAGNOSIS — I10 HYPERTENSION, UNSPECIFIED TYPE: ICD-10-CM

## 2024-01-18 PROCEDURE — 99490 CHRNC CARE MGMT STAFF 1ST 20: CPT | Performed by: FAMILY MEDICINE

## 2024-01-18 NOTE — PROGRESS NOTES
Chart reviewed and [facility] monthly outreach call placed.  Mayela is in very great spirits and getting around the house without issue.  She has been cooking for herself and .  She has family and friends that help with groceries. She is compliant with medications and BP this am was 120. She denies need for Rx or appt at this time.

## 2024-02-12 ENCOUNTER — APPOINTMENT (OUTPATIENT)
Dept: PRIMARY CARE | Facility: CLINIC | Age: 89
End: 2024-02-12
Payer: MEDICARE

## 2024-02-13 ENCOUNTER — PATIENT OUTREACH (OUTPATIENT)
Dept: PRIMARY CARE | Facility: CLINIC | Age: 89
End: 2024-02-13
Payer: MEDICARE

## 2024-02-13 DIAGNOSIS — G45.9 TIA (TRANSIENT ISCHEMIC ATTACK): ICD-10-CM

## 2024-02-13 DIAGNOSIS — I10 HYPERTENSION, UNSPECIFIED TYPE: ICD-10-CM

## 2024-02-21 ENCOUNTER — TELEPHONE (OUTPATIENT)
Dept: PRIMARY CARE | Facility: CLINIC | Age: 89
End: 2024-02-21
Payer: MEDICARE

## 2024-02-21 DIAGNOSIS — E78.5 HYPERLIPIDEMIA, UNSPECIFIED HYPERLIPIDEMIA TYPE: ICD-10-CM

## 2024-02-21 DIAGNOSIS — I10 HYPERTENSION, UNSPECIFIED TYPE: ICD-10-CM

## 2024-02-21 RX ORDER — ATORVASTATIN CALCIUM 20 MG/1
20 TABLET, FILM COATED ORAL DAILY
Qty: 90 TABLET | Refills: 1 | Status: SHIPPED | OUTPATIENT
Start: 2024-02-21 | End: 2024-03-19 | Stop reason: SDUPTHER

## 2024-02-21 NOTE — TELEPHONE ENCOUNTER
Rx Refill Request Telephone Encounter    Name:  Mayela Otoole  :  [id number]  Medication Name:  atorvastatin  Dose : 20mg  Route : oral  Frequency : daily  Specific Pharmacy location:  [facility] [place]

## 2024-02-26 PROBLEM — F33.9 DEPRESSION, RECURRENT (CMS-HCC): Status: ACTIVE | Noted: 2023-01-23

## 2024-02-27 ENCOUNTER — TELEPHONE (OUTPATIENT)
Dept: PRIMARY CARE | Facility: CLINIC | Age: 89
End: 2024-02-27
Payer: MEDICARE

## 2024-02-27 ENCOUNTER — PATIENT OUTREACH (OUTPATIENT)
Dept: PRIMARY CARE | Facility: CLINIC | Age: 89
End: 2024-02-27
Payer: MEDICARE

## 2024-02-27 NOTE — TELEPHONE ENCOUNTER
Patients daughter called and states [facility] only has the cough drops that were called in last night  They don't have the Molnupiravir.    Can you call and verify

## 2024-03-07 ENCOUNTER — PATIENT OUTREACH (OUTPATIENT)
Dept: PRIMARY CARE | Facility: CLINIC | Age: 89
End: 2024-03-07
Payer: MEDICARE

## 2024-03-19 ENCOUNTER — PATIENT OUTREACH (OUTPATIENT)
Dept: PRIMARY CARE | Facility: CLINIC | Age: 89
End: 2024-03-19
Payer: MEDICARE

## 2024-03-19 DIAGNOSIS — E78.5 HYPERLIPIDEMIA, UNSPECIFIED HYPERLIPIDEMIA TYPE: ICD-10-CM

## 2024-03-19 DIAGNOSIS — F32.A DEPRESSION, UNSPECIFIED DEPRESSION TYPE: ICD-10-CM

## 2024-03-19 DIAGNOSIS — G45.9 TIA (TRANSIENT ISCHEMIC ATTACK): ICD-10-CM

## 2024-03-19 DIAGNOSIS — I10 HYPERTENSION, UNSPECIFIED TYPE: ICD-10-CM

## 2024-03-19 PROCEDURE — 99490 CHRNC CARE MGMT STAFF 1ST 20: CPT | Performed by: FAMILY MEDICINE

## 2024-03-19 RX ORDER — SERTRALINE HYDROCHLORIDE 50 MG/1
50 TABLET, FILM COATED ORAL DAILY
Qty: 90 TABLET | Refills: 1 | Status: SHIPPED | OUTPATIENT
Start: 2024-03-19 | End: 2025-03-19

## 2024-03-19 RX ORDER — ATORVASTATIN CALCIUM 20 MG/1
20 TABLET, FILM COATED ORAL DAILY
Qty: 90 TABLET | Refills: 1 | Status: SHIPPED | OUTPATIENT
Start: 2024-03-19 | End: 2024-06-05 | Stop reason: SDUPTHER

## 2024-03-19 NOTE — PROGRESS NOTES
Chart reviewed and [facility] monthly outreach call placed. I spoke to Mayela and she reports that she is doing 6/10.  She, her , and daughter have recently recovered from covid.  They had been Isolating.  She suffers from arthritis pain. Denies chest pain, falls, SOB.  She did request refills on medications and message sent to Dr. Sandy.

## 2024-04-18 ENCOUNTER — PATIENT OUTREACH (OUTPATIENT)
Dept: PRIMARY CARE | Facility: CLINIC | Age: 89
End: 2024-04-18
Payer: MEDICARE

## 2024-04-18 DIAGNOSIS — G45.9 TIA (TRANSIENT ISCHEMIC ATTACK): ICD-10-CM

## 2024-04-18 DIAGNOSIS — I10 HYPERTENSION, UNSPECIFIED TYPE: ICD-10-CM

## 2024-04-25 ENCOUNTER — PATIENT OUTREACH (OUTPATIENT)
Dept: PRIMARY CARE | Facility: CLINIC | Age: 89
End: 2024-04-25
Payer: MEDICARE

## 2024-04-25 DIAGNOSIS — G45.9 TIA (TRANSIENT ISCHEMIC ATTACK): ICD-10-CM

## 2024-04-25 DIAGNOSIS — I10 HYPERTENSION, UNSPECIFIED TYPE: ICD-10-CM

## 2024-04-25 PROCEDURE — 99490 CHRNC CARE MGMT STAFF 1ST 20: CPT | Performed by: FAMILY MEDICINE

## 2024-04-25 NOTE — PROGRESS NOTES
Chart reviewed and [facility] monthly outreach call placed.  I spoke with Mayela and she reports that she is doing okay.  She continues to have sciatica pain and takes tylenol per Dr. Sandy's recommendations.    She denies falls, chest pain, SOB.  At this time she has no further needs but asked that I continue to call monthly to check in on her.

## 2024-05-29 ENCOUNTER — PATIENT OUTREACH (OUTPATIENT)
Dept: PRIMARY CARE | Facility: CLINIC | Age: 89
End: 2024-05-29
Payer: MEDICARE

## 2024-06-05 DIAGNOSIS — E78.5 HYPERLIPIDEMIA, UNSPECIFIED HYPERLIPIDEMIA TYPE: ICD-10-CM

## 2024-06-05 DIAGNOSIS — I10 HYPERTENSION, UNSPECIFIED TYPE: ICD-10-CM

## 2024-06-05 RX ORDER — ATORVASTATIN CALCIUM 20 MG/1
20 TABLET, FILM COATED ORAL DAILY
Qty: 90 TABLET | Refills: 1 | Status: SHIPPED | OUTPATIENT
Start: 2024-06-05 | End: 2025-06-05

## 2024-06-05 RX ORDER — CARVEDILOL 6.25 MG/1
6.25 TABLET ORAL 2 TIMES DAILY
Qty: 30 TABLET | Refills: 6 | Status: SHIPPED | OUTPATIENT
Start: 2024-06-05

## 2024-06-12 ENCOUNTER — PATIENT OUTREACH (OUTPATIENT)
Dept: PRIMARY CARE | Facility: CLINIC | Age: 89
End: 2024-06-12
Payer: MEDICARE

## 2024-06-21 ENCOUNTER — PATIENT OUTREACH (OUTPATIENT)
Dept: PRIMARY CARE | Facility: CLINIC | Age: 89
End: 2024-06-21
Payer: MEDICARE

## 2024-06-21 DIAGNOSIS — G45.9 TIA (TRANSIENT ISCHEMIC ATTACK): ICD-10-CM

## 2024-06-21 DIAGNOSIS — I10 HYPERTENSION, UNSPECIFIED TYPE: ICD-10-CM

## 2024-06-21 NOTE — PROGRESS NOTES
Chart reviewed and [facility] monthly outreach call placed.  I spoke with Mayela and she reports that she is doing good.  She still has pain in her legs and gets very tired with activity.  She denies falls, chest pain, SOB.  She reports that she does feel a bit dizzy when she does not eat but quickly resolves with intake.    She is scheduled with Dr. Sandy in August.  Denies need for Rx refills at this time.

## 2024-07-08 DIAGNOSIS — I10 HYPERTENSION, UNSPECIFIED TYPE: ICD-10-CM

## 2024-07-08 DIAGNOSIS — E78.5 HYPERLIPIDEMIA, UNSPECIFIED HYPERLIPIDEMIA TYPE: ICD-10-CM

## 2024-07-10 RX ORDER — CARVEDILOL 6.25 MG/1
6.25 TABLET ORAL 2 TIMES DAILY
Qty: 180 TABLET | Refills: 0 | Status: SHIPPED | OUTPATIENT
Start: 2024-07-10

## 2024-07-10 RX ORDER — ATORVASTATIN CALCIUM 20 MG/1
20 TABLET, FILM COATED ORAL DAILY
Qty: 90 TABLET | Refills: 0 | Status: SHIPPED | OUTPATIENT
Start: 2024-07-10

## 2024-07-10 NOTE — TELEPHONE ENCOUNTER
Please schedule an appointment for review   of all med problems ...have sent in 90-day supply.  In the event that you cannot get in before this 90-day supply please call my office.Thanks much Dr. Sandy.   12-May-2022 21:05

## 2024-07-17 ENCOUNTER — PATIENT OUTREACH (OUTPATIENT)
Dept: PRIMARY CARE | Facility: CLINIC | Age: 89
End: 2024-07-17
Payer: MEDICARE

## 2024-07-26 ENCOUNTER — PATIENT OUTREACH (OUTPATIENT)
Dept: PRIMARY CARE | Facility: CLINIC | Age: 89
End: 2024-07-26
Payer: MEDICARE

## 2024-07-26 DIAGNOSIS — G45.9 TIA (TRANSIENT ISCHEMIC ATTACK): ICD-10-CM

## 2024-07-26 DIAGNOSIS — I10 HYPERTENSION, UNSPECIFIED TYPE: ICD-10-CM

## 2024-08-12 ENCOUNTER — APPOINTMENT (OUTPATIENT)
Dept: PRIMARY CARE | Facility: CLINIC | Age: 89
End: 2024-08-12
Payer: MEDICARE

## 2024-08-22 ENCOUNTER — PATIENT OUTREACH (OUTPATIENT)
Dept: PRIMARY CARE | Facility: CLINIC | Age: 89
End: 2024-08-22
Payer: MEDICARE

## 2024-08-22 DIAGNOSIS — G45.9 TIA (TRANSIENT ISCHEMIC ATTACK): ICD-10-CM

## 2024-08-22 DIAGNOSIS — I10 HYPERTENSION, UNSPECIFIED TYPE: ICD-10-CM

## 2024-08-26 ENCOUNTER — PATIENT OUTREACH (OUTPATIENT)
Dept: PRIMARY CARE | Facility: CLINIC | Age: 89
End: 2024-08-26
Payer: MEDICARE

## 2024-08-26 DIAGNOSIS — G45.9 TIA (TRANSIENT ISCHEMIC ATTACK): ICD-10-CM

## 2024-08-26 DIAGNOSIS — I10 HYPERTENSION, UNSPECIFIED TYPE: ICD-10-CM

## 2024-08-26 PROCEDURE — 99490 CHRNC CARE MGMT STAFF 1ST 20: CPT | Performed by: FAMILY MEDICINE

## 2024-08-26 NOTE — PROGRESS NOTES
Chart reviewed and [facility] monthly outreach call placed.  I spoke with patients daughter., Davida.  She stated that Mayela is doing well and has no needs at this time.    I informed her that she missed her appt on 8/12/24 and the office has been trying to get a hold of her to reschedule.  She said she would pass on the message.

## 2024-09-05 ENCOUNTER — APPOINTMENT (OUTPATIENT)
Dept: PRIMARY CARE | Facility: CLINIC | Age: 89
End: 2024-09-05
Payer: MEDICARE

## 2024-09-21 DIAGNOSIS — I10 HYPERTENSION, UNSPECIFIED TYPE: ICD-10-CM

## 2024-09-23 ENCOUNTER — PATIENT OUTREACH (OUTPATIENT)
Dept: PRIMARY CARE | Facility: CLINIC | Age: 89
End: 2024-09-23
Payer: MEDICARE

## 2024-09-23 RX ORDER — CARVEDILOL 6.25 MG/1
6.25 TABLET ORAL 2 TIMES DAILY
Qty: 180 TABLET | Refills: 0 | Status: SHIPPED | OUTPATIENT
Start: 2024-09-23

## 2024-09-26 ENCOUNTER — PATIENT OUTREACH (OUTPATIENT)
Dept: PRIMARY CARE | Facility: CLINIC | Age: 89
End: 2024-09-26
Payer: MEDICARE

## 2024-09-26 DIAGNOSIS — G45.9 TIA (TRANSIENT ISCHEMIC ATTACK): ICD-10-CM

## 2024-09-26 DIAGNOSIS — I10 HYPERTENSION, UNSPECIFIED TYPE: ICD-10-CM

## 2024-09-26 NOTE — PROGRESS NOTES
Chart reviewed and second attempt to reach patient for monthly care management outreach.  VM left with reason for call and my contact information.

## 2024-10-28 ENCOUNTER — PATIENT OUTREACH (OUTPATIENT)
Dept: PRIMARY CARE | Facility: CLINIC | Age: 89
End: 2024-10-28
Payer: MEDICARE

## 2024-10-28 DIAGNOSIS — I10 HYPERTENSION, UNSPECIFIED TYPE: ICD-10-CM

## 2024-10-28 DIAGNOSIS — G45.9 TIA (TRANSIENT ISCHEMIC ATTACK): ICD-10-CM

## 2024-10-31 ENCOUNTER — PATIENT OUTREACH (OUTPATIENT)
Dept: PRIMARY CARE | Facility: CLINIC | Age: 89
End: 2024-10-31
Payer: MEDICARE

## 2024-10-31 DIAGNOSIS — G45.9 TIA (TRANSIENT ISCHEMIC ATTACK): ICD-10-CM

## 2024-10-31 DIAGNOSIS — I10 HYPERTENSION, UNSPECIFIED TYPE: ICD-10-CM

## 2024-10-31 PROCEDURE — 99490 CHRNC CARE MGMT STAFF 1ST 20: CPT | Performed by: FAMILY MEDICINE

## 2024-11-19 ENCOUNTER — PATIENT OUTREACH (OUTPATIENT)
Dept: PRIMARY CARE | Facility: CLINIC | Age: 89
End: 2024-11-19
Payer: MEDICARE

## 2024-11-19 DIAGNOSIS — I10 HYPERTENSION, UNSPECIFIED TYPE: ICD-10-CM

## 2024-11-19 DIAGNOSIS — G45.9 TIA (TRANSIENT ISCHEMIC ATTACK): ICD-10-CM

## 2024-11-25 ENCOUNTER — PATIENT OUTREACH (OUTPATIENT)
Dept: PRIMARY CARE | Facility: CLINIC | Age: 89
End: 2024-11-25
Payer: MEDICARE

## 2024-11-25 DIAGNOSIS — E78.5 HYPERLIPIDEMIA, UNSPECIFIED HYPERLIPIDEMIA TYPE: ICD-10-CM

## 2024-11-25 RX ORDER — ATORVASTATIN CALCIUM 20 MG/1
20 TABLET, FILM COATED ORAL DAILY
Qty: 90 TABLET | Refills: 0 | Status: SHIPPED | OUTPATIENT
Start: 2024-11-25

## 2024-12-02 ENCOUNTER — TELEPHONE (OUTPATIENT)
Dept: PRIMARY CARE | Facility: CLINIC | Age: 89
End: 2024-12-02
Payer: MEDICARE

## 2024-12-02 DIAGNOSIS — I10 HYPERTENSION, UNSPECIFIED TYPE: ICD-10-CM

## 2024-12-02 RX ORDER — CARVEDILOL 6.25 MG/1
6.25 TABLET ORAL 2 TIMES DAILY
Qty: 180 TABLET | Refills: 3 | Status: SHIPPED | OUTPATIENT
Start: 2024-12-02

## 2024-12-03 DIAGNOSIS — E78.5 HYPERLIPIDEMIA, UNSPECIFIED HYPERLIPIDEMIA TYPE: ICD-10-CM

## 2024-12-03 RX ORDER — ATORVASTATIN CALCIUM 20 MG/1
20 TABLET, FILM COATED ORAL DAILY
Qty: 90 TABLET | Refills: 0 | Status: SHIPPED | OUTPATIENT
Start: 2024-12-03

## 2024-12-03 NOTE — TELEPHONE ENCOUNTER
Patient is unable to come into the office at this time as she has been taking care of her  around the clock. She declined a visit at this time. Her  has been constantly in and out of the hospital. He is now in a nursing home. She is aware it has been a year since she has been seen; however she declined to schedule at the time. -MARCY

## 2024-12-09 ENCOUNTER — PATIENT OUTREACH (OUTPATIENT)
Dept: PRIMARY CARE | Facility: CLINIC | Age: 89
End: 2024-12-09
Payer: MEDICARE

## 2024-12-09 DIAGNOSIS — G45.9 TIA (TRANSIENT ISCHEMIC ATTACK): ICD-10-CM

## 2024-12-09 DIAGNOSIS — I10 HYPERTENSION, UNSPECIFIED TYPE: ICD-10-CM

## 2024-12-09 NOTE — PROGRESS NOTES
Chart reviewed and RN MARIA ISABEL monthly outreach call placed.  VM left with reason for call and contact information.

## 2024-12-30 ENCOUNTER — PATIENT OUTREACH (OUTPATIENT)
Dept: PRIMARY CARE | Facility: CLINIC | Age: 89
End: 2024-12-30
Payer: MEDICARE

## 2024-12-30 DIAGNOSIS — I10 HYPERTENSION, UNSPECIFIED TYPE: ICD-10-CM

## 2024-12-30 DIAGNOSIS — G45.9 TIA (TRANSIENT ISCHEMIC ATTACK): ICD-10-CM

## 2024-12-30 NOTE — PROGRESS NOTES
2nd attempt to contact patient for CCM monthly outreach.  VM left with reason for call and contact information.

## 2025-01-02 NOTE — TELEPHONE ENCOUNTER
Called pt 09/24/2024 @ 0857  N/A lvm with office number to call back to schedule an appointment   I would like your resting heart rate to be between 60 and 110 beats per minute.  If after a week of the higher dose of diltiazem it is still high let me know.

## 2025-01-13 ENCOUNTER — PATIENT OUTREACH (OUTPATIENT)
Dept: PRIMARY CARE | Facility: CLINIC | Age: 89
End: 2025-01-13
Payer: MEDICARE

## 2025-01-13 DIAGNOSIS — G45.9 TIA (TRANSIENT ISCHEMIC ATTACK): ICD-10-CM

## 2025-01-13 DIAGNOSIS — I10 HYPERTENSION, UNSPECIFIED TYPE: ICD-10-CM

## 2025-03-11 ENCOUNTER — HOSPITAL ENCOUNTER (EMERGENCY)
Age: 89
Discharge: HOME | End: 2025-03-11
Payer: COMMERCIAL

## 2025-03-11 VITALS
HEIGHT: 62 IN | BODY MASS INDEX: 23.92 KG/M2 | TEMPERATURE: 98.2 F | HEART RATE: 80 BPM | SYSTOLIC BLOOD PRESSURE: 140 MMHG | WEIGHT: 130 LBS | OXYGEN SATURATION: 97 % | RESPIRATION RATE: 16 BRPM | DIASTOLIC BLOOD PRESSURE: 65 MMHG

## 2025-03-11 DIAGNOSIS — T16.1XXA FOREIGN BODY OF RIGHT EAR, INITIAL ENCOUNTER: Primary | ICD-10-CM

## 2025-03-11 PROCEDURE — 6370000000 HC RX 637 (ALT 250 FOR IP)

## 2025-03-11 PROCEDURE — 99283 EMERGENCY DEPT VISIT LOW MDM: CPT

## 2025-03-11 RX ORDER — ACETAMINOPHEN 500 MG
1000 TABLET ORAL ONCE
Status: COMPLETED | OUTPATIENT
Start: 2025-03-11 | End: 2025-03-11

## 2025-03-11 RX ADMIN — ACETAMINOPHEN 1000 MG: 500 TABLET ORAL at 11:13

## 2025-03-11 ASSESSMENT — PAIN DESCRIPTION - DESCRIPTORS: DESCRIPTORS: ACHING

## 2025-03-11 ASSESSMENT — PAIN - FUNCTIONAL ASSESSMENT
PAIN_FUNCTIONAL_ASSESSMENT: ACTIVITIES ARE NOT PREVENTED
PAIN_FUNCTIONAL_ASSESSMENT: 0-10

## 2025-03-11 ASSESSMENT — PAIN DESCRIPTION - ORIENTATION
ORIENTATION: RIGHT
ORIENTATION: RIGHT

## 2025-03-11 ASSESSMENT — PAIN DESCRIPTION - LOCATION
LOCATION: EAR
LOCATION: EAR

## 2025-03-11 ASSESSMENT — PAIN SCALES - GENERAL: PAINLEVEL_OUTOF10: 4

## 2025-03-11 ASSESSMENT — PAIN DESCRIPTION - PAIN TYPE: TYPE: ACUTE PAIN

## 2025-03-11 NOTE — ED PROVIDER NOTES
[facility] EMERGENCY DEPARTMENT  EMERGENCY DEPARTMENT ENCOUNTER      Pt Name: Margarita Jones  MRN: [id number]  Birthdate 1935  Date of evaluation: 3/11/2025  Provider: RONAK Martinez  11:05 AM EDT    CHIEF COMPLAINT       Chief Complaint   Patient presents with    Foreign Body in Ear     Earring stuck in right earlobe since this morning         HISTORY OF PRESENT ILLNESS   (Location/Symptom, Timing/Onset, Context/Setting, Quality, Duration, Modifying Factors, Severity)  Note limiting factors.   Margarita Jones is a 89 y.o. female whom review has a PMHx of hypertension, CAD s/p CABG, anxiety, TIA, GERD, hyperlipidemia, IBS, osteoarthritis presents to ED for evaluation, removal of foreign body to R ear.  Patient reports that she put her earring in her R earlobe backwards and states that she has been unable to remove it.  No additional complaints verbalized.    HPI    Nursing Notes were reviewed.    REVIEW OF SYSTEMS    (2-9 systems for level 4, 10 or more for level 5)     Review of Systems   HENT:          Earring stuck in right earlobe   All other systems reviewed and are negative.      Except as noted above the remainder of the review of systems was reviewed and negative.       PAST MEDICAL HISTORY     Past Medical History:   Diagnosis Date    Anxiety     Cirrhosis (HCC)     Coronary artery disease involving native coronary artery of native heart without angina pectoris 1/8/2018    HTN (hypertension)          SURGICAL HISTORY       Past Surgical History:   Procedure Laterality Date    CORONARY ARTERY BYPASS GRAFT           CURRENT MEDICATIONS       Previous Medications    ACETAMINOPHEN (TYLENOL) 500 MG TABLET    Take 1,000 mg by mouth 2 times daily    AMLODIPINE (NORVASC) 5 MG TABLET    Take 1 tablet by mouth daily as needed (if systolic BP > 160)    ASPIRIN (ECOTRIN) 325 MG EC TABLET    Take 325 mg by mouth daily    ATORVASTATIN (LIPITOR) 20 MG TABLET    Take 20 mg by mouth nightly    CARVEDILOL

## 2025-03-11 NOTE — ED TRIAGE NOTES
A & Ox4. Skin pink warm and dry. Pt states she put her earring in her right ear this morning at 0600 and now she can't get it out. States no real pain unless you touch it. No bleeding noted.

## 2025-03-24 ENCOUNTER — APPOINTMENT (OUTPATIENT)
Dept: PRIMARY CARE | Facility: CLINIC | Age: 89
End: 2025-03-24
Payer: COMMERCIAL

## 2025-03-24 VITALS
OXYGEN SATURATION: 96 % | WEIGHT: 134.6 LBS | HEIGHT: 62 IN | SYSTOLIC BLOOD PRESSURE: 135 MMHG | TEMPERATURE: 97 F | HEART RATE: 93 BPM | RESPIRATION RATE: 18 BRPM | DIASTOLIC BLOOD PRESSURE: 76 MMHG | BODY MASS INDEX: 24.77 KG/M2

## 2025-03-24 DIAGNOSIS — Z13.1 DIABETES MELLITUS SCREENING: ICD-10-CM

## 2025-03-24 DIAGNOSIS — G44.329 CHRONIC POST-TRAUMATIC HEADACHE, NOT INTRACTABLE: ICD-10-CM

## 2025-03-24 DIAGNOSIS — I10 HYPERTENSION, UNSPECIFIED TYPE: ICD-10-CM

## 2025-03-24 DIAGNOSIS — Z78.0 ASYMPTOMATIC MENOPAUSAL STATE: ICD-10-CM

## 2025-03-24 DIAGNOSIS — R79.9 ABNORMAL FINDING OF BLOOD CHEMISTRY, UNSPECIFIED: ICD-10-CM

## 2025-03-24 DIAGNOSIS — I15.9 SECONDARY HYPERTENSION: ICD-10-CM

## 2025-03-24 DIAGNOSIS — I10 BENIGN ESSENTIAL HYPERTENSION: ICD-10-CM

## 2025-03-24 DIAGNOSIS — F33.9 DEPRESSION, RECURRENT (CMS-HCC): ICD-10-CM

## 2025-03-24 DIAGNOSIS — F32.A DEPRESSION, UNSPECIFIED DEPRESSION TYPE: ICD-10-CM

## 2025-03-24 DIAGNOSIS — Z23 NEED FOR VACCINATION: ICD-10-CM

## 2025-03-24 DIAGNOSIS — E78.5 HYPERLIPIDEMIA, UNSPECIFIED HYPERLIPIDEMIA TYPE: ICD-10-CM

## 2025-03-24 DIAGNOSIS — Z00.00 ROUTINE GENERAL MEDICAL EXAMINATION AT HEALTH CARE FACILITY: Primary | ICD-10-CM

## 2025-03-24 LAB — POC HEMOGLOBIN A1C: 5.5 % (ref 4.2–6.5)

## 2025-03-24 PROCEDURE — 99214 OFFICE O/P EST MOD 30 MIN: CPT | Performed by: FAMILY MEDICINE

## 2025-03-24 PROCEDURE — 90662 IIV NO PRSV INCREASED AG IM: CPT | Performed by: FAMILY MEDICINE

## 2025-03-24 PROCEDURE — 1170F FXNL STATUS ASSESSED: CPT | Performed by: FAMILY MEDICINE

## 2025-03-24 PROCEDURE — G0008 ADMIN INFLUENZA VIRUS VAC: HCPCS | Performed by: FAMILY MEDICINE

## 2025-03-24 PROCEDURE — 3075F SYST BP GE 130 - 139MM HG: CPT | Performed by: FAMILY MEDICINE

## 2025-03-24 PROCEDURE — 1036F TOBACCO NON-USER: CPT | Performed by: FAMILY MEDICINE

## 2025-03-24 PROCEDURE — 1123F ACP DISCUSS/DSCN MKR DOCD: CPT | Performed by: FAMILY MEDICINE

## 2025-03-24 PROCEDURE — 83036 HEMOGLOBIN GLYCOSYLATED A1C: CPT | Performed by: FAMILY MEDICINE

## 2025-03-24 PROCEDURE — 1160F RVW MEDS BY RX/DR IN RCRD: CPT | Performed by: FAMILY MEDICINE

## 2025-03-24 PROCEDURE — 1159F MED LIST DOCD IN RCRD: CPT | Performed by: FAMILY MEDICINE

## 2025-03-24 PROCEDURE — 3078F DIAST BP <80 MM HG: CPT | Performed by: FAMILY MEDICINE

## 2025-03-24 PROCEDURE — G0439 PPPS, SUBSEQ VISIT: HCPCS | Performed by: FAMILY MEDICINE

## 2025-03-24 PROCEDURE — 1158F ADVNC CARE PLAN TLK DOCD: CPT | Performed by: FAMILY MEDICINE

## 2025-03-24 RX ORDER — CARVEDILOL 6.25 MG/1
6.25 TABLET ORAL 2 TIMES DAILY
Qty: 180 TABLET | Refills: 3 | Status: SHIPPED | OUTPATIENT
Start: 2025-03-24

## 2025-03-24 RX ORDER — AMLODIPINE BESYLATE 5 MG/1
5 TABLET ORAL DAILY
Qty: 90 TABLET | Refills: 3 | Status: SHIPPED | OUTPATIENT
Start: 2025-03-24

## 2025-03-24 RX ORDER — SERTRALINE HYDROCHLORIDE 50 MG/1
50 TABLET, FILM COATED ORAL DAILY
Qty: 90 TABLET | Refills: 3 | Status: SHIPPED | OUTPATIENT
Start: 2025-03-24 | End: 2026-03-24

## 2025-03-24 RX ORDER — ATORVASTATIN CALCIUM 20 MG/1
20 TABLET, FILM COATED ORAL DAILY
Qty: 90 TABLET | Refills: 3 | Status: SHIPPED | OUTPATIENT
Start: 2025-03-24

## 2025-03-24 ASSESSMENT — ACTIVITIES OF DAILY LIVING (ADL)
DOING_HOUSEWORK: INDEPENDENT
GROCERY_SHOPPING: INDEPENDENT
BATHING: INDEPENDENT
MANAGING_FINANCES: INDEPENDENT
DRESSING: INDEPENDENT
TAKING_MEDICATION: INDEPENDENT

## 2025-03-24 ASSESSMENT — ENCOUNTER SYMPTOMS
LOSS OF SENSATION IN FEET: 0
OCCASIONAL FEELINGS OF UNSTEADINESS: 1
DEPRESSION: 0

## 2025-03-24 ASSESSMENT — PATIENT HEALTH QUESTIONNAIRE - PHQ9
SUM OF ALL RESPONSES TO PHQ9 QUESTIONS 1 AND 2: 0
1. LITTLE INTEREST OR PLEASURE IN DOING THINGS: NOT AT ALL
2. FEELING DOWN, DEPRESSED OR HOPELESS: NOT AT ALL

## 2025-03-24 NOTE — ASSESSMENT & PLAN NOTE
Patient is also here for follow-up of hypertension.. Symptoms do not include  confusion visual disturbance..shortness of breath chest pain syncope or palpitations. Recent blood pressure patient has   been checking.  BLOOD PRESSURE HIGH  160  Ry report there is good compliance with treatment. Pertinent medical history includes   stable and continue meds

## 2025-03-24 NOTE — PROGRESS NOTES
Subjective   Mayela Otoole is a 89 y.o. female who is here for a Medicare subsequent r exam.  Active Problem List      Comprehensive Medical/Surgical/Social/Family History  Past Medical History:   Diagnosis Date    Nausea 02/24/2021    Nausea in adult    Personal history of other diseases of the digestive system     History of acute pancreatitis    Personal history of other specified conditions     History of vomiting    Personal history of other specified conditions     History of diarrhea     Past Surgical History:   Procedure Laterality Date    CT ANGIO NECK  7/10/2022    CT NECK ANGIO W AND WO IV CONTRAST 7/10/2022    MR HEAD ANGIO WO IV CONTRAST  7/11/2022    MR HEAD ANGIO WO IV CONTRAST 7/11/2022    OTHER SURGICAL HISTORY  08/05/2019    Heart surgery    OTHER SURGICAL HISTORY  08/05/2019    Tonsillectomy    OTHER SURGICAL HISTORY  08/05/2019    Appendectomy     Social History     Social History Narrative    Not on file     Social History     Socioeconomic History    Marital status:      Spouse name: Not on file    Number of children: Not on file    Years of education: Not on file    Highest education level: Not on file   Occupational History    Not on file   Tobacco Use    Smoking status: Never    Smokeless tobacco: Never   Vaping Use    Vaping status: Never Used   Substance and Sexual Activity    Alcohol use: Never    Drug use: Never    Sexual activity: Defer   Other Topics Concern    Not on file   Social History Narrative    Not on file     Social Drivers of Health     Financial Resource Strain: Not on file   Food Insecurity: Not on file   Transportation Needs: Not on file   Physical Activity: Not on file   Stress: Not on file   Social Connections: Not on file   Intimate Partner Violence: Not on file   Housing Stability: Not on file      Family History   Problem Relation Name Age of Onset    Coronary artery disease Mother      No Known Problems Father          Allergies and  Medications  Carbapenems, Cephalosporins, Gluten, Hydralazine, and Penicillins  Current Outpatient Medications on File Prior to Visit   Medication Sig Dispense Refill    hydrocortisone (Anusol-HC) 2.5 % rectal cream Insert into the rectum 4 times a day as needed for hemorrhoids (rectal discomfort). Apply to affected areas 30 g 0    multivit-min/ferrous fumarate (MULTI VITAMIN ORAL) Take 1 capsule by mouth in the morning.      [DISCONTINUED] amLODIPine (Norvasc) 5 mg tablet Take 1 tablet (5 mg) by mouth once daily. 90 tablet 3    [DISCONTINUED] atorvastatin (Lipitor) 20 mg tablet Take 1 tablet (20 mg) by mouth once daily. 90 tablet 0    [DISCONTINUED] carvedilol (Coreg) 6.25 mg tablet TAKE 1 TABLET TWICE DAILY 180 tablet 3    [DISCONTINUED] sertraline (Zoloft) 50 mg tablet Take 1 tablet (50 mg) by mouth once daily. 90 tablet 1     No current facility-administered medications on file prior to visit.       New Concerns:  Needs     refillls   here  for f/u  of depression .REMAINS COMPLIANT  ON  MEDS ... denies all of the following:  little  interest in doing things,  feeling  down or depressed  or  hopeless, trouble fallling asleep or staying asleep,  or sleeping too  much,  feeling tired  or having  little  energy,  poor appetite,  overeating,  or  considerable weigt changes,  feeling bad about yourself -  that you are a failure  or  having a lot  of  guilt, difficulty  concentrating  on things or  making decisions, moving  or speaking slowly ,  so that  other people  noticed,  thoughts that you would  be  better  off  dead or  hurting yourself  in some way ..  Patient is also here for follow-up of hypertension.. Symptoms do not include  confusion visual disturbance..shortness of breath chest pain syncope or palpitations. Recent blood pressure patient has   been checking.  BLOOD PRESSURE HIGH  160  Ry report there is good compliance with treatment. Pertinent medical history includes  ..  /hyperlipidemia ... DIZZINESS  ".. AND  HEADACHE   FROM    FALL   8 MNTHS   .. NOT  WORSENING  .. HITHEAD ON  BEDROOM FLOOR  AT  HOME    Patient is also here for follow-up of hyperlipidemia. The most recent measurements for this patient would take it on.9/22. ... Associated symptoms do not include chest pain, Cramps with exertion, myalgias or nausea. Current treatment includes statins. By report there is good compliance with treatment. Pertinent medical history includes d hypertension     Lifestyle  Diet:   down to 1  Physical Activity: Not on file      Tobacco Use: Low Risk  (3/24/2025)    Patient History     Smoking Tobacco Use: Never     Smokeless Tobacco Use: Never     Passive Exposure: Not on file      Alcohol Use: Not At Risk (3/11/2025)    [facility]    AUDIT-C     Frequency of Alcohol Consumption: Never     Average Number of Drinks: Patient does not drink     Frequency of Binge Drinking: Never      Depression: Not at risk (3/24/2025)    PHQ-2     PHQ-2 Score: 0      Stress: Not on file       Consultants:  Patient Care Team:  Marvin Sandy DO as PCP - General  Marvin Sandy DO as PCP - [employer]age PCP  Marvin Sandy DO as PCP - [employer] Medicare Advantage PCP   ..    Colorectal Screening:   colonoscopy  Date: Not indicated     Breast Screening:   Patient defers  History of abnormal mammogram: no  Family history of breast cancer: no    Abnormal uterine bleeding: -  Urinary incontinence: +    Dexa Scan: due today    Review of Systems  All other  pertinent  systems reviewed and are negative except  those  mentioned  in HPI   Objective   /76   Pulse 93   Temp 36.1 °C (97 °F)   Resp 18   Ht 1.562 m (5' 1.5\")   Wt 61.1 kg (134 lb 9.6 oz)   SpO2 96%   BMI 25.02 kg/m²     Physical Exam  general: alert oriented x three  HEENT hearing normal to voice  Neck supple  Lungs respirations non-labored.  Cardiovascular: no peripheral edema  Skin: warm and dry without rash  Psych: " judgement and insight normal  Musculoskeletal:  ambulation normal,    lymph:negative cervical  LYMPADENOPATHY  thyroid: non palpable enlargement   Assessment/Plan   Problem List Items Addressed This Visit       Depression, recurrent (CMS-HCC)     here  for f/u  of depression .REMAINS COMPLIANT  ON  MEDS ... denies all of the following:  little  interest in doing things,  feeling  down or depressed  or  hopeless, trouble fallling asleep or staying asleep,  or sleeping too  much,  feeling tired  or having  little  energy,  poor appetite,  overeating,  or  considerable weigt changes,  feeling bad about yourself -  that you are a failure  or  having a lot  of  guilt, difficulty  concentrating  on things or  making decisions, moving  or speaking slowly ,  so that  other people  noticed,  thoughts that you would  be  better  off  dead or  hurting yourself  in some way ..  stable and continue meds          Relevant Medications    sertraline (Zoloft) 50 mg tablet    Hyperlipidemia    Relevant Medications    atorvastatin (Lipitor) 20 mg tablet    Hypertension    Relevant Medications    carvedilol (Coreg) 6.25 mg tablet    amLODIPine (Norvasc) 5 mg tablet    Benign essential hypertension     Patient is also here for follow-up of hypertension.. Symptoms do not include  confusion visual disturbance..shortness of breath chest pain syncope or palpitations. Recent blood pressure patient has   been checking.  BLOOD PRESSURE HIGH  160  Ry report there is good compliance with treatment. Pertinent medical history includes   stable and continue meds           Other Visit Diagnoses       Routine general medical examination at health care facility    -  Primary    Relevant Orders    1 Year Follow Up In [facility] - PCP - Wellness Exam    1 Year Follow Up In [facility] - PCP - Wellness Exam    Comprehensive Metabolic Panel    Need for vaccination        Relevant Orders    Flu vaccine, high dose    Asymptomatic  menopausal state        Relevant Orders    XR DEXA bone density    Depression, unspecified depression type        Relevant Medications    sertraline (Zoloft) 50 mg tablet    Abnormal finding of blood chemistry, unspecified        Relevant Orders    POCT Hemoglobin A1C    Diabetes mellitus screening        Relevant Orders    POCT Hemoglobin A1C    Chronic post-traumatic headache, not intractable        Relevant Orders    CT head wo IV contrast            Reviewed Social Determinants of health with patient, discussed healthy lifestyle including 150 minutes of physical activity per week  Ordered/Reviewed baseline labwork -CBC, CMP, Lipid Panel  Immunizations: Given Influenza today  Mammogram Not indicated   Dexa ORDERED    Colorectal Screen Not indicated   Results of labs indicate not diabetic  Health Counseling

## 2025-03-24 NOTE — ASSESSMENT & PLAN NOTE
here  for f/u  of depression .REMAINS COMPLIANT  ON  MEDS ... denies all of the following:  little  interest in doing things,  feeling  down or depressed  or  hopeless, trouble fallling asleep or staying asleep,  or sleeping too  much,  feeling tired  or having  little  energy,  poor appetite,  overeating,  or  considerable weigt changes,  feeling bad about yourself -  that you are a failure  or  having a lot  of  guilt, difficulty  concentrating  on things or  making decisions, moving  or speaking slowly ,  so that  other people  noticed,  thoughts that you would  be  better  off  dead or  hurting yourself  in some way ..  stable and continue meds

## 2025-03-24 NOTE — PATIENT INSTRUCTIONS

## 2025-04-07 ENCOUNTER — HOSPITAL ENCOUNTER (OUTPATIENT)
Dept: RADIOLOGY | Facility: HOSPITAL | Age: 89
Discharge: HOME | End: 2025-04-07
Payer: COMMERCIAL

## 2025-04-07 DIAGNOSIS — G44.329 CHRONIC POST-TRAUMATIC HEADACHE, NOT INTRACTABLE: ICD-10-CM

## 2025-04-07 PROCEDURE — 70450 CT HEAD/BRAIN W/O DYE: CPT

## 2025-04-07 PROCEDURE — 70450 CT HEAD/BRAIN W/O DYE: CPT | Performed by: RADIOLOGY

## 2025-04-09 ENCOUNTER — APPOINTMENT (OUTPATIENT)
Dept: PRIMARY CARE | Facility: CLINIC | Age: 89
End: 2025-04-09
Payer: COMMERCIAL

## 2025-07-07 DIAGNOSIS — E78.5 HYPERLIPIDEMIA, UNSPECIFIED HYPERLIPIDEMIA TYPE: ICD-10-CM

## 2025-07-07 RX ORDER — ATORVASTATIN CALCIUM 20 MG/1
20 TABLET, FILM COATED ORAL DAILY
Qty: 90 TABLET | Refills: 3 | Status: SHIPPED | OUTPATIENT
Start: 2025-07-07

## 2025-07-08 ENCOUNTER — TELEPHONE (OUTPATIENT)
Dept: PRIMARY CARE | Facility: CLINIC | Age: 89
End: 2025-07-08
Payer: COMMERCIAL

## 2025-07-08 DIAGNOSIS — I10 HYPERTENSION, UNSPECIFIED TYPE: ICD-10-CM

## 2025-07-08 DIAGNOSIS — E78.5 HYPERLIPIDEMIA, UNSPECIFIED HYPERLIPIDEMIA TYPE: ICD-10-CM

## 2025-07-08 DIAGNOSIS — F32.A DEPRESSION, UNSPECIFIED DEPRESSION TYPE: ICD-10-CM

## 2025-07-08 DIAGNOSIS — I15.9 SECONDARY HYPERTENSION: ICD-10-CM

## 2025-07-16 NOTE — TELEPHONE ENCOUNTER
Rx Refill Request Telephone Encounter: Patient is out of medications. Please assist, Thanks    Name:  Mayela Otoole  :  [id number]  Medication Name:  carvedilol (Coreg) 6.25 mg tablet     atorvastatin (Lipitor) 20 mg tablet     Specific Pharmacy location:    [facility] - [facility] [address] Phone: [phone]   Fax: [phone]          Best number to reach patient:    [phone]          DISCHARGE

## 2025-07-18 RX ORDER — ATORVASTATIN CALCIUM 20 MG/1
20 TABLET, FILM COATED ORAL DAILY
Qty: 90 TABLET | Refills: 3 | Status: CANCELLED | OUTPATIENT
Start: 2025-07-18